# Patient Record
Sex: FEMALE | Race: WHITE | NOT HISPANIC OR LATINO | Employment: OTHER | URBAN - METROPOLITAN AREA
[De-identification: names, ages, dates, MRNs, and addresses within clinical notes are randomized per-mention and may not be internally consistent; named-entity substitution may affect disease eponyms.]

---

## 2017-02-10 ENCOUNTER — HOSPITAL ENCOUNTER (OUTPATIENT)
Dept: LAB | Facility: MEDICAL CENTER | Age: 62
End: 2017-02-10
Attending: FAMILY MEDICINE
Payer: MEDICARE

## 2017-02-10 LAB
ALBUMIN SERPL BCP-MCNC: 4 G/DL (ref 3.2–4.9)
ALBUMIN/GLOB SERPL: 1.1 G/DL
ALP SERPL-CCNC: 74 U/L (ref 30–99)
ALT SERPL-CCNC: 25 U/L (ref 2–50)
ANION GAP SERPL CALC-SCNC: 7 MMOL/L (ref 0–11.9)
AST SERPL-CCNC: 22 U/L (ref 12–45)
BASOPHILS # BLD AUTO: 0.04 K/UL (ref 0–0.12)
BASOPHILS NFR BLD AUTO: 0.5 % (ref 0–1.8)
BILIRUB SERPL-MCNC: 0.4 MG/DL (ref 0.1–1.5)
BUN SERPL-MCNC: 10 MG/DL (ref 8–22)
CALCIUM SERPL-MCNC: 9.3 MG/DL (ref 8.5–10.5)
CHLORIDE SERPL-SCNC: 106 MMOL/L (ref 96–112)
CHOLEST SERPL-MCNC: 178 MG/DL (ref 100–199)
CO2 SERPL-SCNC: 27 MMOL/L (ref 20–33)
CREAT SERPL-MCNC: 0.83 MG/DL (ref 0.5–1.4)
EOSINOPHIL # BLD: 0.31 K/UL (ref 0–0.51)
EOSINOPHIL NFR BLD AUTO: 3.6 % (ref 0–6.9)
ERYTHROCYTE [DISTWIDTH] IN BLOOD BY AUTOMATED COUNT: 49.6 FL (ref 35.9–50)
EST. AVERAGE GLUCOSE BLD GHB EST-MCNC: 131 MG/DL
GLOBULIN SER CALC-MCNC: 3.6 G/DL (ref 1.9–3.5)
GLUCOSE SERPL-MCNC: 123 MG/DL (ref 65–99)
HBA1C MFR BLD: 6.2 % (ref 0–5.6)
HCT VFR BLD AUTO: 47.7 % (ref 37–47)
HDLC SERPL-MCNC: 36 MG/DL
HGB BLD-MCNC: 15.3 G/DL (ref 12–16)
IMM GRANULOCYTES # BLD AUTO: 0.03 K/UL (ref 0–0.11)
IMM GRANULOCYTES NFR BLD AUTO: 0.3 % (ref 0–0.9)
LDLC SERPL CALC-MCNC: 109 MG/DL
LYMPHOCYTES # BLD: 3.12 K/UL (ref 1–4.8)
LYMPHOCYTES NFR BLD AUTO: 36.4 % (ref 22–41)
MCH RBC QN AUTO: 31 PG (ref 27–33)
MCHC RBC AUTO-ENTMCNC: 32.1 G/DL (ref 33.6–35)
MCV RBC AUTO: 96.8 FL (ref 81.4–97.8)
MONOCYTES # BLD: 0.38 K/UL (ref 0–0.85)
MONOCYTES NFR BLD AUTO: 4.4 % (ref 0–13.4)
NEUTROPHILS # BLD: 4.7 K/UL (ref 2–7.15)
NEUTROPHILS NFR BLD AUTO: 54.8 % (ref 44–72)
NRBC # BLD AUTO: 0 K/UL
NRBC BLD-RTO: 0 /100 WBC
PLATELET # BLD AUTO: 360 K/UL (ref 164–446)
PMV BLD AUTO: 11.3 FL (ref 9–12.9)
POTASSIUM SERPL-SCNC: 4.6 MMOL/L (ref 3.6–5.5)
PROT SERPL-MCNC: 7.6 G/DL (ref 6–8.2)
RBC # BLD AUTO: 4.93 M/UL (ref 4.2–5.4)
SODIUM SERPL-SCNC: 140 MMOL/L (ref 135–145)
T3 SERPL-MCNC: 116.2 NG/DL (ref 60–181)
T4 FREE SERPL-MCNC: 1.18 NG/DL (ref 0.53–1.43)
TRIGL SERPL-MCNC: 166 MG/DL (ref 0–149)
TSH SERPL DL<=0.005 MIU/L-ACNC: 1.48 UIU/ML (ref 0.3–3.7)
WBC # BLD AUTO: 8.6 K/UL (ref 4.8–10.8)

## 2017-02-10 PROCEDURE — 84443 ASSAY THYROID STIM HORMONE: CPT

## 2017-02-10 PROCEDURE — 85025 COMPLETE CBC W/AUTO DIFF WBC: CPT

## 2017-02-10 PROCEDURE — 83036 HEMOGLOBIN GLYCOSYLATED A1C: CPT | Mod: GA

## 2017-02-10 PROCEDURE — 80061 LIPID PANEL: CPT

## 2017-02-10 PROCEDURE — 36415 COLL VENOUS BLD VENIPUNCTURE: CPT | Mod: GA

## 2017-02-10 PROCEDURE — 80053 COMPREHEN METABOLIC PANEL: CPT

## 2017-02-10 PROCEDURE — 84439 ASSAY OF FREE THYROXINE: CPT

## 2017-02-10 PROCEDURE — 84480 ASSAY TRIIODOTHYRONINE (T3): CPT

## 2017-11-27 ENCOUNTER — OFFICE VISIT (OUTPATIENT)
Dept: BEHAVIORAL HEALTH | Facility: PHYSICIAN GROUP | Age: 62
End: 2017-11-27
Payer: MEDICARE

## 2017-11-27 DIAGNOSIS — F90.0 ATTENTION DEFICIT HYPERACTIVITY DISORDER (ADHD), PREDOMINANTLY INATTENTIVE TYPE: ICD-10-CM

## 2017-11-27 DIAGNOSIS — F33.42 MDD (MAJOR DEPRESSIVE DISORDER), RECURRENT, IN FULL REMISSION (HCC): ICD-10-CM

## 2017-11-27 DIAGNOSIS — Z87.39 H/O FIBROMYALGIA: ICD-10-CM

## 2017-11-27 PROBLEM — F90.9 ADHD: Status: ACTIVE | Noted: 2017-11-27

## 2017-11-27 PROCEDURE — 99204 OFFICE O/P NEW MOD 45 MIN: CPT | Performed by: STUDENT IN AN ORGANIZED HEALTH CARE EDUCATION/TRAINING PROGRAM

## 2017-11-27 RX ORDER — TOPIRAMATE 25 MG/1
TABLET ORAL
Qty: 60 TAB | Refills: 4 | Status: SHIPPED | OUTPATIENT
Start: 2017-11-27 | End: 2018-04-27 | Stop reason: SDUPTHER

## 2017-11-27 RX ORDER — DEXTROAMPHETAMINE SACCHARATE, AMPHETAMINE ASPARTATE, DEXTROAMPHETAMINE SULFATE AND AMPHETAMINE SULFATE 5; 5; 5; 5 MG/1; MG/1; MG/1; MG/1
20 TABLET ORAL 2 TIMES DAILY
Qty: 60 TAB | Refills: 0 | Status: SHIPPED | OUTPATIENT
Start: 2017-12-14 | End: 2017-11-27 | Stop reason: SDUPTHER

## 2017-11-27 RX ORDER — TOPIRAMATE 25 MG/1
TABLET ORAL
Qty: 60 TAB | Refills: 4 | Status: SHIPPED | OUTPATIENT
Start: 2017-11-27 | End: 2017-11-27 | Stop reason: SDUPTHER

## 2017-11-27 RX ORDER — ARIPIPRAZOLE 10 MG/1
10 TABLET ORAL DAILY
Qty: 30 TAB | Refills: 1 | Status: SHIPPED | OUTPATIENT
Start: 2017-11-27 | End: 2018-04-27 | Stop reason: SDUPTHER

## 2017-11-27 RX ORDER — DEXTROAMPHETAMINE SACCHARATE, AMPHETAMINE ASPARTATE, DEXTROAMPHETAMINE SULFATE AND AMPHETAMINE SULFATE 5; 5; 5; 5 MG/1; MG/1; MG/1; MG/1
20 TABLET ORAL 2 TIMES DAILY
Qty: 60 TAB | Refills: 0 | Status: SHIPPED | OUTPATIENT
Start: 2018-02-08 | End: 2018-01-08 | Stop reason: SDUPTHER

## 2017-11-27 RX ORDER — VENLAFAXINE HYDROCHLORIDE 150 MG/1
150 CAPSULE, EXTENDED RELEASE ORAL DAILY
Qty: 30 CAP | Refills: 3 | Status: SHIPPED | OUTPATIENT
Start: 2017-11-27 | End: 2018-04-27 | Stop reason: SDUPTHER

## 2017-11-27 RX ORDER — DEXTROAMPHETAMINE SACCHARATE, AMPHETAMINE ASPARTATE, DEXTROAMPHETAMINE SULFATE AND AMPHETAMINE SULFATE 5; 5; 5; 5 MG/1; MG/1; MG/1; MG/1
20 TABLET ORAL 2 TIMES DAILY
Qty: 60 TAB | Refills: 0 | Status: SHIPPED | OUTPATIENT
Start: 2018-01-11 | End: 2017-11-27 | Stop reason: SDUPTHER

## 2017-11-27 NOTE — PROGRESS NOTES
PSYCHIATRIC Evaluation::   Requesting Physician: Dr. Burdick (PCP)  Supervising Physician:     Dr. Alena John     ID:  63 y/o white female on disability secondary to depression/fibromyalgia, seen for new establishment visit this morning.     Chief Complaint: Doing well, needs new establishment with psychiatry and med refills today    CURRENT PSYCHOTROPIC MEDS:   abilify 20mg PO QDaily for depression/OCD (adjunct) - since 2010  Adderall 30mg PO BID for ADHD - since 2000  Effexor ER 150mg PO QDaily for depression - since 2000    HPI:   Says her new PCP suggested that patient become established with psychiatry because she did not feel comfortable prescribing her current psychotropic medications. Patient states she feels very stable on her current medication regiment (listed above) since 2010 for depression/fibromyalgia after abilify was added. Says this medication has significantly helped with her chronic suicidal ideation. Says her current mood is great, only complaint today is significant weight gain of 60 pounds over the last 7 years which she is attributing to Abilify. Discussed tapering Abilify to 10mg Daily as maximum efficacy of this medication has not shown significant difference between 10mg and 20 mg and additionally trialing Topiramate in the treatment and prevention of antipsychotic weight gain (off-label use). Otherwise states sleep is not effected.     Discussed taking adderall for ADHD, says she is taking this medication primarily for organization and focus- says this medication changed her life because her home use to be in significant clutter until this medication was started. Says she is able to take care of her mental health much better since starting this medication.        Psychiatric Review of Systems:current symptoms as reported by pt.  Depression:   None currently. Long hx of depression currently managed with above medication regiment.   Meseret: denies hx of meseret  Anxiety/Panic Attacks:  denies hx of anxiety/panic attacks  PTSD symptom: denies symptoms of ptsd  Psychosis: denies symptoms of psychosis          Medical Review of Systems: as reported by pt. All systems reviewed. Only those found to be + are noted below. All others are negative.   Neurological:    TBIs: denies   SZs: denies   Strokes: denies    Other medical symptoms:   Thyroid: hx of hypothyroidism managed with levothyroxine- does not remember current dosage   Diabetes: pre-diabetic HA1C 6.2.    Cardiovascular disease: hx of hyperlipidemia, currenly taking lipitor and fenofibrate   Hx of Asthma- currently smoking 1PPD    Psychiatric Examination: observed phenomenon:    Musculoskeletal(abnormal movements, gait, etc): no abnormal movements observed  Appearance: obese female, approprietly groomed. Answers questions appropriately.  Thoughts: linear, organized  Speech:RRR  Mood:          good  Affect:         Mood congruent, euthymic  SI/HI:   Denies/denies  Attention/Alertness:   alert  Memory:    Grossly intact  Orientation:    To person, place, time, and situation intact  Fund of Knowledge:    Grossly intact  Insight/Judgement into symptoms: good/good        Past Psychiatric Hx:   Denies previous hx of self-injurious behaviors  Denies previous hx of inpatient psychiatric hospitalizations   Denies hx of Suicidal attempt    PAST PSYCHOTROPIC MEDS:  -prozac/zoloft/paxil - unknown dosages.     Family Psychiatric Hx:  Mother with hx of alcoholism  sister 67y/o with history of parkinsons  Younger sister 59 y/o with hx of depression/anxiety     Social Hx:  Lives by herself,  X 1. No children  Currently on Disability since 2005 for depression/fibromyalgia  Says she is very close with her 4 siblings (3 sisters and 1 brother) who live in [place], [place].    Drug/Alcohol/Tobacco Hx:   Drugs: hx of daily marijuana use - 1X day for fibromyalgia   Alcohol: denies drinking at this time.   Tobacco: currently smoking 1ppd    Medical Hx: labs, MARS,  medications, etc were reviewed. Only those findings of potential interest to psychiatry are noted below:  Medical Conditions:   Hypothyroidism; pre-diabetes; hyperlipidemia   Allergies: nkda  Medications (currently prescribed at [facility]):   No current outpatient prescriptions on file prior to visit.     No current facility-administered medications on file prior to visit.      CURRENT MEDS: singular; lipitor ; albuterol; fenofibrate  Labs: 2/10/17: CBC/CMP wnl except glucose 123 (H). HA1C 6.2 (H). Triglycerides 166 (H);  (H); TSH WNL; Vit B12 WNL; Vit D WNL.   ECG: none on file     Cranial Imaging: n/a       ASSESSMENT/PLAN:  61 y/o white female with long hx of depression/fibromyalgia and ADHD. Appears to be well managed with her current medication regiment for her mood. Discussed tapering Abilify to 10mg Daily due to metabolic side effect of significant weight gain with addition of topiramate - off label use for weight gain associated with neuroleptic medications. Discussed trial of lower dose adderall as 40mg/daily is what is recommended as upper limit of maximum, patient is willing to trial today. Discussed healthy eating habits today.    #MDD, in sustained remission. With Fibromyalgia   #ADHD  -Hx of OCD symptoms    -Cont. Venlafaxine ER 150mg PO QDaily   -Taper adderall to 20mg PO BID  -Taper Abilify to 10mg PO QDaily  -Trial topiramate 25mg PO QDaily X 7 days, then increase to 50mg PO QDaily for weight gain associated with neuroleptic medication.  -f/u 10 weeks.

## 2017-12-01 ENCOUNTER — TELEPHONE (OUTPATIENT)
Dept: BEHAVIORAL HEALTH | Facility: PHYSICIAN GROUP | Age: 62
End: 2017-12-01

## 2017-12-01 NOTE — TELEPHONE ENCOUNTER
----- Message from Lilli Mata, Med Ass't sent at 11/27/2017 10:29 AM PST -----  Regarding: clarification  Pharmacy called needs clarification on the Topamax, states how you have it written doesn't make sense.     Please call [phone]      Reordered med with correct information. Left message on patients cell phone discussing dosages.

## 2017-12-18 ENCOUNTER — TELEPHONE (OUTPATIENT)
Dept: BEHAVIORAL HEALTH | Facility: PHYSICIAN GROUP | Age: 62
End: 2017-12-18

## 2017-12-18 NOTE — TELEPHONE ENCOUNTER
Returned patients phone call. Binh wrote for IR instead of XR for adderall, however, patient already picked up this months prescription. Requested to contact me 1.5 weeks before next fill and I will write for XR and have patient  prescription at our office with photo ID. Patient agreed to this plan.

## 2018-01-01 ENCOUNTER — OUTPATIENT (OUTPATIENT)
Dept: OUTPATIENT SERVICES | Facility: HOSPITAL | Age: 63
LOS: 1 days | End: 2018-01-01

## 2018-01-08 DIAGNOSIS — F90.0 ATTENTION DEFICIT HYPERACTIVITY DISORDER (ADHD), PREDOMINANTLY INATTENTIVE TYPE: Primary | ICD-10-CM

## 2018-01-08 RX ORDER — DEXTROAMPHETAMINE SACCHARATE, AMPHETAMINE ASPARTATE, DEXTROAMPHETAMINE SULFATE AND AMPHETAMINE SULFATE 5; 5; 5; 5 MG/1; MG/1; MG/1; MG/1
20 TABLET ORAL 2 TIMES DAILY
Qty: 60 TAB | Refills: 0 | Status: SHIPPED | OUTPATIENT
Start: 2018-01-08 | End: 2018-01-19

## 2018-01-08 RX ORDER — DEXTROAMPHETAMINE SACCHARATE, AMPHETAMINE ASPARTATE, DEXTROAMPHETAMINE SULFATE AND AMPHETAMINE SULFATE 5; 5; 5; 5 MG/1; MG/1; MG/1; MG/1
20 TABLET ORAL 2 TIMES DAILY
Qty: 60 TAB | Refills: 0 | Status: SHIPPED | OUTPATIENT
Start: 2018-02-02 | End: 2018-01-19

## 2018-01-08 RX ORDER — DEXTROAMPHETAMINE SACCHARATE, AMPHETAMINE ASPARTATE, DEXTROAMPHETAMINE SULFATE AND AMPHETAMINE SULFATE 5; 5; 5; 5 MG/1; MG/1; MG/1; MG/1
20 TABLET ORAL 2 TIMES DAILY
Qty: 60 TAB | Refills: 0 | Status: SHIPPED | OUTPATIENT
Start: 2018-02-08 | End: 2018-01-08 | Stop reason: SDUPTHER

## 2018-01-19 ENCOUNTER — EMERGENCY (EMERGENCY)
Facility: HOSPITAL | Age: 63
LOS: 0 days | Discharge: HOME | End: 2018-01-19
Attending: EMERGENCY MEDICINE | Admitting: EMERGENCY MEDICINE
Payer: MEDICAID

## 2018-01-19 VITALS
SYSTOLIC BLOOD PRESSURE: 142 MMHG | RESPIRATION RATE: 16 BRPM | DIASTOLIC BLOOD PRESSURE: 91 MMHG | TEMPERATURE: 98 F | OXYGEN SATURATION: 100 % | HEART RATE: 86 BPM

## 2018-01-19 VITALS — WEIGHT: 199.96 LBS

## 2018-01-19 DIAGNOSIS — M25.561 PAIN IN RIGHT KNEE: ICD-10-CM

## 2018-01-19 DIAGNOSIS — F90.0 ATTENTION DEFICIT HYPERACTIVITY DISORDER (ADHD), PREDOMINANTLY INATTENTIVE TYPE: Primary | ICD-10-CM

## 2018-01-19 PROCEDURE — 99284 EMERGENCY DEPT VISIT MOD MDM: CPT

## 2018-01-19 RX ORDER — DEXTROAMPHETAMINE SACCHARATE, AMPHETAMINE ASPARTATE MONOHYDRATE, DEXTROAMPHETAMINE SULFATE AND AMPHETAMINE SULFATE 7.5; 7.5; 7.5; 7.5 MG/1; MG/1; MG/1; MG/1
30 CAPSULE, EXTENDED RELEASE ORAL EVERY MORNING
Qty: 30 CAP | Refills: 0 | Status: SHIPPED | OUTPATIENT
Start: 2018-01-19 | End: 2018-02-09

## 2018-01-19 RX ORDER — DEXTROAMPHETAMINE SACCHARATE, AMPHETAMINE ASPARTATE MONOHYDRATE, DEXTROAMPHETAMINE SULFATE AND AMPHETAMINE SULFATE 7.5; 7.5; 7.5; 7.5 MG/1; MG/1; MG/1; MG/1
30 CAPSULE, EXTENDED RELEASE ORAL EVERY MORNING
Qty: 30 CAP | Refills: 0 | Status: SHIPPED | OUTPATIENT
Start: 2018-02-15 | End: 2018-02-09

## 2018-01-19 NOTE — ED STATDOCS - PROGRESS NOTE DETAILS
ZACH Melgoza:  Pt seen and examined, she presented with c/o right knee pain and swelling s/p injury 3 wks ago and reinjuring it again 2 days ago. Denies any LE weakness, long car rides, able to ambulate. Denies any redness. PE Right knee with ecchymosis to the shin consistent with the injury 3 wks ago, +edema. nontender to palpation, FROM, NVI. Plan: Xray negative for any fracture. Will dc home for outpt f/u.

## 2018-01-19 NOTE — ED STATDOCS - OBJECTIVE STATEMENT
62f pmh breast cancer, takes tamoxifen analog, presents sent in by PMD for right knee pain r/o DVT.  Pt struck her right knee against her tub 3 weeks ago, developed a hematoma which slowly began traveling down her leg to the ankle.  2 days ago she reinjured the leg while walking her dog, now concerned for DVT.

## 2018-01-19 NOTE — ED STATDOCS - MUSCULOSKELETAL, MLM
Ecchymosis to anterior right lower leg with tracking edema to lateral right foot consistent with dependent edema. No erythema. Ambulatory. NVI.

## 2018-01-19 NOTE — TELEPHONE ENCOUNTER
Returned phone call, patient requesting extended release adderall, agreed to write for 30mg ER (generic). Patient will  in office, need to bring ID. F/U appontment scheduled for Feb 2018.

## 2018-01-19 NOTE — ED STATDOCS - MEDICAL DECISION MAKING DETAILS
History and physical consistent with traumatic leg injury and dependent ecchymosis. No suspicion for DVT, will xray r/o fracture and DC home with pain control.

## 2018-01-19 NOTE — ED ADULT TRIAGE NOTE - CHIEF COMPLAINT QUOTE
Pt states that 3 weejs ago she fell onto her right knee and had bruising from the knee down, with swelling, pt states that she fell again tuesday and has inc. bruising swelling and pain and was advised to go to ed by MD pt denies being on anticoagulation therapy. normal sensation and able to ambulate

## 2018-01-20 PROCEDURE — 73562 X-RAY EXAM OF KNEE 3: CPT | Mod: 26,RT

## 2018-01-23 DIAGNOSIS — R69 ILLNESS, UNSPECIFIED: ICD-10-CM

## 2018-02-06 DIAGNOSIS — F90.0 ATTENTION DEFICIT HYPERACTIVITY DISORDER (ADHD), PREDOMINANTLY INATTENTIVE TYPE: ICD-10-CM

## 2018-02-09 DIAGNOSIS — F90.0 ATTENTION DEFICIT HYPERACTIVITY DISORDER (ADHD), PREDOMINANTLY INATTENTIVE TYPE: ICD-10-CM

## 2018-02-09 RX ORDER — DEXTROAMPHETAMINE SACCHARATE, AMPHETAMINE ASPARTATE, DEXTROAMPHETAMINE SULFATE AND AMPHETAMINE SULFATE 5; 5; 5; 5 MG/1; MG/1; MG/1; MG/1
20 TABLET ORAL 2 TIMES DAILY
Qty: 60 TAB | Refills: 0 | Status: SHIPPED | OUTPATIENT
Start: 2018-02-09 | End: 2018-02-23 | Stop reason: SDUPTHER

## 2018-02-09 RX ORDER — DEXTROAMPHETAMINE SACCHARATE, AMPHETAMINE ASPARTATE MONOHYDRATE, DEXTROAMPHETAMINE SULFATE AND AMPHETAMINE SULFATE 7.5; 7.5; 7.5; 7.5 MG/1; MG/1; MG/1; MG/1
30 CAPSULE, EXTENDED RELEASE ORAL EVERY MORNING
Qty: 30 CAP | Refills: 0 | OUTPATIENT
Start: 2018-02-09 | End: 2018-03-11

## 2018-02-09 NOTE — TELEPHONE ENCOUNTER
Patient would like to go back to IR rather then XR for adderall. Completed request today, available for  at our clinic. F/U appointment in 2 weeks.

## 2018-02-23 ENCOUNTER — OFFICE VISIT (OUTPATIENT)
Dept: BEHAVIORAL HEALTH | Facility: PHYSICIAN GROUP | Age: 63
End: 2018-02-23
Payer: MEDICARE

## 2018-02-23 DIAGNOSIS — F90.0 ATTENTION DEFICIT HYPERACTIVITY DISORDER (ADHD), PREDOMINANTLY INATTENTIVE TYPE: ICD-10-CM

## 2018-02-23 DIAGNOSIS — F33.42 MDD (MAJOR DEPRESSIVE DISORDER), RECURRENT, IN FULL REMISSION (HCC): ICD-10-CM

## 2018-02-23 DIAGNOSIS — Z87.39 H/O FIBROMYALGIA: ICD-10-CM

## 2018-02-23 PROCEDURE — 99214 OFFICE O/P EST MOD 30 MIN: CPT | Performed by: STUDENT IN AN ORGANIZED HEALTH CARE EDUCATION/TRAINING PROGRAM

## 2018-02-23 RX ORDER — DEXTROAMPHETAMINE SACCHARATE, AMPHETAMINE ASPARTATE, DEXTROAMPHETAMINE SULFATE AND AMPHETAMINE SULFATE 5; 5; 5; 5 MG/1; MG/1; MG/1; MG/1
20 TABLET ORAL 2 TIMES DAILY
Qty: 60 TAB | Refills: 0 | Status: SHIPPED | OUTPATIENT
Start: 2018-02-23 | End: 2018-03-25

## 2018-02-23 RX ORDER — DEXTROAMPHETAMINE SACCHARATE, AMPHETAMINE ASPARTATE, DEXTROAMPHETAMINE SULFATE AND AMPHETAMINE SULFATE 5; 5; 5; 5 MG/1; MG/1; MG/1; MG/1
20 TABLET ORAL 2 TIMES DAILY
Qty: 60 TAB | Refills: 0 | Status: SHIPPED | OUTPATIENT
Start: 2018-04-23 | End: 2018-04-27 | Stop reason: SDUPTHER

## 2018-02-23 RX ORDER — DEXTROAMPHETAMINE SACCHARATE, AMPHETAMINE ASPARTATE, DEXTROAMPHETAMINE SULFATE AND AMPHETAMINE SULFATE 5; 5; 5; 5 MG/1; MG/1; MG/1; MG/1
20 TABLET ORAL 2 TIMES DAILY
Qty: 60 TAB | Refills: 0 | Status: SHIPPED | OUTPATIENT
Start: 2018-03-23 | End: 2018-04-22

## 2018-02-23 RX ORDER — BUPROPION HYDROCHLORIDE 75 MG/1
75 TABLET ORAL EVERY MORNING
Qty: 30 TAB | Refills: 2 | Status: SHIPPED | OUTPATIENT
Start: 2018-02-23 | End: 2018-04-27

## 2018-02-23 NOTE — PROGRESS NOTES
[facility]  PSYCHIATRIC FOLLOW-UP NOTE    Name: Alesia Cesar  MRN: [id number]  : 1955  Age: 62 y.o.  Date of assessment: 2018  PCP: Ludmila Rodriguez M.D.  Persons in attendance: patient  Total face-to-face time: 30  minutes    REASON FOR VISIT/CHIEF COMPLAINT (as stated by patient)  Alesia Cesar is a:  61 y/o white female on disability secondary to depression/fibromyalgia, with hx of adhd, previously seen by this writer on 17    CURRENT PSYCHOTROPIC MEDS:  - Venlafaxine ER 150mg PO QDaily   -adderall to 20mg PO BID  - Abilify to 10mg PO QDaily  -topiramate 50mg PO Qdaily      HISTORY OF PRESENT ILLNESS:    Says that since her last appointment she has been doing well on her current meds, says she has not had any significant weight gain which she attributes to her new medication topiramate. 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 Requested for higher dose of adderall as she feels that it does not have the same effect as when she was on 60mg previously- I explained that it is not recommended at higher doses for ADHD, and that I can suggest augmentation with wellbutrin due to its stimulant like effects which she agreed to today.     PSYCHOSOCIAL CHANGES SINCE PREVIOUS CONTACT:  None reported    RESPONSE TO TREATMENT:  Responsive for ADHD and depression    MEDICATION SIDE EFFECTS:  None reported    REVIEW OF SYSTEMS:        Constitutional negative   Eyes negative   Ears/Nose/Mouth/Throat negative   Cardiovascular negative   Respiratory negative    Gastrointestinal negative   Genitourinary negative   Muscular negative   Integumentary negative   Neurological negative   Endocrine negative   Hematologic/Lymphatic negative       PSYCHIATRIC EXAMINATION/MENTAL STATUS  There were no vitals taken for this visit.  Participation: Active verbal participation  Grooming:Good  Orientation:  Alert  Eye contact: Good  Behavior:Calm   Mood: Euthymic  Affect: Flexible  Thought process: Logical  Thought content:  Within normal limits  Speech: Rate within normal limits  Perception:  Within normal limits  Memory:  No gross evidence of memory deficits  Insight: Good  Judgment: Good    Current risk:    Suicide: Low   Homicide: Low   Self-harm: Low  Relapse: Low  Other:   Crisis Safety Plan reviewed?Yes  If evidence of imminent risk is present, intervention/plan:    CURRENT MEDS: singular; lipitor ; albuterol; fenofibrate  Labs: 2/10/17: CBC/CMP wnl except glucose 123 (H). HA1C 6.2 (H). Triglycerides 166 (H);  (H); TSH WNL; Vit B12 WNL; Vit D WNL.   ECG: none on file         ASSESSMENT AND PLAN:  61 y/o white female with long hx of depression/fibromyalgia and ADHD. Appears to be well managed with her current medication regiment for her mood. Discussed tapering Abilify to 10mg Daily due to metabolic side effect of significant weight gain with addition of topiramate - off label use for weight gain associated with neuroleptic medications. Discussed trial of lower dose adderall as 40mg/daily is what is recommended as upper limit of maximum, patient is willing to trial today. Discussed healthy eating habits today.     #MDD, in sustained remission. With Fibromyalgia   #ADHD  -Hx of OCD symptoms     -Cont. Venlafaxine ER 150mg PO QDaily   -cont. adderall to 20mg PO BID  -cont. Abilify to 10mg PO QDaily  -cont. topiramate  50mg PO QDaily for weight gain associated with neuroleptic medication.  -trial wellbutrin 75mg PO QAM for adhd augmentation   -discussed weight loss, healthy eating habits, and reducing her current cigarette use ( currently 1ppd)  -f/u 10 weeks.       Hi Munoz M.D.

## 2018-03-17 ENCOUNTER — HOSPITAL ENCOUNTER (OUTPATIENT)
Dept: LAB | Facility: MEDICAL CENTER | Age: 63
End: 2018-03-17
Attending: FAMILY MEDICINE
Payer: MEDICARE

## 2018-03-17 LAB
ALBUMIN SERPL BCP-MCNC: 4.3 G/DL (ref 3.2–4.9)
ALBUMIN/GLOB SERPL: 1.2 G/DL
ALP SERPL-CCNC: 76 U/L (ref 30–99)
ALT SERPL-CCNC: 32 U/L (ref 2–50)
ANION GAP SERPL CALC-SCNC: 12 MMOL/L (ref 0–11.9)
AST SERPL-CCNC: 24 U/L (ref 12–45)
BASOPHILS # BLD AUTO: 0.5 % (ref 0–1.8)
BASOPHILS # BLD: 0.05 K/UL (ref 0–0.12)
BILIRUB SERPL-MCNC: 0.5 MG/DL (ref 0.1–1.5)
BUN SERPL-MCNC: 15 MG/DL (ref 8–22)
CALCIUM SERPL-MCNC: 10.1 MG/DL (ref 8.5–10.5)
CHLORIDE SERPL-SCNC: 110 MMOL/L (ref 96–112)
CHOLEST SERPL-MCNC: 227 MG/DL (ref 100–199)
CO2 SERPL-SCNC: 18 MMOL/L (ref 20–33)
CREAT SERPL-MCNC: 1.05 MG/DL (ref 0.5–1.4)
EOSINOPHIL # BLD AUTO: 0.37 K/UL (ref 0–0.51)
EOSINOPHIL NFR BLD: 3.4 % (ref 0–6.9)
ERYTHROCYTE [DISTWIDTH] IN BLOOD BY AUTOMATED COUNT: 52 FL (ref 35.9–50)
EST. AVERAGE GLUCOSE BLD GHB EST-MCNC: 148 MG/DL
GLOBULIN SER CALC-MCNC: 3.7 G/DL (ref 1.9–3.5)
GLUCOSE SERPL-MCNC: 177 MG/DL (ref 65–99)
HBA1C MFR BLD: 6.8 % (ref 0–5.6)
HCT VFR BLD AUTO: 52.6 % (ref 37–47)
HDLC SERPL-MCNC: 39 MG/DL
HGB BLD-MCNC: 16.7 G/DL (ref 12–16)
IMM GRANULOCYTES # BLD AUTO: 0.06 K/UL (ref 0–0.11)
IMM GRANULOCYTES NFR BLD AUTO: 0.6 % (ref 0–0.9)
LDLC SERPL CALC-MCNC: 157 MG/DL
LYMPHOCYTES # BLD AUTO: 3.72 K/UL (ref 1–4.8)
LYMPHOCYTES NFR BLD: 34.6 % (ref 22–41)
MCH RBC QN AUTO: 31.1 PG (ref 27–33)
MCHC RBC AUTO-ENTMCNC: 31.7 G/DL (ref 33.6–35)
MCV RBC AUTO: 98 FL (ref 81.4–97.8)
MONOCYTES # BLD AUTO: 0.45 K/UL (ref 0–0.85)
MONOCYTES NFR BLD AUTO: 4.2 % (ref 0–13.4)
NEUTROPHILS # BLD AUTO: 6.11 K/UL (ref 2–7.15)
NEUTROPHILS NFR BLD: 56.7 % (ref 44–72)
NRBC # BLD AUTO: 0 K/UL
NRBC BLD-RTO: 0 /100 WBC
PLATELET # BLD AUTO: 426 K/UL (ref 164–446)
PMV BLD AUTO: 12.1 FL (ref 9–12.9)
POTASSIUM SERPL-SCNC: 4.2 MMOL/L (ref 3.6–5.5)
PROT SERPL-MCNC: 8 G/DL (ref 6–8.2)
RBC # BLD AUTO: 5.37 M/UL (ref 4.2–5.4)
SODIUM SERPL-SCNC: 140 MMOL/L (ref 135–145)
TRIGL SERPL-MCNC: 155 MG/DL (ref 0–149)
TSH SERPL DL<=0.005 MIU/L-ACNC: 5.77 UIU/ML (ref 0.38–5.33)
WBC # BLD AUTO: 10.8 K/UL (ref 4.8–10.8)

## 2018-03-17 PROCEDURE — 80061 LIPID PANEL: CPT

## 2018-03-17 PROCEDURE — 83036 HEMOGLOBIN GLYCOSYLATED A1C: CPT | Mod: GA

## 2018-03-17 PROCEDURE — 84443 ASSAY THYROID STIM HORMONE: CPT

## 2018-03-17 PROCEDURE — 36415 COLL VENOUS BLD VENIPUNCTURE: CPT | Mod: GA

## 2018-03-17 PROCEDURE — 80053 COMPREHEN METABOLIC PANEL: CPT

## 2018-03-17 PROCEDURE — 85025 COMPLETE CBC W/AUTO DIFF WBC: CPT

## 2018-03-20 ENCOUNTER — HOSPITAL ENCOUNTER (OUTPATIENT)
Facility: MEDICAL CENTER | Age: 63
End: 2018-03-20
Attending: FAMILY MEDICINE
Payer: MEDICARE

## 2018-03-20 LAB
CREAT UR-MCNC: 192.8 MG/DL
MICROALBUMIN UR-MCNC: 2 MG/DL
MICROALBUMIN/CREAT UR: 10 MG/G (ref 0–30)

## 2018-03-20 PROCEDURE — 82043 UR ALBUMIN QUANTITATIVE: CPT

## 2018-03-20 PROCEDURE — 82570 ASSAY OF URINE CREATININE: CPT

## 2018-04-27 ENCOUNTER — OFFICE VISIT (OUTPATIENT)
Dept: BEHAVIORAL HEALTH | Facility: PHYSICIAN GROUP | Age: 63
End: 2018-04-27
Payer: MEDICARE

## 2018-04-27 DIAGNOSIS — F90.0 ATTENTION DEFICIT HYPERACTIVITY DISORDER (ADHD), PREDOMINANTLY INATTENTIVE TYPE: Primary | ICD-10-CM

## 2018-04-27 PROCEDURE — 99214 OFFICE O/P EST MOD 30 MIN: CPT | Performed by: STUDENT IN AN ORGANIZED HEALTH CARE EDUCATION/TRAINING PROGRAM

## 2018-04-27 RX ORDER — TOPIRAMATE 25 MG/1
50 TABLET ORAL DAILY
Qty: 60 TAB | Refills: 4 | Status: SHIPPED | OUTPATIENT
Start: 2018-04-27 | End: 2018-06-19

## 2018-04-27 RX ORDER — ARIPIPRAZOLE 15 MG/1
15 TABLET ORAL DAILY
Qty: 30 TAB | Refills: 2 | Status: SHIPPED | OUTPATIENT
Start: 2018-04-27 | End: 2018-06-18 | Stop reason: SDUPTHER

## 2018-04-27 RX ORDER — DEXTROAMPHETAMINE SACCHARATE, AMPHETAMINE ASPARTATE, DEXTROAMPHETAMINE SULFATE AND AMPHETAMINE SULFATE 5; 5; 5; 5 MG/1; MG/1; MG/1; MG/1
20 TABLET ORAL 2 TIMES DAILY
Qty: 60 TAB | Refills: 0 | Status: SHIPPED | OUTPATIENT
Start: 2018-05-15 | End: 2018-06-14

## 2018-04-27 RX ORDER — DEXTROAMPHETAMINE SACCHARATE, AMPHETAMINE ASPARTATE, DEXTROAMPHETAMINE SULFATE AND AMPHETAMINE SULFATE 5; 5; 5; 5 MG/1; MG/1; MG/1; MG/1
20 TABLET ORAL 2 TIMES DAILY
Qty: 60 TAB | Refills: 0 | Status: SHIPPED | OUTPATIENT
Start: 2018-06-12 | End: 2018-06-18 | Stop reason: SDUPTHER

## 2018-04-27 RX ORDER — ARIPIPRAZOLE 15 MG/1
15 TABLET ORAL DAILY
Qty: 30 TAB | Refills: 1 | Status: SHIPPED | OUTPATIENT
Start: 2018-04-27 | End: 2018-04-27 | Stop reason: SDUPTHER

## 2018-04-27 RX ORDER — VENLAFAXINE HYDROCHLORIDE 150 MG/1
150 CAPSULE, EXTENDED RELEASE ORAL DAILY
Qty: 30 CAP | Refills: 3 | Status: SHIPPED | OUTPATIENT
Start: 2018-04-27 | End: 2018-08-21 | Stop reason: SDUPTHER

## 2018-04-27 NOTE — PROGRESS NOTES
[facility]  PSYCHIATRIC FOLLOW-UP NOTE    Name: Alesia Cesar  MRN: [id number]  : 1955  Age: 62 y.o.  Date of assessment: 18  PCP: Ludmila Rodriguez M.D.  Persons in attendance: patient  Total face-to-face time: 30  minutes    REASON FOR VISIT/CHIEF COMPLAINT (as stated by patient)  Alesia Cesar is a:  63 y/o white female on disability secondary to depression/fibromyalgia, with hx of adhd, previously seen by this writer on     CURRENT PSYCHOTROPIC MEDS:  - Venlafaxine ER 150mg PO QDaily   -adderall to 20mg PO BID  - Abilify to 10mg PO QDaily  -topiramate 50mg PO Qdaily      HISTORY OF PRESENT ILLNESS:    Says that since her last appointment she has noticed that she has repetitive intrusive thoughts since decreasing her abilify and requeing to titrate back up a little bit higher. Says her mood has remained stable and has no other problems or complaints today. Discussed having med-holiday 2x/week with her adderall to help maintain efficacy of the medication which she agrees to. Discussed cutting down smoking from 20 cig/day to a more reasonable amount of 5-10 daily which will help with her exercise endurance. Sleeping and eating without difficulty. Will adjust meds and f/u in 2018.    PSYCHOSOCIAL CHANGES SINCE PREVIOUS CONTACT:  None reported    RESPONSE TO TREATMENT:  Responsive for ADHD and depression    MEDICATION SIDE EFFECTS:  None reported    REVIEW OF SYSTEMS:        Constitutional negative   Eyes negative   Ears/Nose/Mouth/Throat negative   Cardiovascular negative   Respiratory negative    Gastrointestinal negative   Genitourinary negative   Muscular negative   Integumentary negative   Neurological negative   Endocrine negative   Hematologic/Lymphatic negative       PSYCHIATRIC EXAMINATION/MENTAL STATUS  There were no vitals taken for this visit.  Participation: Active verbal participation  Grooming:Good  Orientation: Alert  Eye contact: Good  Behavior:Calm   Mood:  Euthymic  Affect: Flexible  Thought process: Logical  Thought content:  Within normal limits  Speech: Rate within normal limits  Perception:  Within normal limits  Memory:  No gross evidence of memory deficits  Insight: Good  Judgment: Good    Current risk:    Suicide: Low   Homicide: Low   Self-harm: Low  Relapse: Low  Other:   Crisis Safety Plan reviewed?Yes  If evidence of imminent risk is present, intervention/plan:    CURRENT MEDS: singular; lipitor ; albuterol; fenofibrate  Labs: 2/10/17: CBC/CMP wnl except glucose 123 (H). HA1C 6.2 (H). Triglycerides 166 (H);  (H); TSH WNL; Vit B12 WNL; Vit D WNL.   ECG: none on file         ASSESSMENT AND PLAN:  61 y/o white female with long hx of depression/fibromyalgia and ADHD. Appears to be well managed with her current medication regiment for her mood. Discussed tapering Abilify to 10mg Daily due to metabolic side effect of significant weight gain with addition of topiramate - off label use for weight gain associated with neuroleptic medications. Discussed trial of lower dose adderall as 40mg/daily is what is recommended as upper limit of maximum, patient is willing to trial today. Discussed healthy eating habits today.     #MDD, in sustained remission. With Fibromyalgia   #ADHD  -Hx of OCD symptoms     -Cont. Venlafaxine ER 150mg PO QDaily   -cont. adderall to 20mg PO BID  -Titrate. Abilify from 10mg PO QDaily to 15mg PO Qdaily  -cont. topiramate  50mg PO QDaily for weight gain associated with neuroleptic medication.  -d/c wellbutrin  -discussed weight loss, healthy eating habits, and reducing her current cigarette use ( currently 1ppd)  -f/u 8 weeks.       Hi Munoz M.D.

## 2018-05-01 ENCOUNTER — OUTPATIENT (OUTPATIENT)
Dept: OUTPATIENT SERVICES | Facility: HOSPITAL | Age: 63
LOS: 1 days | End: 2018-05-01
Payer: MEDICAID

## 2018-05-01 PROCEDURE — G9001: CPT

## 2018-05-03 DIAGNOSIS — R69 ILLNESS, UNSPECIFIED: ICD-10-CM

## 2018-06-18 ENCOUNTER — OFFICE VISIT (OUTPATIENT)
Dept: BEHAVIORAL HEALTH | Facility: PHYSICIAN GROUP | Age: 63
End: 2018-06-18
Payer: MEDICARE

## 2018-06-18 DIAGNOSIS — F90.2 ATTENTION DEFICIT HYPERACTIVITY DISORDER (ADHD), COMBINED TYPE: Primary | ICD-10-CM

## 2018-06-18 DIAGNOSIS — Z87.39 H/O FIBROMYALGIA: ICD-10-CM

## 2018-06-18 DIAGNOSIS — F33.42 MDD (MAJOR DEPRESSIVE DISORDER), RECURRENT, IN FULL REMISSION (HCC): ICD-10-CM

## 2018-06-18 PROCEDURE — 99214 OFFICE O/P EST MOD 30 MIN: CPT | Performed by: STUDENT IN AN ORGANIZED HEALTH CARE EDUCATION/TRAINING PROGRAM

## 2018-06-18 RX ORDER — ARIPIPRAZOLE 20 MG/1
20 TABLET ORAL DAILY
Qty: 30 TAB | Refills: 3 | Status: SHIPPED | OUTPATIENT
Start: 2018-06-18 | End: 2018-08-21 | Stop reason: SDUPTHER

## 2018-06-18 RX ORDER — DEXTROAMPHETAMINE SACCHARATE, AMPHETAMINE ASPARTATE, DEXTROAMPHETAMINE SULFATE AND AMPHETAMINE SULFATE 3.75; 3.75; 3.75; 3.75 MG/1; MG/1; MG/1; MG/1
15 TABLET ORAL
Qty: 90 TAB | Refills: 0 | Status: SHIPPED | OUTPATIENT
Start: 2018-07-16 | End: 2018-08-15

## 2018-06-18 RX ORDER — DEXTROAMPHETAMINE SACCHARATE, AMPHETAMINE ASPARTATE, DEXTROAMPHETAMINE SULFATE AND AMPHETAMINE SULFATE 3.75; 3.75; 3.75; 3.75 MG/1; MG/1; MG/1; MG/1
15 TABLET ORAL
Qty: 90 TAB | Refills: 0 | Status: SHIPPED | OUTPATIENT
Start: 2018-08-13 | End: 2018-08-21 | Stop reason: SDUPTHER

## 2018-06-18 RX ORDER — DEXTROAMPHETAMINE SACCHARATE, AMPHETAMINE ASPARTATE, DEXTROAMPHETAMINE SULFATE AND AMPHETAMINE SULFATE 3.75; 3.75; 3.75; 3.75 MG/1; MG/1; MG/1; MG/1
15 TABLET ORAL
Qty: 90 TAB | Refills: 0 | Status: SHIPPED | OUTPATIENT
Start: 2018-06-18 | End: 2018-07-18

## 2018-06-18 NOTE — PROGRESS NOTES
[facility]  PSYCHIATRIC FOLLOW-UP NOTE    Name: Alesia Cesar  MRN: [id number]  : 1955  Age: 62 y.o.  Date of assessment: 18  PCP: Ludmila Rodriguez M.D.  Persons in attendance: patient  Total face-to-face time: 30  minutes    REASON FOR VISIT/CHIEF COMPLAINT (as stated by patient)  Alesia Cesar is a:  63 y/o white female on disability secondary to depression/fibromyalgia, with hx of adhd, previously seen by this writer on 18    CURRENT PSYCHOTROPIC MEDS:  - Venlafaxine ER 150mg PO QDaily   - Abilify to 15mg daily ( divided BID)  -Adderall IR 20mg PO BID ( total of 40mg daily)      HISTORY OF PRESENT ILLNESS:    Says since her last visit she has noticed she continues to have intrusive thoughts that have worsened since tapering her abilify from 20mg daily- would like to be on higher dose at this time which I have agreed to. Also states that her adderall that she takes twice a day last upwards of 4 hours for IR and therefore will provide in TID dosage which she agreed to. Otherwise says she feels like her mood is getting affected because of her intrusive thoughts, however, sleeping and eating without difficulty and has reduced her cigarette intake from 20cig/day to 15cig/day which she is happy about- willing to cut down again to 10cig/day. 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    PSYCHOSOCIAL CHANGES SINCE PREVIOUS CONTACT:  None reported    RESPONSE TO TREATMENT:  Responsive for ADHD and depression    MEDICATION SIDE EFFECTS:  None reported    REVIEW OF SYSTEMS:        Constitutional negative   Eyes negative   Ears/Nose/Mouth/Throat negative   Cardiovascular negative   Respiratory negative    Gastrointestinal negative   Genitourinary negative   Muscular negative   Integumentary negative   Neurological negative   Endocrine negative   Hematologic/Lymphatic negative        PSYCHIATRIC EXAMINATION/MENTAL STATUS  There were no vitals taken for this visit.  Participation: Active verbal participation  Grooming:Good  Orientation: Alert  Eye contact: Good  Behavior:Calm   Mood: okay today  Affect: Flexible  Thought process: Logical  Thought content:  Within normal limits  Speech: Rate within normal limits  Perception:  Within normal limits  Memory:  No gross evidence of memory deficits  Insight: Good  Judgment: Good    Current risk:    Suicide: Low   Homicide: Low   Self-harm: Low  Relapse: Low  Other:   Crisis Safety Plan reviewed?Yes  If evidence of imminent risk is present, intervention/plan:    CURRENT MEDS: singular; lipitor ; albuterol; fenofibrate  Labs: 2/10/17: CBC/CMP wnl except glucose 123 (H). HA1C 6.2 (H). Triglycerides 166 (H);  (H); TSH WNL; Vit B12 WNL; Vit D WNL.   ECG: none on file         ASSESSMENT AND PLAN:  61 y/o white female with long hx of depression/fibromyalgia and ADHD. Appears to be well managed with her current medication regiment for her mood prior to abilify taper, therefore will titrate back to 20mg daily . Discussed trial of lower dose adderall as 40mg/daily is what is recommended as upper limit of maximum, patient is willing to trial today. Discussed healthy eating habits today.     #MDD, in sustained remission. With Fibromyalgia   #ADHD  -Hx of OCD symptoms     -Cont. Venlafaxine ER 150mg PO QDaily   -cont. adderall to 15mg PO TID  -Titrate. Abilify from 15mg PO QDaily to 20mg PO Qdaily  -d/c topiramate  50mg PO QDaily- says it is not helping for weight ( stopped without notifying this provider)  -discussed weight loss, healthy eating habits, and reducing her current cigarette use ( currently 15 cig/day)  -f/u 12 weeks.       Hi Munoz M.D.

## 2018-08-15 DIAGNOSIS — F90.2 ATTENTION DEFICIT HYPERACTIVITY DISORDER (ADHD), COMBINED TYPE: ICD-10-CM

## 2018-08-15 RX ORDER — DEXTROAMPHETAMINE SACCHARATE, AMPHETAMINE ASPARTATE, DEXTROAMPHETAMINE SULFATE AND AMPHETAMINE SULFATE 3.75; 3.75; 3.75; 3.75 MG/1; MG/1; MG/1; MG/1
15 TABLET ORAL
Qty: 90 TAB | Refills: 0 | OUTPATIENT
Start: 2018-08-15 | End: 2018-09-14

## 2018-08-15 NOTE — TELEPHONE ENCOUNTER
Was the patient seen in the last year in this department? Yes    Does patient have an active prescription for medications requested? Yes    Received Request Via: Pharmacy       FYI:   Previous Dr. Munoz pt, pt has appointment on 8/21/18

## 2018-08-21 ENCOUNTER — OFFICE VISIT (OUTPATIENT)
Dept: BEHAVIORAL HEALTH | Facility: PHYSICIAN GROUP | Age: 63
End: 2018-08-21
Payer: MEDICARE

## 2018-08-21 VITALS
WEIGHT: 246 LBS | HEART RATE: 108 BPM | BODY MASS INDEX: 42 KG/M2 | HEIGHT: 64 IN | SYSTOLIC BLOOD PRESSURE: 146 MMHG | DIASTOLIC BLOOD PRESSURE: 90 MMHG

## 2018-08-21 DIAGNOSIS — F33.42 MDD (MAJOR DEPRESSIVE DISORDER), RECURRENT, IN FULL REMISSION (HCC): ICD-10-CM

## 2018-08-21 DIAGNOSIS — R53.82 CHRONIC FATIGUE: ICD-10-CM

## 2018-08-21 DIAGNOSIS — F90.0 ADHD (ATTENTION DEFICIT HYPERACTIVITY DISORDER), INATTENTIVE TYPE: ICD-10-CM

## 2018-08-21 DIAGNOSIS — E11.9 TYPE 2 DIABETES MELLITUS WITHOUT COMPLICATION, WITHOUT LONG-TERM CURRENT USE OF INSULIN (HCC): ICD-10-CM

## 2018-08-21 DIAGNOSIS — R03.0 ELEVATED BP WITHOUT DIAGNOSIS OF HYPERTENSION: ICD-10-CM

## 2018-08-21 PROCEDURE — 99214 OFFICE O/P EST MOD 30 MIN: CPT | Performed by: PSYCHIATRY & NEUROLOGY

## 2018-08-21 RX ORDER — FLUTICASONE PROPIONATE 50 MCG
SPRAY, SUSPENSION (ML) NASAL
COMMUNITY
Start: 2018-07-27 | End: 2020-09-28

## 2018-08-21 RX ORDER — DEXTROAMPHETAMINE SACCHARATE, AMPHETAMINE ASPARTATE, DEXTROAMPHETAMINE SULFATE AND AMPHETAMINE SULFATE 3.75; 3.75; 3.75; 3.75 MG/1; MG/1; MG/1; MG/1
15 TABLET ORAL 3 TIMES DAILY
Qty: 90 TAB | Refills: 0 | Status: SHIPPED | OUTPATIENT
Start: 2018-09-18 | End: 2018-10-18

## 2018-08-21 RX ORDER — ACYCLOVIR 50 MG/G
OINTMENT TOPICAL DAILY
COMMUNITY
Start: 2018-07-16 | End: 2020-01-02

## 2018-08-21 RX ORDER — LEVOTHYROXINE SODIUM 0.05 MG/1
50 TABLET ORAL EVERY MORNING
COMMUNITY
Start: 2018-08-18 | End: 2018-12-13

## 2018-08-21 RX ORDER — NAPROXEN 500 MG/1
500 TABLET ORAL 2 TIMES DAILY
COMMUNITY
Start: 2018-08-20 | End: 2021-02-04

## 2018-08-21 RX ORDER — MONTELUKAST SODIUM 10 MG/1
10 TABLET ORAL DAILY
COMMUNITY
Start: 2018-08-09

## 2018-08-21 RX ORDER — ARIPIPRAZOLE 20 MG/1
20 TABLET ORAL DAILY
Qty: 90 TAB | Refills: 1 | Status: SHIPPED | OUTPATIENT
Start: 2018-08-21 | End: 2018-12-13

## 2018-08-21 RX ORDER — VENLAFAXINE HYDROCHLORIDE 150 MG/1
150 CAPSULE, EXTENDED RELEASE ORAL DAILY
Qty: 90 CAP | Refills: 1 | Status: SHIPPED | OUTPATIENT
Start: 2018-08-21 | End: 2018-12-13 | Stop reason: SDUPTHER

## 2018-08-21 RX ORDER — DEXTROAMPHETAMINE SACCHARATE, AMPHETAMINE ASPARTATE, DEXTROAMPHETAMINE SULFATE AND AMPHETAMINE SULFATE 3.75; 3.75; 3.75; 3.75 MG/1; MG/1; MG/1; MG/1
15 TABLET ORAL 3 TIMES DAILY
Qty: 90 TAB | Refills: 0 | Status: SHIPPED | OUTPATIENT
Start: 2018-10-17 | End: 2018-11-16

## 2018-08-21 RX ORDER — FENOFIBRATE 145 MG/1
145 TABLET, COATED ORAL DAILY
COMMUNITY
Start: 2018-07-12 | End: 2019-09-16

## 2018-08-21 RX ORDER — VALACYCLOVIR HYDROCHLORIDE 1 G/1
1 TABLET, FILM COATED ORAL DAILY
COMMUNITY
Start: 2018-07-12 | End: 2019-09-16

## 2018-08-21 RX ORDER — DEXTROAMPHETAMINE SACCHARATE, AMPHETAMINE ASPARTATE, DEXTROAMPHETAMINE SULFATE AND AMPHETAMINE SULFATE 3.75; 3.75; 3.75; 3.75 MG/1; MG/1; MG/1; MG/1
15 TABLET ORAL 3 TIMES DAILY
Qty: 90 TAB | Refills: 0 | Status: SHIPPED | OUTPATIENT
Start: 2018-11-15 | End: 2018-12-13 | Stop reason: SDUPTHER

## 2018-08-21 RX ORDER — GABAPENTIN 300 MG/1
300 CAPSULE ORAL 3 TIMES DAILY
COMMUNITY
Start: 2018-07-27 | End: 2018-12-13

## 2018-08-21 ASSESSMENT — PATIENT HEALTH QUESTIONNAIRE - PHQ9: CLINICAL INTERPRETATION OF PHQ2 SCORE: 0

## 2018-08-21 NOTE — PROGRESS NOTES
PSYCHIATRY FOLLOW-UP NOTE      Chief Complaint   Patient presents with   • Follow-Up     ADHD, depression         History Of Present Illness:  Alesia Cesar is a 63 y.o. old female with major depressive disorder, ADHD, dyslipidemia, fibromyalgia, hypothyroidism comes in today for follow up, was last seen for by Dr. uMnoz over 3 months ago.  She reports doing good in regards to her depression since her last visit here.  She has been on Effexor for over 15 years and on Abilify for 10 years.  She states that she has struggled with depression since her childhood and is currently on disability for the same.  She has tried several different medications but has been doing good on this combination of Effexor and Abilify.  She was recently tried on a lower dose of Abilify but she had more intrusive thoughts and was unable to decrease it to a lower dose.  She was diagnosed with ADHD over 20 years ago and has been on Adderall for the same.  She was previously prescribed 30 mg twice daily which helped her significantly.  Her dose has been tapered down and she is currently on 15 mg 3 times a day.  She feels that it does help but she still struggles with keeping her house clean because of her lack of focus and easy distraction.  She is currently not working and her only impairment from partially treated ADHD is inability to complete her household work.  She denies having headaches or interruptions in her sleep or appetite with Adderall.  She takes Adderall 7 in the morning, 11 in the afternoon and 3 in the afternoon.  She has some psychosocial stressors from her older sister who is struggling with Parkinson's disease and she takes care of her.  Denies anhedonia.  She has noticed worsening fatigue.  She does have fibromyalgia and states that her symptoms very from chronic pain to fatigue.  Denies current symptoms with anxiety.  Denies prior or current symptoms consistent with hypomania, aga or psychosis.  Denies having thoughts of  wanting to hurt herself or others.    Social History:   She is currently single, has been  and  ×1 and has no kids.  She lives alone in [place].  She has 3 sisters and a brother and is close with all of them.  She is on disability for her psychiatric illness.    Substance Use:  Alcohol - Denies  Nicotine - Smokes 1 PPD  Illicit drugs - Smokes cannabis recreationally twice a month    Past Medication Trials:  Prozac, Paxil, Zoloft, Celexa, Lexapro, Wellbutrin, Cymbalta, Adderall XR     Medications:  Current Outpatient Prescriptions   Medication Sig Dispense Refill   • fenofibrate (TRICOR) 145 MG Tab Take 145 mg by mouth every day.     • gabapentin (NEURONTIN) 300 MG Cap Take 300 mg by mouth 3 times a day.     • acyclovir (ZOVIRAX) 5 % Ointment every day.     • fluticasone (FLONASE) 50 MCG/ACT nasal spray      • levothyroxine (SYNTHROID) 50 MCG Tab Take 50 mcg by mouth every morning.     • montelukast (SINGULAIR) 10 MG Tab Take 10 mg by mouth every day.     • valacyclovir (VALTREX) 1 GM Tab Take 1 Tab by mouth every day.     • naproxen (NAPROSYN) 500 MG Tab Take 500 mg by mouth 2 Times a Day.     • [START ON 9/18/2018] amphetamine-dextroamphetamine (ADDERALL, 15MG,) 15 MG tablet Take 1 Tab by mouth 3 times a day for 30 days. 90 Tab 0   • [START ON 10/17/2018] amphetamine-dextroamphetamine (ADDERALL) 15 MG tablet Take 1 Tab by mouth 3 times a day for 30 days. 90 Tab 0   • [START ON 11/15/2018] amphetamine-dextroamphetamine (ADDERALL) 15 MG tablet Take 1 Tab by mouth 3 times a day for 30 days. 90 Tab 0   • venlafaxine (EFFEXOR-XR) 150 MG extended-release capsule Take 1 Cap by mouth every day. 90 Cap 1   • aripiprazole (ABILIFY) 20 MG tablet Take 1 Tab by mouth every day. 90 Tab 1     No current facility-administered medications for this visit.        Review Of Systems:    Constitutional - Positive for fatigue  Respiratory - Negative for shortness of breath, cough  CVS - Negative for chest pain,  "palpitations  GI - Negative for nausea, vomiting, abdominal pain, diarrhea, constipation  Musculoskeletal - Negative for back pain  Neurological - Negative for headaches  Psychiatric - Positive for impaired concentration     Physical Examination:  Vital signs: /90   Pulse (!) 108   Ht 1.626 m (5' 4\")   Wt 111.6 kg (246 lb)   BMI 42.23 kg/m²     Musculoskeletal: Normal gait. No abnormal movements.     Mental Status Evaluation:   General: Middle aged white female, dressed in casual attire, good grooming and hygiene, in no apparent distress, calm and cooperative, good eye contact, no psychomotor agitation or retardation  Orientation: Alert and oriented to person, place and time  Recent and remote memory: Grossly intact  Attention span and concentration: Grossly intact  Speech: Spontaneous, normal rate, rhythm and tone  Thought Process: Linear, logical and goal directed  Thought Content: Denies suicidal or homicidal ideations, intent or plan  Perception: Denies auditory or visual hallucinations. No delusions noted  Associations: Intact  Language: Appropriate  Fund of knowledge and vocabulary: Grossly adequate  Mood: \"good\"  Affect: Euthymic, mood congruent  Insight: Good  Judgment: Good    Depression screening:  Depression Screen (PHQ-2/PHQ-9) 8/21/2018   PHQ-2 Total Score 0       Interpretation of PHQ-9 Total Score   Score Severity   1-4 No Depression   5-9 Mild Depression   10-14 Moderate Depression   15-19 Moderately Severe Depression   20-27 Severe Depression    Medical Records/Labs/Diagnostic Tests Reviewed:  [place] [facility] records -appropriate refills, no abuse suspected      Impression:  1.  ADHD, inattentive type  2.  Major depressive disorder, recurrent, in full remission  3.  Chronic fatigue secondary to fibromyalgia  4.  Elevated blood pressure reading  5.  Diabetes mellitus    Plan:  1.  Continue Adderall 15 mg 3 times daily for ADHD.  Provided her with 3 prescriptions for 90 tabs each.  She had elevated " blood pressure and pulse today.  Discussed long-term side effects from stimulant use including palpitations, tachycardia, hypertension.  I informed her that I do not feel comfortable increasing her dose back again to 30 mg twice daily.  I have encouraged her to get a blood pressure monitor at home and to keep a daily log of her blood pressure.  2.  Continue Effexor  mg at bedtime for depression  3.  Continue Abilify 20 mg at bedtime for depression augmentation   - AIMS 0 today   - Metabolic monitoring (3/2018): A1c elevated at 6.8, lipid profile with elevated total cholesterol, triglycerides and LDL  4.  I discussed with her her elevated A1c and advised her to follow up with her PCP for further medication management for her diabetes    Return to clinic in 4 months or sooner if symptoms worsen    The proposed treatment plan was discussed with the patient who was provided the opportunity to ask questions and make suggestions regarding alternative treatment. Patient verbalized understanding and expressed agreement with the plan.     Argentina Brunson M.D.  08/21/18    This note was created using voice recognition software (Dragon). The accuracy of the dictation is limited by the abilities of the software. I have reviewed the note prior to signing, however some errors in grammar and context are still possible. If you have any questions related to this note please do not hesitate to contact our office.

## 2018-12-13 ENCOUNTER — OFFICE VISIT (OUTPATIENT)
Dept: BEHAVIORAL HEALTH | Facility: CLINIC | Age: 63
End: 2018-12-13
Payer: MEDICARE

## 2018-12-13 VITALS
BODY MASS INDEX: 38.58 KG/M2 | WEIGHT: 226 LBS | DIASTOLIC BLOOD PRESSURE: 78 MMHG | HEIGHT: 64 IN | HEART RATE: 77 BPM | SYSTOLIC BLOOD PRESSURE: 110 MMHG

## 2018-12-13 DIAGNOSIS — F90.0 ADHD (ATTENTION DEFICIT HYPERACTIVITY DISORDER), INATTENTIVE TYPE: ICD-10-CM

## 2018-12-13 DIAGNOSIS — F33.42 MDD (MAJOR DEPRESSIVE DISORDER), RECURRENT, IN FULL REMISSION (HCC): ICD-10-CM

## 2018-12-13 DIAGNOSIS — R53.82 CHRONIC FATIGUE: ICD-10-CM

## 2018-12-13 PROBLEM — R03.0 ELEVATED BP WITHOUT DIAGNOSIS OF HYPERTENSION: Status: RESOLVED | Noted: 2018-08-21 | Resolved: 2018-12-13

## 2018-12-13 PROCEDURE — 99214 OFFICE O/P EST MOD 30 MIN: CPT | Performed by: PSYCHIATRY & NEUROLOGY

## 2018-12-13 RX ORDER — ATORVASTATIN CALCIUM 20 MG/1
20 TABLET, FILM COATED ORAL DAILY
COMMUNITY
Start: 2018-10-11

## 2018-12-13 RX ORDER — VENLAFAXINE HYDROCHLORIDE 150 MG/1
150 CAPSULE, EXTENDED RELEASE ORAL DAILY
Qty: 90 CAP | Refills: 0 | Status: SHIPPED | OUTPATIENT
Start: 2018-12-13 | End: 2019-03-13 | Stop reason: SDUPTHER

## 2018-12-13 RX ORDER — DEXTROAMPHETAMINE SACCHARATE, AMPHETAMINE ASPARTATE, DEXTROAMPHETAMINE SULFATE AND AMPHETAMINE SULFATE 3.75; 3.75; 3.75; 3.75 MG/1; MG/1; MG/1; MG/1
15 TABLET ORAL 3 TIMES DAILY
Qty: 90 TAB | Refills: 0 | Status: SHIPPED | OUTPATIENT
Start: 2019-02-18 | End: 2019-03-13 | Stop reason: SDUPTHER

## 2018-12-13 RX ORDER — LEVOTHYROXINE SODIUM 0.07 MG/1
75 TABLET ORAL EVERY MORNING
COMMUNITY
Start: 2018-10-11 | End: 2019-03-13

## 2018-12-13 RX ORDER — DEXTROAMPHETAMINE SACCHARATE, AMPHETAMINE ASPARTATE, DEXTROAMPHETAMINE SULFATE AND AMPHETAMINE SULFATE 3.75; 3.75; 3.75; 3.75 MG/1; MG/1; MG/1; MG/1
15 TABLET ORAL 3 TIMES DAILY
Qty: 90 TAB | Refills: 0 | Status: SHIPPED | OUTPATIENT
Start: 2019-01-20 | End: 2019-02-19

## 2018-12-13 RX ORDER — ARIPIPRAZOLE 15 MG/1
15 TABLET ORAL DAILY
Qty: 90 TAB | Refills: 0 | Status: SHIPPED | OUTPATIENT
Start: 2018-12-13 | End: 2019-03-13

## 2018-12-13 RX ORDER — TRIAMCINOLONE ACETONIDE 1 MG/G
CREAM TOPICAL
COMMUNITY
Start: 2018-10-11 | End: 2020-01-02

## 2018-12-13 RX ORDER — CLOTRIMAZOLE 1 %
CREAM (GRAM) TOPICAL
COMMUNITY
Start: 2018-10-11 | End: 2020-01-02

## 2018-12-13 NOTE — PROGRESS NOTES
PSYCHIATRY FOLLOW-UP NOTE      Chief Complaint   Patient presents with   • Follow-Up     depression, ADHD         History Of Present Illness:  Alesia Cesar is a 63 y.o. old female with major depressive disorder, ADHD, dyslipidemia, fibromyalgia, hypothyroidism, diabetes mellitus type 2 comes in today for follow up, was last seen 3 months ago.  She reports doing good in regards to her depression since her last visit with me.  She followed up with her PCP and was diagnosed with type 2 diabetes mellitus and started on metformin and she is feeling really good about it.  She has also been working on her appetite and has lost weight which probably has helped as well.  She has been having a tremor in her upper extremity on and off for the last 3 or 4 months and her PCP tried propanolol which did not help and she has been referred to neurology for further workup of possible Parkinson's.  She has an older sister who is currently struggling with Parkinson's disease and she is worried about it as well.  She has been on Abilify for over 10 years and feels that Abilify has really helped her depression.  She is doing all right in regards to her depression and denies any significant active symptoms of depression.  Sleep and appetite have been good.  Denies anhedonia or any new increase in her psychosocial stressors.  She has been doing all right in regards to her ADHD.  She is taking her Adderall 3 times a day but still struggles with impaired focus and concentration at times and feels that not being on 60 mg has deteriorated some of her symptoms.  She denies any recent reckless or impulsive behaviors.  She denies having thoughts of wanting to hurt herself or others.    Social History:   She is currently single, has been  and  ×1 and has no kids.  She lives alone in [place].  She has 3 sisters and a brother and is close with all of them.  She is on disability for her psychiatric illness.    Substance Use:  Alcohol -  Denies  Nicotine - Smokes 1 PPD  Illicit drugs - Smokes cannabis recreationally twice a month    Past Medication Trials:  Prozac, Paxil, Zoloft, Celexa, Lexapro, Wellbutrin, Cymbalta, Adderall XR     Medications:  Current Outpatient Prescriptions   Medication Sig Dispense Refill   • metFORMIN (GLUCOPHAGE) 500 MG Tab Take 500 mg by mouth 2 Times a Day.     • levothyroxine (SYNTHROID) 75 MCG Tab Take 75 mcg by mouth every morning.     • atorvastatin (LIPITOR) 20 MG Tab Take 20 mg by mouth every day.     • [START ON 1/20/2019] amphetamine-dextroamphetamine (ADDERALL) 15 MG tablet Take 1 Tab by mouth 3 times a day for 30 days. 90 Tab 0   • [START ON 2/18/2019] amphetamine-dextroamphetamine (ADDERALL) 15 MG tablet Take 1 Tab by mouth 3 times a day for 30 days. 90 Tab 0   • venlafaxine (EFFEXOR-XR) 150 MG extended-release capsule Take 1 Cap by mouth every day. 90 Cap 0   • ARIPiprazole (ABILIFY) 15 MG Tab Take 1 Tab by mouth every day. 90 Tab 0   • montelukast (SINGULAIR) 10 MG Tab Take 10 mg by mouth every day.     • naproxen (NAPROSYN) 500 MG Tab Take 500 mg by mouth 2 Times a Day.     • triamcinolone acetonide (KENALOG) 0.1 % Cream      • clotrimazole (LOTRIMIN) 1 % Cream      • fenofibrate (TRICOR) 145 MG Tab Take 145 mg by mouth every day.     • acyclovir (ZOVIRAX) 5 % Ointment every day.     • fluticasone (FLONASE) 50 MCG/ACT nasal spray      • valacyclovir (VALTREX) 1 GM Tab Take 1 Tab by mouth every day.       No current facility-administered medications for this visit.        Review Of Systems:    Constitutional - Positive for fatigue  Respiratory - Negative for shortness of breath, cough  CVS - Negative for chest pain, palpitations  GI - Negative for nausea, vomiting, abdominal pain, diarrhea, constipation  Musculoskeletal - Negative for back pain  Neurological - Negative for headaches. Positive for UE tremor (right > left)  Psychiatric - Positive for impaired concentration     Physical Examination:  Vital  "signs: /78   Pulse 77   Ht 1.626 m (5' 4\")   Wt 102.5 kg (226 lb)   BMI 38.79 kg/m²     Musculoskeletal: Normal gait. No abnormal movements.     Mental Status Evaluation:   General: Middle aged white female, dressed in casual attire, good grooming and hygiene, in no apparent distress, calm and cooperative, good eye contact, no psychomotor agitation or retardation  Orientation: Alert and oriented to person, place and time  Recent and remote memory: Grossly intact  Attention span and concentration: Grossly intact  Speech: Spontaneous, normal rate, rhythm and tone  Thought Process: Linear, logical and goal directed  Thought Content: Denies suicidal or homicidal ideations, intent or plan  Perception: Denies auditory or visual hallucinations. No delusions noted  Associations: Intact  Language: Appropriate  Fund of knowledge and vocabulary: Grossly adequate  Mood: \"fine\"  Affect: Euthymic, mood congruent  Insight: Good  Judgment: Good    Depression screening:  Depression Screen (PHQ-2/PHQ-9) 8/21/2018   PHQ-2 Total Score 0     Interpretation of PHQ-9 Total Score   Score Severity   1-4 No Depression   5-9 Mild Depression   10-14 Moderate Depression   15-19 Moderately Severe Depression   20-27 Severe Depression    Medical Records/Labs/Diagnostic Tests Reviewed:  [place]  records -appropriate refills, no abuse suspected      Impression:  1.  ADHD, inattentive type - stable, not at goal   2.  Major depressive disorder, recurrent, in full remission - stable  3.  Chronic fatigue secondary to fibromyalgia - stable    Plan:  1.  Continue Adderall 15 mg 3 times daily for ADHD.  Provided her with 2 prescriptions for 90 tabs each.  She still has one prescription on hold at her pharmacy   - 20 lbs intentional weight loss in 4 months   - Blood pressure and pulse stable  2.  Continue Effexor  mg at bedtime for depression  3.  Decrease Abilify to 15 mg at bedtime for depression augmentation.  She is having tremors in her " upper extremities and I appreciated some rigidity in her left upper extremity.  I have discussed side effects from Abilify including tremor and parkinsonian-like symptoms and it is worthwhile giving a trial of Abilify taper down to see if it improves her symptoms.  She does have a family history of Parkinson's and her older sister.  She has been referred to neurology and will be calling for an appointment as soon as possible.    Return to clinic in 3 months or sooner if symptoms worsen    The proposed treatment plan was discussed with the patient who was provided the opportunity to ask questions and make suggestions regarding alternative treatment. Patient verbalized understanding and expressed agreement with the plan.     Argentina Brunson M.D.  12/13/18    This note was created using voice recognition software (Dragon). The accuracy of the dictation is limited by the abilities of the software. I have reviewed the note prior to signing, however some errors in grammar and context are still possible. If you have any questions related to this note please do not hesitate to contact our office.

## 2019-01-26 ENCOUNTER — HOSPITAL ENCOUNTER (OUTPATIENT)
Dept: LAB | Facility: MEDICAL CENTER | Age: 64
End: 2019-01-26
Attending: FAMILY MEDICINE
Payer: MEDICARE

## 2019-01-26 LAB
25(OH)D3 SERPL-MCNC: 38 NG/ML (ref 30–100)
EST. AVERAGE GLUCOSE BLD GHB EST-MCNC: 140 MG/DL
HBA1C MFR BLD: 6.5 % (ref 0–5.6)
TSH SERPL DL<=0.005 MIU/L-ACNC: 4.37 UIU/ML (ref 0.38–5.33)

## 2019-01-26 PROCEDURE — 83036 HEMOGLOBIN GLYCOSYLATED A1C: CPT | Mod: GA

## 2019-01-26 PROCEDURE — 36415 COLL VENOUS BLD VENIPUNCTURE: CPT | Mod: GA

## 2019-01-26 PROCEDURE — 82306 VITAMIN D 25 HYDROXY: CPT

## 2019-01-26 PROCEDURE — 84443 ASSAY THYROID STIM HORMONE: CPT

## 2019-01-28 ENCOUNTER — HOSPITAL ENCOUNTER (OUTPATIENT)
Dept: LAB | Facility: MEDICAL CENTER | Age: 64
End: 2019-01-28
Attending: FAMILY MEDICINE
Payer: MEDICARE

## 2019-01-28 LAB
CREAT UR-MCNC: 33 MG/DL
MICROALBUMIN UR-MCNC: <0.7 MG/DL
MICROALBUMIN/CREAT UR: NORMAL MG/G (ref 0–30)

## 2019-01-28 PROCEDURE — 82570 ASSAY OF URINE CREATININE: CPT

## 2019-01-28 PROCEDURE — 82043 UR ALBUMIN QUANTITATIVE: CPT

## 2019-03-13 ENCOUNTER — OFFICE VISIT (OUTPATIENT)
Dept: BEHAVIORAL HEALTH | Facility: CLINIC | Age: 64
End: 2019-03-13
Payer: MEDICARE

## 2019-03-13 VITALS
HEART RATE: 105 BPM | HEIGHT: 64 IN | DIASTOLIC BLOOD PRESSURE: 85 MMHG | BODY MASS INDEX: 37.22 KG/M2 | SYSTOLIC BLOOD PRESSURE: 145 MMHG | WEIGHT: 218 LBS

## 2019-03-13 DIAGNOSIS — F33.42 MDD (MAJOR DEPRESSIVE DISORDER), RECURRENT, IN FULL REMISSION (HCC): ICD-10-CM

## 2019-03-13 DIAGNOSIS — F90.0 ADHD (ATTENTION DEFICIT HYPERACTIVITY DISORDER), INATTENTIVE TYPE: ICD-10-CM

## 2019-03-13 DIAGNOSIS — R53.82 CHRONIC FATIGUE: ICD-10-CM

## 2019-03-13 PROCEDURE — 99214 OFFICE O/P EST MOD 30 MIN: CPT | Performed by: PSYCHIATRY & NEUROLOGY

## 2019-03-13 RX ORDER — LEVOTHYROXINE SODIUM 88 UG/1
88 TABLET ORAL EVERY MORNING
COMMUNITY
Start: 2019-03-01 | End: 2024-03-06

## 2019-03-13 RX ORDER — DEXTROAMPHETAMINE SACCHARATE, AMPHETAMINE ASPARTATE, DEXTROAMPHETAMINE SULFATE AND AMPHETAMINE SULFATE 3.75; 3.75; 3.75; 3.75 MG/1; MG/1; MG/1; MG/1
15 TABLET ORAL 3 TIMES DAILY
Qty: 90 TAB | Refills: 0 | Status: SHIPPED | OUTPATIENT
Start: 2019-03-19 | End: 2019-04-18

## 2019-03-13 RX ORDER — ARIPIPRAZOLE 10 MG/1
10 TABLET ORAL DAILY
Qty: 90 TAB | Refills: 0 | Status: SHIPPED | OUTPATIENT
Start: 2019-03-13 | End: 2019-06-12 | Stop reason: SDUPTHER

## 2019-03-13 RX ORDER — DEXTROAMPHETAMINE SACCHARATE, AMPHETAMINE ASPARTATE, DEXTROAMPHETAMINE SULFATE AND AMPHETAMINE SULFATE 3.75; 3.75; 3.75; 3.75 MG/1; MG/1; MG/1; MG/1
15 TABLET ORAL 3 TIMES DAILY
Qty: 90 TAB | Refills: 0 | Status: SHIPPED | OUTPATIENT
Start: 2019-05-16 | End: 2019-06-12 | Stop reason: SDUPTHER

## 2019-03-13 RX ORDER — VENLAFAXINE HYDROCHLORIDE 150 MG/1
150 CAPSULE, EXTENDED RELEASE ORAL DAILY
Qty: 90 CAP | Refills: 0 | Status: SHIPPED | OUTPATIENT
Start: 2019-03-13 | End: 2019-06-12 | Stop reason: SDUPTHER

## 2019-03-13 RX ORDER — DEXTROAMPHETAMINE SACCHARATE, AMPHETAMINE ASPARTATE, DEXTROAMPHETAMINE SULFATE AND AMPHETAMINE SULFATE 3.75; 3.75; 3.75; 3.75 MG/1; MG/1; MG/1; MG/1
15 TABLET ORAL 3 TIMES DAILY
Qty: 90 TAB | Refills: 0 | Status: SHIPPED | OUTPATIENT
Start: 2019-04-17 | End: 2019-05-17

## 2019-03-13 ASSESSMENT — ABNORMAL INVOLUNTARY MOVEMENT SCALE (AIMS)
AIMS_PATIENT_INCAPACITATION: NONE, NORMAL
LOWER_BODY_EXTREMITIES: NONE, NORMAL
AIMS_PATIENT_AWARENESS: NO AWARENESS
JAW: NONE, NORMAL
PATIENT_WEARS_DENTURES: NO
FACIAL_EXPRESSION_MUSCLES: NONE, NORMAL
NECK_SHOULDER_HIPS: NONE, NORMAL
UPPER_BODY_EXTREMITIES: NONE, NORMAL
CURRENT_PROBLEMS_TEETH_DENTURES: NO
TONGUE: NONE, NORMAL
AIMS_SEVERITY: 0
LIPS_PARIETAL: NONE, NORMAL

## 2019-03-13 NOTE — PROGRESS NOTES
PSYCHIATRY FOLLOW-UP NOTE      Chief Complaint   Patient presents with   • Follow-Up     ADHD, depression         History Of Present Illness:  Alesia Cesar is a 63 y.o. old female with major depressive disorder, ADHD, dyslipidemia, fibromyalgia, hypothyroidism, diabetes mellitus type 2 comes in today for follow up, was last seen 3 months ago.  She has had a few deaths in her life which is because some grief.  She lost her 13-year-old cat as well as her sister's caretaker whom she had known for over 30 years.  She is trying to hold it together but is experiencing some sadness.  She was able to decrease her dose of Abilify to 15 and did not notice any improvement in her tremor.  She has not scheduled an appointment with a neurologist but plans on doing it soon.  She still struggling with fatigue and has noticed a difference with the lower dose of Adderall.  She would still like to be on 60 mg of Adderall she feels that she cannot even read a book on an instruction without Adderall.  She does feel that Adderall helps her a little bit with the energy 60 mg was a lot more helpful.  Sleep and appetite have been good.  She denies having thoughts of wanting to hurt herself or others.    Social History:   She is currently single, has been  and  ×1 and has no kids.  She lives alone in [place].  She has 3 sisters and a brother and is close with all of them.  She is on disability for her psychiatric illness.    Substance Use:  Alcohol - Denies  Nicotine - Smokes 1 PPD  Illicit drugs - Smokes cannabis recreationally once or twice a month    Past Medication Trials:  Prozac, Paxil, Zoloft, Celexa, Lexapro, Wellbutrin, Cymbalta, Adderall XR     Medications:  Current Outpatient Prescriptions   Medication Sig Dispense Refill   • [START ON 3/19/2019] amphetamine-dextroamphetamine (ADDERALL) 15 MG tablet Take 1 Tab by mouth 3 times a day for 30 days. 90 Tab 0   • [START ON 4/17/2019] amphetamine-dextroamphetamine (ADDERALL)  "15 MG tablet Take 1 Tab by mouth 3 times a day for 30 days. 90 Tab 0   • [START ON 5/16/2019] amphetamine-dextroamphetamine (ADDERALL) 15 MG tablet Take 1 Tab by mouth 3 times a day for 30 days. 90 Tab 0   • levothyroxine (SYNTHROID) 88 MCG Tab Take 88 mcg by mouth every morning.     • ARIPiprazole (ABILIFY) 10 MG Tab Take 1 Tab by mouth every day. 90 Tab 0   • venlafaxine (EFFEXOR-XR) 150 MG extended-release capsule Take 1 Cap by mouth every day. 90 Cap 0   • metFORMIN (GLUCOPHAGE) 850 MG Tab Take 850 mg by mouth 2 Times a Day.     • atorvastatin (LIPITOR) 20 MG Tab Take 20 mg by mouth every day.     • triamcinolone acetonide (KENALOG) 0.1 % Cream      • clotrimazole (LOTRIMIN) 1 % Cream      • fenofibrate (TRICOR) 145 MG Tab Take 145 mg by mouth every day.     • acyclovir (ZOVIRAX) 5 % Ointment every day.     • fluticasone (FLONASE) 50 MCG/ACT nasal spray      • montelukast (SINGULAIR) 10 MG Tab Take 10 mg by mouth every day.     • valacyclovir (VALTREX) 1 GM Tab Take 1 Tab by mouth every day.     • naproxen (NAPROSYN) 500 MG Tab Take 500 mg by mouth 2 Times a Day.       No current facility-administered medications for this visit.        Review Of Systems:    Constitutional - Positive for fatigue  Respiratory - Negative for shortness of breath, cough  CVS - Negative for chest pain, palpitations  GI - Negative for nausea, vomiting, abdominal pain, diarrhea, constipation  Musculoskeletal - Negative for back pain  Neurological - Negative for headaches. Positive for UE tremor (right > left)  Psychiatric - Positive for impaired concentration, sadness    Physical Examination:  Vital signs: /85   Pulse (!) 105   Ht 1.626 m (5' 4\")   Wt 98.9 kg (218 lb)   BMI 37.42 kg/m²     Musculoskeletal: Normal gait. No abnormal movements.     Mental Status Evaluation:   General: Middle aged white female, dressed in casual attire, good grooming and hygiene, in no apparent distress, calm and cooperative, good eye contact, no " "psychomotor agitation or retardation  Orientation: Alert and oriented to person, place and time  Recent and remote memory: Grossly intact  Attention span and concentration: Grossly intact  Speech: Spontaneous, normal rate, rhythm and tone  Thought Process: Linear, logical and goal directed  Thought Content: Denies suicidal or homicidal ideations, intent or plan  Perception: Denies auditory or visual hallucinations. No delusions noted  Associations: Intact  Language: Appropriate  Fund of knowledge and vocabulary: Grossly adequate  Mood: \"am okay\"  Affect: Euthymic, mood congruent  Insight: Good  Judgment: Good    Depression screening:  Depression Screen (PHQ-2/PHQ-9) 8/21/2018   PHQ-2 Total Score 0     Interpretation of PHQ-9 Total Score   Score Severity   1-4 No Depression   5-9 Mild Depression   10-14 Moderate Depression   15-19 Moderately Severe Depression   20-27 Severe Depression    Medical Records/Labs/Diagnostic Tests Reviewed:  [facility] records -appropriate refills, no abuse suspected      Impression:  1.  ADHD, inattentive type - stable  2.  Major depressive disorder, recurrent, in full remission - stable  3.  Chronic fatigue secondary to fibromyalgia - stable    Plan:  1.  Continue Adderall 15 mg 3 times daily for ADHD.  Provided her with 3 prescriptions for 90 tabs each.   2.  Continue Effexor  mg at bedtime for depression  3.  Decrease Abilify to 10 mg at bedtime for depression augmentation. She is still experiencing upper extremity tremor and some rigidity and I will try to taper her off Abilify slowly. I again advised her to schedule an appointment with Neurology.   - AIMS 0 today   - Metabolic monitoring (1/2019): A1c elevated at 6.5   - Will check lipid profile at next appointment    Return to clinic in 3 months or sooner if symptoms worsen    The proposed treatment plan was discussed with the patient who was provided the opportunity to ask questions and make suggestions regarding alternative " treatment. Patient verbalized understanding and expressed agreement with the plan.     Argentina Brunson M.D.  03/13/19    This note was created using voice recognition software (Dragon). The accuracy of the dictation is limited by the abilities of the software. I have reviewed the note prior to signing, however some errors in grammar and context are still possible. If you have any questions related to this note please do not hesitate to contact our office.

## 2019-05-28 ENCOUNTER — TRANSCRIPTION ENCOUNTER (OUTPATIENT)
Age: 64
End: 2019-05-28

## 2019-06-12 ENCOUNTER — OFFICE VISIT (OUTPATIENT)
Dept: BEHAVIORAL HEALTH | Facility: CLINIC | Age: 64
End: 2019-06-12
Payer: MEDICARE

## 2019-06-12 VITALS
DIASTOLIC BLOOD PRESSURE: 72 MMHG | HEIGHT: 64 IN | SYSTOLIC BLOOD PRESSURE: 148 MMHG | BODY MASS INDEX: 36.88 KG/M2 | WEIGHT: 216 LBS | HEART RATE: 95 BPM

## 2019-06-12 DIAGNOSIS — F90.0 ADHD (ATTENTION DEFICIT HYPERACTIVITY DISORDER), INATTENTIVE TYPE: ICD-10-CM

## 2019-06-12 DIAGNOSIS — F33.42 MDD (MAJOR DEPRESSIVE DISORDER), RECURRENT, IN FULL REMISSION (HCC): ICD-10-CM

## 2019-06-12 DIAGNOSIS — R53.82 CHRONIC FATIGUE: ICD-10-CM

## 2019-06-12 DIAGNOSIS — Z79.899 ENCOUNTER FOR LONG-TERM (CURRENT) USE OF HIGH-RISK MEDICATION: ICD-10-CM

## 2019-06-12 PROCEDURE — 99214 OFFICE O/P EST MOD 30 MIN: CPT | Performed by: PSYCHIATRY & NEUROLOGY

## 2019-06-12 RX ORDER — DEXTROAMPHETAMINE SACCHARATE, AMPHETAMINE ASPARTATE, DEXTROAMPHETAMINE SULFATE AND AMPHETAMINE SULFATE 3.75; 3.75; 3.75; 3.75 MG/1; MG/1; MG/1; MG/1
15 TABLET ORAL 3 TIMES DAILY
Qty: 90 TAB | Refills: 0 | Status: SHIPPED | OUTPATIENT
Start: 2019-06-13 | End: 2019-07-13

## 2019-06-12 RX ORDER — VENLAFAXINE HYDROCHLORIDE 150 MG/1
150 CAPSULE, EXTENDED RELEASE ORAL DAILY
Qty: 90 CAP | Refills: 0 | Status: SHIPPED | OUTPATIENT
Start: 2019-06-12 | End: 2019-09-16 | Stop reason: SDUPTHER

## 2019-06-12 RX ORDER — DEXTROAMPHETAMINE SACCHARATE, AMPHETAMINE ASPARTATE, DEXTROAMPHETAMINE SULFATE AND AMPHETAMINE SULFATE 3.75; 3.75; 3.75; 3.75 MG/1; MG/1; MG/1; MG/1
15 TABLET ORAL 3 TIMES DAILY
Qty: 90 TAB | Refills: 0 | Status: SHIPPED | OUTPATIENT
Start: 2019-08-10 | End: 2019-09-04 | Stop reason: SDUPTHER

## 2019-06-12 RX ORDER — ARIPIPRAZOLE 10 MG/1
10 TABLET ORAL DAILY
Qty: 90 TAB | Refills: 0 | Status: SHIPPED | OUTPATIENT
Start: 2019-06-12 | End: 2019-09-09 | Stop reason: SDUPTHER

## 2019-06-12 RX ORDER — DEXTROAMPHETAMINE SACCHARATE, AMPHETAMINE ASPARTATE, DEXTROAMPHETAMINE SULFATE AND AMPHETAMINE SULFATE 3.75; 3.75; 3.75; 3.75 MG/1; MG/1; MG/1; MG/1
15 TABLET ORAL 3 TIMES DAILY
Qty: 90 TAB | Refills: 0 | Status: SHIPPED | OUTPATIENT
Start: 2019-07-12 | End: 2019-08-11

## 2019-06-12 ASSESSMENT — PATIENT HEALTH QUESTIONNAIRE - PHQ9: CLINICAL INTERPRETATION OF PHQ2 SCORE: 1

## 2019-06-12 NOTE — PROGRESS NOTES
PSYCHIATRY FOLLOW-UP NOTE      Chief Complaint   Patient presents with   • Follow-Up     depression, ADHD         History Of Present Illness:  Alesia Cesar is a 63 y.o. old female with major depressive disorder, ADHD, dyslipidemia, fibromyalgia, hypothyroidism, diabetes mellitus type 2 comes in today for follow up, was last seen 3 months ago.  She has been doing all right in regards to her mental health since her last visit with me.  She was able to cut down on her dose of Abilify to 10 mg and has noticed more negative thoughts.  She is still grieving the death of her cat whom she had for 13 years.  Her sister is not interested in letting her get another pet at this time.  She has not noticed any changes in her tremor with the lower dose of Abilify.  She is pretty sure that it is Parkinson's disease and has an appointment with neurology next week to discuss this further.  She has been compliant with her Effexor as well and feels that the combination helps her and she does not want to cut back on her dose of Abilify anymore.  She continues to take her Adderall 3 times a day and endorses benefit on her focus and concentration as well as with fatigue.  Sleep and appetite have been good.  She denies any other new psychosocial stressors.  She denies having thoughts of wanting to hurt herself or others.    Social History:   She is currently single, has been  and  ×1 and has no kids.  She lives alone in [place].  She has 3 sisters and a brother and is close with all of them.  She is on disability for her psychiatric illness.    Substance Use:  Alcohol - Denies  Nicotine - Smokes 1 PPD  Illicit drugs - Smokes cannabis recreationally     Past Medication Trials:  Prozac, Paxil, Zoloft, Celexa, Lexapro, Wellbutrin, Cymbalta, Adderall XR     Medications:  Current Outpatient Prescriptions   Medication Sig Dispense Refill   • amphetamine-dextroamphetamine (ADDERALL) 15 MG tablet Take 1 Tab by mouth 3 times a day for  "30 days. 90 Tab 0   • metFORMIN (GLUCOPHAGE) 850 MG Tab Take 850 mg by mouth 2 Times a Day.     • levothyroxine (SYNTHROID) 88 MCG Tab Take 88 mcg by mouth every morning.     • ARIPiprazole (ABILIFY) 10 MG Tab Take 1 Tab by mouth every day. 90 Tab 0   • venlafaxine (EFFEXOR-XR) 150 MG extended-release capsule Take 1 Cap by mouth every day. 90 Cap 0   • atorvastatin (LIPITOR) 20 MG Tab Take 20 mg by mouth every day.     • fenofibrate (TRICOR) 145 MG Tab Take 145 mg by mouth every day.     • montelukast (SINGULAIR) 10 MG Tab Take 10 mg by mouth every day.     • triamcinolone acetonide (KENALOG) 0.1 % Cream      • clotrimazole (LOTRIMIN) 1 % Cream      • acyclovir (ZOVIRAX) 5 % Ointment every day.     • fluticasone (FLONASE) 50 MCG/ACT nasal spray      • valacyclovir (VALTREX) 1 GM Tab Take 1 Tab by mouth every day.     • naproxen (NAPROSYN) 500 MG Tab Take 500 mg by mouth 2 Times a Day.       No current facility-administered medications for this visit.        Review Of Systems:    Constitutional - Positive for fatigue  Respiratory - Negative for shortness of breath, cough  CVS - Negative for chest pain, palpitations  GI - Negative for nausea, vomiting, abdominal pain, diarrhea, constipation  Musculoskeletal - Negative for back pain  Neurological - Negative for headaches. Positive for upper extremity tremor (right > left)  Psychiatric - Positive for impaired concentration, depression    Physical Examination:  Vital signs: /72   Pulse 95   Ht 1.626 m (5' 4\")   Wt 98 kg (216 lb)   BMI 37.08 kg/m²     Musculoskeletal: Normal gait. No abnormal movements.     Mental Status Evaluation:   General: Middle aged white female, dressed in casual attire, good grooming and hygiene, in no apparent distress, calm and cooperative, good eye contact, no psychomotor agitation or retardation  Orientation: Alert and oriented to person, place and time  Recent and remote memory: Grossly intact  Attention span and concentration: " "Grossly intact  Speech: Spontaneous, normal rate, rhythm and tone  Thought Process: Linear, logical and goal directed  Thought Content: Denies suicidal or homicidal ideations, intent or plan  Perception: Denies auditory or visual hallucinations. No delusions noted  Associations: Intact  Language: Appropriate  Fund of knowledge and vocabulary: Grossly adequate  Mood: \"am okay\"  Affect: Euthymic, mood congruent  Insight: Good  Judgment: Good    Depression screening:  Depression Screen (PHQ-2/PHQ-9) 8/21/2018 6/12/2019   PHQ-2 Total Score 0 1     Interpretation of PHQ-9 Total Score   Score Severity   1-4 No Depression   5-9 Mild Depression   10-14 Moderate Depression   15-19 Moderately Severe Depression   20-27 Severe Depression    Medical Records/Labs/Diagnostic Tests Reviewed:  [place]  records -appropriate refills, no abuse suspected      Impression:  1.  ADHD, inattentive type - stable  2.  Major depressive disorder, recurrent, in full remission - slight worsening   3.  Chronic fatigue secondary to fibromyalgia - stable  4.  Long term use of medications - Abilify    Plan:  1.  Continue Adderall 15 mg 3 times daily for ADHD.  Provided her with 3 prescriptions for 90 tabs each.   2.  Continue Effexor  mg at bedtime for depression  3.  Continue Abilify 10 mg at bedtime for depression augmentation.  She still having tremor in her upper extremities and does not want to cut back on her dose of Abilify anymore.  She has an upcoming neurology appointment and will discuss possibility of Parkinson's.  - AIMS 0 (3/2019)  - Metabolic monitoring (1/2019): A1c elevated at 6.5  - Lipid panel ordered today    Return to clinic in 3 months or sooner if symptoms worsen    The proposed treatment plan was discussed with the patient who was provided the opportunity to ask questions and make suggestions regarding alternative treatment. Patient verbalized understanding and expressed agreement with the plan.     Argentina Brunson, " M.D.  06/12/19    This note was created using voice recognition software (Dragon). The accuracy of the dictation is limited by the abilities of the software. I have reviewed the note prior to signing, however some errors in grammar and context are still possible. If you have any questions related to this note please do not hesitate to contact our office.

## 2019-06-28 ENCOUNTER — HOSPITAL ENCOUNTER (OUTPATIENT)
Dept: LAB | Facility: MEDICAL CENTER | Age: 64
End: 2019-06-28
Attending: FAMILY MEDICINE
Payer: MEDICARE

## 2019-06-28 LAB
ALBUMIN SERPL BCP-MCNC: 4 G/DL (ref 3.2–4.9)
ALBUMIN/GLOB SERPL: 1.3 G/DL
ALP SERPL-CCNC: 88 U/L (ref 30–99)
ALT SERPL-CCNC: 29 U/L (ref 2–50)
ANION GAP SERPL CALC-SCNC: 9 MMOL/L (ref 0–11.9)
AST SERPL-CCNC: 24 U/L (ref 12–45)
BASOPHILS # BLD AUTO: 0.4 % (ref 0–1.8)
BASOPHILS # BLD: 0.05 K/UL (ref 0–0.12)
BILIRUB SERPL-MCNC: 0.6 MG/DL (ref 0.1–1.5)
BUN SERPL-MCNC: 10 MG/DL (ref 8–22)
CALCIUM SERPL-MCNC: 9.5 MG/DL (ref 8.5–10.5)
CHLORIDE SERPL-SCNC: 103 MMOL/L (ref 96–112)
CHOLEST SERPL-MCNC: 129 MG/DL (ref 100–199)
CO2 SERPL-SCNC: 23 MMOL/L (ref 20–33)
CREAT SERPL-MCNC: 0.75 MG/DL (ref 0.5–1.4)
EOSINOPHIL # BLD AUTO: 0.26 K/UL (ref 0–0.51)
EOSINOPHIL NFR BLD: 2.3 % (ref 0–6.9)
ERYTHROCYTE [DISTWIDTH] IN BLOOD BY AUTOMATED COUNT: 49.8 FL (ref 35.9–50)
GLOBULIN SER CALC-MCNC: 3 G/DL (ref 1.9–3.5)
GLUCOSE SERPL-MCNC: 91 MG/DL (ref 65–99)
HCT VFR BLD AUTO: 43.3 % (ref 37–47)
HDLC SERPL-MCNC: 32 MG/DL
HGB BLD-MCNC: 14.4 G/DL (ref 12–16)
IMM GRANULOCYTES # BLD AUTO: 0.04 K/UL (ref 0–0.11)
IMM GRANULOCYTES NFR BLD AUTO: 0.3 % (ref 0–0.9)
LDLC SERPL CALC-MCNC: 61 MG/DL
LYMPHOCYTES # BLD AUTO: 3.74 K/UL (ref 1–4.8)
LYMPHOCYTES NFR BLD: 32.4 % (ref 22–41)
MCH RBC QN AUTO: 30 PG (ref 27–33)
MCHC RBC AUTO-ENTMCNC: 33.3 G/DL (ref 33.6–35)
MCV RBC AUTO: 90.2 FL (ref 81.4–97.8)
MONOCYTES # BLD AUTO: 0.45 K/UL (ref 0–0.85)
MONOCYTES NFR BLD AUTO: 3.9 % (ref 0–13.4)
NEUTROPHILS # BLD AUTO: 6.99 K/UL (ref 2–7.15)
NEUTROPHILS NFR BLD: 60.7 % (ref 44–72)
NRBC # BLD AUTO: 0 K/UL
NRBC BLD-RTO: 0 /100 WBC
PLATELET # BLD AUTO: 408 K/UL (ref 164–446)
PMV BLD AUTO: 10.7 FL (ref 9–12.9)
POTASSIUM SERPL-SCNC: 4.3 MMOL/L (ref 3.6–5.5)
PROT SERPL-MCNC: 7 G/DL (ref 6–8.2)
RBC # BLD AUTO: 4.8 M/UL (ref 4.2–5.4)
SODIUM SERPL-SCNC: 135 MMOL/L (ref 135–145)
T4 FREE SERPL-MCNC: 1.16 NG/DL (ref 0.53–1.43)
TRIGL SERPL-MCNC: 178 MG/DL (ref 0–149)
TSH SERPL DL<=0.005 MIU/L-ACNC: 0.84 UIU/ML (ref 0.38–5.33)
VIT B12 SERPL-MCNC: 425 PG/ML (ref 211–911)
WBC # BLD AUTO: 11.5 K/UL (ref 4.8–10.8)

## 2019-06-28 PROCEDURE — 84439 ASSAY OF FREE THYROXINE: CPT

## 2019-06-28 PROCEDURE — 83036 HEMOGLOBIN GLYCOSYLATED A1C: CPT

## 2019-06-28 PROCEDURE — 36415 COLL VENOUS BLD VENIPUNCTURE: CPT

## 2019-06-28 PROCEDURE — 80061 LIPID PANEL: CPT

## 2019-06-28 PROCEDURE — 84443 ASSAY THYROID STIM HORMONE: CPT

## 2019-06-28 PROCEDURE — 82607 VITAMIN B-12: CPT

## 2019-06-28 PROCEDURE — 85025 COMPLETE CBC W/AUTO DIFF WBC: CPT

## 2019-06-28 PROCEDURE — 80053 COMPREHEN METABOLIC PANEL: CPT

## 2019-06-29 LAB
EST. AVERAGE GLUCOSE BLD GHB EST-MCNC: 128 MG/DL
HBA1C MFR BLD: 6.1 % (ref 0–5.6)

## 2019-07-19 ENCOUNTER — APPOINTMENT (OUTPATIENT)
Dept: INTERNAL MEDICINE | Facility: CLINIC | Age: 64
End: 2019-07-19

## 2019-07-31 ENCOUNTER — NON-APPOINTMENT (OUTPATIENT)
Age: 64
End: 2019-07-31

## 2019-07-31 ENCOUNTER — APPOINTMENT (OUTPATIENT)
Dept: INTERNAL MEDICINE | Facility: CLINIC | Age: 64
End: 2019-07-31
Payer: MEDICAID

## 2019-07-31 VITALS
HEIGHT: 64 IN | OXYGEN SATURATION: 96 % | RESPIRATION RATE: 16 BRPM | DIASTOLIC BLOOD PRESSURE: 82 MMHG | HEART RATE: 64 BPM | WEIGHT: 228 LBS | BODY MASS INDEX: 38.93 KG/M2 | TEMPERATURE: 97.8 F | SYSTOLIC BLOOD PRESSURE: 112 MMHG

## 2019-07-31 DIAGNOSIS — M19.90 UNSPECIFIED OSTEOARTHRITIS, UNSPECIFIED SITE: ICD-10-CM

## 2019-07-31 DIAGNOSIS — G47.00 INSOMNIA, UNSPECIFIED: ICD-10-CM

## 2019-07-31 DIAGNOSIS — I10 ESSENTIAL (PRIMARY) HYPERTENSION: ICD-10-CM

## 2019-07-31 DIAGNOSIS — M47.812 SPONDYLOSIS W/OUT MYELOPATHY OR RADICULOPATHY, CERVICAL REGION: ICD-10-CM

## 2019-07-31 DIAGNOSIS — M25.571 PAIN IN RIGHT ANKLE AND JOINTS OF RIGHT FOOT: ICD-10-CM

## 2019-07-31 DIAGNOSIS — G89.29 OTHER CHRONIC PAIN: ICD-10-CM

## 2019-07-31 DIAGNOSIS — F41.9 ANXIETY DISORDER, UNSPECIFIED: ICD-10-CM

## 2019-07-31 DIAGNOSIS — M95.4 ACQUIRED DEFORMITY OF CHEST AND RIB: ICD-10-CM

## 2019-07-31 DIAGNOSIS — R94.31 ABNORMAL ELECTROCARDIOGRAM [ECG] [EKG]: ICD-10-CM

## 2019-07-31 DIAGNOSIS — F32.9 MAJOR DEPRESSIVE DISORDER, SINGLE EPISODE, UNSPECIFIED: ICD-10-CM

## 2019-07-31 DIAGNOSIS — E78.2 MIXED HYPERLIPIDEMIA: ICD-10-CM

## 2019-07-31 PROCEDURE — 99386 PREV VISIT NEW AGE 40-64: CPT

## 2019-07-31 RX ORDER — DICLOFENAC POTASSIUM 50 MG/1
TABLET, COATED ORAL TWICE DAILY
Refills: 0 | Status: ACTIVE | COMMUNITY

## 2019-07-31 RX ORDER — MULTIVIT-MIN/FOLIC/VIT K/LYCOP 400-300MCG
25 MCG TABLET ORAL DAILY
Refills: 0 | Status: DISCONTINUED | COMMUNITY

## 2019-07-31 RX ORDER — OXYCODONE HYDROCHLORIDE AND ACETAMINOPHEN 5; 325 MG/1; MG/1
5-325 TABLET ORAL
Refills: 0 | Status: ACTIVE | COMMUNITY

## 2019-07-31 RX ORDER — DULOXETINE HYDROCHLORIDE 30 MG/1
30 CAPSULE, DELAYED RELEASE PELLETS ORAL
Qty: 30 | Refills: 2 | Status: ACTIVE | COMMUNITY
Start: 1900-01-01 | End: 1900-01-01

## 2019-07-31 RX ORDER — ZOLPIDEM TARTRATE 10 MG/1
10 TABLET ORAL
Qty: 30 | Refills: 0 | Status: ACTIVE | COMMUNITY
Start: 1900-01-01 | End: 1900-01-01

## 2019-08-02 RX ORDER — EXEMESTANE 25 MG/1
25 TABLET, FILM COATED ORAL DAILY
Refills: 0 | Status: ACTIVE | COMMUNITY

## 2019-08-02 NOTE — HISTORY OF PRESENT ILLNESS
[FreeTextEntry1] : Patient comes in to establish care.\par  [de-identified] : Patient is a 64-year-old female history of breast CA, cervical arthritis, insomnia, anxiety and depression comes in for annual exam. Is following previously with the [facility] and most recently with PCP . Also sees Pain Management: , Gyn: , and Oncology: . \par Was recently told a previous PCP that x-ray of the spine imaging showed incidental finding on the gallbladder versus bladder, patient is unsure. She will get these records to see her followup imaging is needed.\par She gets her Xanax and Ambien prescriptions refilled by PCP.\par Her Percocet and diclofenac are refilled by pain management.\par She is taking Cymbalta for history of depression as well as nerve pain. She is wondering if she can try tapering down on this medication and she's been on it for many years.\par Over the past few weeks she's had increased amounts of right ankle pain and swelling. She does not have any history of injury or trauma.\par She also noticed swelling of her anterior central chest wall. No cough or other symptoms.\par Patient denies any cp, sob,abdominal pain, nausea, vomiting, palpitations, fever, chills, constipation, diarrhea.\par

## 2019-08-02 NOTE — HEALTH RISK ASSESSMENT
[Yes] : Yes [Monthly or less (1 pt)] : Monthly or less (1 point) [1 or 2 (0 pts)] : 1 or 2 (0 points) [Never (0 pts)] : Never (0 points) [0] : 2) Feeling down, depressed, or hopeless: Not at all (0) [Patient reported mammogram was normal] : Patient reported mammogram was normal [Patient reported PAP Smear was normal] : Patient reported PAP Smear was normal [Smoke Detector] : smoke detector [Carbon Monoxide Detector] : carbon monoxide detector [Seat Belt] :  uses seat belt [Sunscreen] : uses sunscreen [] : No [de-identified] : used to smoke [TTV2Alhht] : 0 [Patient reported bone density results were normal] : Patient reported bone density results were normal [Patient reported colonoscopy was normal] : Patient reported colonoscopy was normal [Guns at Home] : no guns at home [MammogramDate] : 2018 [PapSmearDate] : 2018 [BoneDensityDate] : 2018 [ColonoscopyDate] : 01/14

## 2019-08-02 NOTE — ASSESSMENT
[FreeTextEntry1] : 1.health maintenance: EKG done today, follow up with GYN for Pap smear and mammogram repeat, follow up with colonoscopy and repeat exam, discussed blood work to get. Up-to-date with vaccinations.\par \par 2.chest wall deformity: Obtain chest x-ray to rule out mediastinal mass.\par \par 3.right ankle pain and swelling: Obtain right ankle x-ray. Continue with ice alternating with heat therapy and pain medication.\par \par 4.hypertension: Well controlled on atenolol.\par \par 5.chronic pain with degenerative disease: Follow up with pain management.\par \par 6.anxiety and depression: Xanax refilled today, Ambien refilled today.\par \par 7.history of breast CA with chemotherapy related nerve pain: Discussed tapering off Cymbalta one capsule every other day.\par \par

## 2019-08-02 NOTE — DATA REVIEWED
[FreeTextEntry1] : EKG: sinus bradycardia at 56 bpm, flattened T wave in lead II, T wave inversion in lead V2 unchanged from previous ECG.\par

## 2019-08-05 ENCOUNTER — TRANSCRIPTION ENCOUNTER (OUTPATIENT)
Age: 64
End: 2019-08-05

## 2019-08-12 ENCOUNTER — TRANSCRIPTION ENCOUNTER (OUTPATIENT)
Age: 64
End: 2019-08-12

## 2019-08-27 ENCOUNTER — MEDICATION RENEWAL (OUTPATIENT)
Age: 64
End: 2019-08-27

## 2019-09-04 DIAGNOSIS — F90.0 ADHD (ATTENTION DEFICIT HYPERACTIVITY DISORDER), INATTENTIVE TYPE: ICD-10-CM

## 2019-09-04 RX ORDER — DEXTROAMPHETAMINE SACCHARATE, AMPHETAMINE ASPARTATE, DEXTROAMPHETAMINE SULFATE AND AMPHETAMINE SULFATE 3.75; 3.75; 3.75; 3.75 MG/1; MG/1; MG/1; MG/1
15 TABLET ORAL 3 TIMES DAILY
Qty: 90 TAB | Refills: 0 | Status: SHIPPED | OUTPATIENT
Start: 2019-09-08 | End: 2019-09-16 | Stop reason: SDUPTHER

## 2019-09-16 ENCOUNTER — OFFICE VISIT (OUTPATIENT)
Dept: BEHAVIORAL HEALTH | Facility: CLINIC | Age: 64
End: 2019-09-16
Payer: MEDICARE

## 2019-09-16 VITALS
WEIGHT: 205 LBS | HEIGHT: 64 IN | BODY MASS INDEX: 35 KG/M2 | DIASTOLIC BLOOD PRESSURE: 93 MMHG | SYSTOLIC BLOOD PRESSURE: 143 MMHG | HEART RATE: 102 BPM

## 2019-09-16 DIAGNOSIS — R53.82 CHRONIC FATIGUE: ICD-10-CM

## 2019-09-16 DIAGNOSIS — F33.0 MDD (MAJOR DEPRESSIVE DISORDER), RECURRENT EPISODE, MILD (HCC): ICD-10-CM

## 2019-09-16 DIAGNOSIS — F90.0 ADHD (ATTENTION DEFICIT HYPERACTIVITY DISORDER), INATTENTIVE TYPE: ICD-10-CM

## 2019-09-16 PROCEDURE — 99214 OFFICE O/P EST MOD 30 MIN: CPT | Performed by: PSYCHIATRY & NEUROLOGY

## 2019-09-16 RX ORDER — DEXTROAMPHETAMINE SACCHARATE, AMPHETAMINE ASPARTATE, DEXTROAMPHETAMINE SULFATE AND AMPHETAMINE SULFATE 3.75; 3.75; 3.75; 3.75 MG/1; MG/1; MG/1; MG/1
15 TABLET ORAL 3 TIMES DAILY
Qty: 90 TAB | Refills: 0 | Status: SHIPPED | OUTPATIENT
Start: 2019-11-05 | End: 2019-12-05

## 2019-09-16 RX ORDER — VENLAFAXINE HYDROCHLORIDE 75 MG/1
225 CAPSULE, EXTENDED RELEASE ORAL DAILY
Qty: 270 CAP | Refills: 0 | Status: SHIPPED | OUTPATIENT
Start: 2019-09-16 | End: 2019-12-16

## 2019-09-16 RX ORDER — DEXTROAMPHETAMINE SACCHARATE, AMPHETAMINE ASPARTATE, DEXTROAMPHETAMINE SULFATE AND AMPHETAMINE SULFATE 3.75; 3.75; 3.75; 3.75 MG/1; MG/1; MG/1; MG/1
15 TABLET ORAL 3 TIMES DAILY
Qty: 90 TAB | Refills: 0 | Status: SHIPPED | OUTPATIENT
Start: 2019-12-05 | End: 2020-01-04

## 2019-09-16 RX ORDER — VENLAFAXINE HYDROCHLORIDE 150 MG/1
150 CAPSULE, EXTENDED RELEASE ORAL DAILY
Qty: 90 CAP | Refills: 0 | Status: SHIPPED | OUTPATIENT
Start: 2019-09-16 | End: 2019-09-16

## 2019-09-16 RX ORDER — DEXTROAMPHETAMINE SACCHARATE, AMPHETAMINE ASPARTATE, DEXTROAMPHETAMINE SULFATE AND AMPHETAMINE SULFATE 3.75; 3.75; 3.75; 3.75 MG/1; MG/1; MG/1; MG/1
15 TABLET ORAL 3 TIMES DAILY
Qty: 90 TAB | Refills: 0 | Status: SHIPPED | OUTPATIENT
Start: 2019-10-07 | End: 2019-11-06

## 2019-09-16 ASSESSMENT — PATIENT HEALTH QUESTIONNAIRE - PHQ9: CLINICAL INTERPRETATION OF PHQ2 SCORE: 1

## 2019-09-16 NOTE — PROGRESS NOTES
"PSYCHIATRY FOLLOW-UP NOTE      Chief Complaint   Patient presents with   • Follow-Up     depression, ADHD         History Of Present Illness:  Alseia Cesar is a 63 y.o. old female with major depressive disorder, ADHD, dyslipidemia, fibromyalgia, hypothyroidism, diabetes mellitus type 2, essential tremor comes in today for follow up, was last seen 3 months ago.  She has been struggling with some depression on and off since her last visit with me.  She is still missing her cat who  a few months ago.  She calls her sister as \"the matriarch of the family\" and she is not allowing her to get another pet at this time.  She does not drive and is dependent on her sister for transportation and she does not want to take more trips for a new pet.  She is disappointed with her sister's decision which is adding to her depression.  She is also worried about the same sister who is struggling with Parkinson's and is living in a facility.  She followed up with neurology and was diagnosed with essential tremor and was trialed on propranolol which did not help and she is no longer taking it.  She is happy that it is not Parkinson's and is relieved.  Sleep and appetite have been good.  She still struggling with fatigue from fibromyalgia and feels that the Adderall does help with both energy and concentration.  She denies having thoughts of wanting to hurt herself or others.    Social History:   She is currently single, has been  and  ×1 and has no kids.  She lives alone in [place].  She has 3 sisters and a brother and is close with all of them.  She is on disability for her psychiatric illness.    Substance Use:  Alcohol - Denies  Nicotine - Smokes 1 PPD  Illicit drugs - She denies using any cannabis in the last month    Past Medication Trials:  Prozac, Paxil, Zoloft, Celexa, Lexapro, Wellbutrin, Cymbalta, Adderall XR, Propranolol     Medications:  Current Outpatient Medications   Medication Sig Dispense Refill   • " "Cholecalciferol (VITAMIN D PO) Take  by mouth.     • [START ON 10/7/2019] amphetamine-dextroamphetamine (ADDERALL) 15 MG tablet Take 1 Tab by mouth 3 times a day for 30 days. 90 Tab 0   • [START ON 12/5/2019] amphetamine-dextroamphetamine (ADDERALL) 15 MG tablet Take 1 Tab by mouth 3 times a day for 30 days. 90 Tab 0   • [START ON 11/5/2019] amphetamine-dextroamphetamine (ADDERALL) 15 MG tablet Take 1 Tab by mouth 3 times a day for 30 days. 90 Tab 0   • venlafaxine (EFFEXOR-XR) 150 MG extended-release capsule Take 1 Cap by mouth every day. 90 Cap 0   • venlafaxine XR (EFFEXOR XR) 75 MG CAPSULE SR 24 HR Take 3 Caps by mouth every day. 270 Cap 0   • ARIPiprazole (ABILIFY) 10 MG Tab Take 1 Tab by mouth every day. 90 Tab 0   • metFORMIN (GLUCOPHAGE) 850 MG Tab Take 850 mg by mouth 2 Times a Day.     • levothyroxine (SYNTHROID) 88 MCG Tab Take 88 mcg by mouth every morning.     • atorvastatin (LIPITOR) 20 MG Tab Take 20 mg by mouth every day.     • triamcinolone acetonide (KENALOG) 0.1 % Cream      • clotrimazole (LOTRIMIN) 1 % Cream      • acyclovir (ZOVIRAX) 5 % Ointment every day.     • fluticasone (FLONASE) 50 MCG/ACT nasal spray      • montelukast (SINGULAIR) 10 MG Tab Take 10 mg by mouth every day.     • naproxen (NAPROSYN) 500 MG Tab Take 500 mg by mouth 2 Times a Day.       No current facility-administered medications for this visit.        Review Of Systems:    Constitutional - Positive for fatigue  Respiratory - Negative for shortness of breath, cough  CVS - Negative for chest pain, palpitations  GI - Negative for nausea, vomiting, abdominal pain, diarrhea, constipation  Musculoskeletal - Negative for back pain  Neurological - Negative for headaches. Positive for upper extremity tremor (right > left)  Psychiatric - Positive for impaired concentration, depression    Physical Examination:  Vital signs: /93   Pulse (!) 102   Ht 1.626 m (5' 4\")   Wt 93 kg (205 lb)   BMI 35.19 kg/m²     Musculoskeletal: " "Normal gait. No abnormal movements.     Mental Status Evaluation:   General: Middle aged white female, dressed in casual attire, good grooming and hygiene, in no apparent distress, calm and cooperative, good eye contact, no psychomotor agitation or retardation  Orientation: Alert and oriented to person, place and time  Recent and remote memory: Grossly intact  Attention span and concentration: Grossly intact  Speech: Spontaneous, normal rate, rhythm and tone  Thought Process: Linear, logical and goal directed  Thought Content: Denies suicidal or homicidal ideations, intent or plan  Perception: Denies auditory or visual hallucinations. No delusions noted  Associations: Intact  Language: Appropriate  Fund of knowledge and vocabulary: Grossly adequate  Mood: \"am okay\"  Affect: Euthymic, mood congruent  Insight: Good  Judgment: Good    Depression screening:  Depression Screen (PHQ-2/PHQ-9) 8/21/2018 6/12/2019 9/16/2019   PHQ-2 Total Score 0 1 1     Interpretation of PHQ-9 Total Score   Score Severity   1-4 No Depression   5-9 Mild Depression   10-14 Moderate Depression   15-19 Moderately Severe Depression   20-27 Severe Depression    Medical Records/Labs/Diagnostic Tests Reviewed:  [facility] records -appropriate refills, no abuse suspected  Lipid profile with elevated triglycerides, and A1c elevated at 6.1 (6/2019)      Impression:  1.  ADHD, inattentive type - stable  2.  Major depressive disorder, recurrent, mild - slight worsening   3.  Chronic fatigue secondary to fibromyalgia and depression - stable    Plan:  1.  Continue Adderall 15 mg 3 times daily for ADHD.  Provided her with 3 prescriptions for 90 tabs each.   2.  Increase Effexor XR to 225 mg at bedtime for depression  3.  Continue Abilify 10 mg at bedtime for depression augmentation  - AIMS 0 (3/2019)  - Metabolic monitoring (6/2019): Lipid profile with elevated triglycerides, A1c elevated at 6.1  - Yearly metabolic monitoring labs due in 6/2020    Return to " clinic in 3-4 months or sooner if symptoms worsen    The proposed treatment plan was discussed with the patient who was provided the opportunity to ask questions and make suggestions regarding alternative treatment. Patient verbalized understanding and expressed agreement with the plan.     Argentina Brunson M.D.  09/16/19    This note was created using voice recognition software (Dragon). The accuracy of the dictation is limited by the abilities of the software. I have reviewed the note prior to signing, however some errors in grammar and context are still possible. If you have any questions related to this note please do not hesitate to contact our office.

## 2019-09-18 ENCOUNTER — MEDICATION RENEWAL (OUTPATIENT)
Age: 64
End: 2019-09-18

## 2019-09-20 ENCOUNTER — TRANSCRIPTION ENCOUNTER (OUTPATIENT)
Age: 64
End: 2019-09-20

## 2019-12-16 DIAGNOSIS — F33.0 MDD (MAJOR DEPRESSIVE DISORDER), RECURRENT EPISODE, MILD (HCC): ICD-10-CM

## 2019-12-16 RX ORDER — VENLAFAXINE HYDROCHLORIDE 75 MG/1
CAPSULE, EXTENDED RELEASE ORAL
Qty: 270 CAP | Refills: 0 | Status: SHIPPED | OUTPATIENT
Start: 2019-12-16 | End: 2020-01-02 | Stop reason: SDUPTHER

## 2019-12-16 RX ORDER — ARIPIPRAZOLE 10 MG/1
TABLET ORAL
Qty: 90 TAB | Refills: 0 | Status: SHIPPED | OUTPATIENT
Start: 2019-12-16 | End: 2020-03-23 | Stop reason: SDUPTHER

## 2020-01-02 ENCOUNTER — OFFICE VISIT (OUTPATIENT)
Dept: BEHAVIORAL HEALTH | Facility: CLINIC | Age: 65
End: 2020-01-02
Payer: MEDICARE

## 2020-01-02 VITALS
WEIGHT: 211 LBS | BODY MASS INDEX: 36.02 KG/M2 | SYSTOLIC BLOOD PRESSURE: 93 MMHG | HEIGHT: 64 IN | HEART RATE: 105 BPM | DIASTOLIC BLOOD PRESSURE: 73 MMHG

## 2020-01-02 DIAGNOSIS — F90.0 ADHD (ATTENTION DEFICIT HYPERACTIVITY DISORDER), INATTENTIVE TYPE: ICD-10-CM

## 2020-01-02 DIAGNOSIS — F33.0 MDD (MAJOR DEPRESSIVE DISORDER), RECURRENT EPISODE, MILD (HCC): ICD-10-CM

## 2020-01-02 DIAGNOSIS — F33.42 MDD (MAJOR DEPRESSIVE DISORDER), RECURRENT, IN FULL REMISSION (HCC): ICD-10-CM

## 2020-01-02 DIAGNOSIS — R53.82 CHRONIC FATIGUE: ICD-10-CM

## 2020-01-02 PROCEDURE — 99214 OFFICE O/P EST MOD 30 MIN: CPT | Performed by: PSYCHIATRY & NEUROLOGY

## 2020-01-02 RX ORDER — DEXTROAMPHETAMINE SACCHARATE, AMPHETAMINE ASPARTATE, DEXTROAMPHETAMINE SULFATE AND AMPHETAMINE SULFATE 3.75; 3.75; 3.75; 3.75 MG/1; MG/1; MG/1; MG/1
15 TABLET ORAL 3 TIMES DAILY
Qty: 90 TAB | Refills: 0 | Status: SHIPPED | OUTPATIENT
Start: 2020-03-01 | End: 2020-03-31

## 2020-01-02 RX ORDER — DEXTROAMPHETAMINE SACCHARATE, AMPHETAMINE ASPARTATE, DEXTROAMPHETAMINE SULFATE AND AMPHETAMINE SULFATE 3.75; 3.75; 3.75; 3.75 MG/1; MG/1; MG/1; MG/1
15 TABLET ORAL 3 TIMES DAILY
Qty: 90 TAB | Refills: 0 | Status: SHIPPED | OUTPATIENT
Start: 2020-01-03 | End: 2020-02-02

## 2020-01-02 RX ORDER — VENLAFAXINE HYDROCHLORIDE 75 MG/1
225 CAPSULE, EXTENDED RELEASE ORAL
Qty: 270 CAP | Refills: 0 | Status: SHIPPED | OUTPATIENT
Start: 2020-01-02 | End: 2020-03-23 | Stop reason: SDUPTHER

## 2020-01-02 RX ORDER — DEXTROAMPHETAMINE SACCHARATE, AMPHETAMINE ASPARTATE, DEXTROAMPHETAMINE SULFATE AND AMPHETAMINE SULFATE 3.75; 3.75; 3.75; 3.75 MG/1; MG/1; MG/1; MG/1
15 TABLET ORAL 3 TIMES DAILY
Qty: 90 TAB | Refills: 0 | Status: SHIPPED | OUTPATIENT
Start: 2020-02-01 | End: 2020-03-02

## 2020-01-02 ASSESSMENT — PATIENT HEALTH QUESTIONNAIRE - PHQ9: CLINICAL INTERPRETATION OF PHQ2 SCORE: 0

## 2020-01-02 NOTE — PROGRESS NOTES
PSYCHIATRY FOLLOW-UP NOTE      Chief Complaint   Patient presents with   • Follow-Up     depression, ADHD         History Of Present Illness:  Alesia Cesar is a 64 y.o. old female with major depressive disorder, ADHD, dyslipidemia, fibromyalgia, hypothyroidism, diabetes mellitus type 2, essential tremor comes in today for follow up, was last seen 3 months ago.  She has been feeling better in regards to her depression since her last visit with me.  She was able to increase her dose of Effexor to 225 mg and noticed a benefit and did not notice any side effects.  She is still missing her pet cat and is trying to talk to her older sister to see if she can get another pet cat.  She feels that she is lonely at home and in the animal gives her company but is having a hard time convincing her sister.  She denies any new psychosocial or relationship stressors.  She endorses benefit on her focus and concentration with Adderall and denies any side effects.  She denies any problems with her sleep or appetite.  She denies any impulsive behaviors.  She denies having thoughts of wanting to hurt herself or others.    Social History:   She is currently single, has been  and  ×1 and has no kids.  She lives alone in [place].  She has 3 sisters and a brother and is close with all of them.  She is on disability for her psychiatric illness.    Substance Use:  Alcohol - Denies  Nicotine - Smokes 1 PPD  Illicit drugs - She denies using any cannabis in the last month    Past Medication Trials:  Prozac, Paxil, Zoloft, Celexa, Lexapro, Wellbutrin, Cymbalta, Adderall XR, Propranolol     Medications:  Current Outpatient Medications   Medication Sig Dispense Refill   • ARIPiprazole (ABILIFY) 10 MG Tab TAKE ONE TABLET BY MOUTH EVERY DAY 90 Tab 0   • venlafaxine XR (EFFEXOR XR) 75 MG CAPSULE SR 24 HR TAKE THREE CAPSULES BY MOUTH EVERY  Cap 0   • amphetamine-dextroamphetamine (ADDERALL) 15 MG tablet Take 1 Tab by mouth 3 times a  "day for 30 days. 90 Tab 0   • metFORMIN (GLUCOPHAGE) 850 MG Tab Take 850 mg by mouth 2 Times a Day.     • levothyroxine (SYNTHROID) 88 MCG Tab Take 88 mcg by mouth every morning.     • atorvastatin (LIPITOR) 20 MG Tab Take 20 mg by mouth every day.     • fluticasone (FLONASE) 50 MCG/ACT nasal spray      • montelukast (SINGULAIR) 10 MG Tab Take 10 mg by mouth every day.     • naproxen (NAPROSYN) 500 MG Tab Take 500 mg by mouth 2 Times a Day.       No current facility-administered medications for this visit.        Review Of Systems:    Constitutional - Positive for fatigue  Respiratory - Negative for shortness of breath, cough  CVS - Negative for chest pain, palpitations  GI - Negative for nausea, vomiting, abdominal pain, diarrhea, constipation  Musculoskeletal - Negative for back pain  Neurological - Negative for headaches. Positive for upper extremity tremor (right > left)  Psychiatric - Negative for depression, poor sleep    Physical Examination:  Vital signs: Pulse (!) 105   Ht 1.626 m (5' 4\")   Wt 95.7 kg (211 lb)   BMI 36.22 kg/m²     Musculoskeletal: Normal gait. No abnormal movements.     Mental Status Evaluation:   General: Middle aged white female, dressed in casual attire, good grooming and hygiene, in no apparent distress, calm and cooperative, good eye contact, no psychomotor agitation or retardation  Orientation: Alert and oriented to person, place and time  Recent and remote memory: Grossly intact  Attention span and concentration: Grossly intact  Speech: Spontaneous, normal rate, rhythm and tone  Thought Process: Linear, logical and goal directed  Thought Content: Denies suicidal or homicidal ideations, intent or plan  Perception: Denies auditory or visual hallucinations. No delusions noted  Associations: Intact  Language: Appropriate  Fund of knowledge and vocabulary: Grossly adequate  Mood: \"am okay\"  Affect: Euthymic, mood congruent  Insight: Good  Judgment: Good    Depression " screening:  Depression Screen (PHQ-2/PHQ-9) 8/21/2018 6/12/2019 9/16/2019   PHQ-2 Total Score 0 1 1     Interpretation of PHQ-9 Total Score   Score Severity   1-4 No Depression   5-9 Mild Depression   10-14 Moderate Depression   15-19 Moderately Severe Depression   20-27 Severe Depression    Medical Records/Labs/Diagnostic Tests Reviewed:  [place]  records -appropriate refills, no abuse suspected      Impression:  1.  ADHD, inattentive type - stable  2.  Major depressive disorder, recurrent, in full remission - improving  3.  Chronic fatigue secondary to fibromyalgia and depression - stable    Plan:  1.  Continue Adderall 15 mg 3 times daily for ADHD.  Provided her with 3 prescriptions for 90 tabs each.   2.  Continue Effexor  mg at bedtime for depression.  If she is stable in regards to her depression will decrease dose of Effexor down to 150 mg at her follow-up appointment.  Her depression lately has been more related to losing her pet cat which is slowly getting better.  3.  Continue Abilify 10 mg at bedtime for depression augmentation  - AIMS 0 (3/2019)  - Metabolic monitoring (6/2019): Lipid profile with elevated triglycerides, A1c elevated at 6.1  - Yearly metabolic monitoring labs due in 6/2020    Return to clinic in 3 months or sooner if symptoms worsen    The proposed treatment plan was discussed with the patient who was provided the opportunity to ask questions and make suggestions regarding alternative treatment. Patient verbalized understanding and expressed agreement with the plan.     Argentina Brunson M.D.  01/02/20    This note was created using voice recognition software (Dragon). The accuracy of the dictation is limited by the abilities of the software. I have reviewed the note prior to signing, however some errors in grammar and context are still possible. If you have any questions related to this note please do not hesitate to contact our office.

## 2020-02-15 ENCOUNTER — TRANSCRIPTION ENCOUNTER (OUTPATIENT)
Age: 65
End: 2020-02-15

## 2020-03-14 ENCOUNTER — HOSPITAL ENCOUNTER (OUTPATIENT)
Dept: LAB | Facility: MEDICAL CENTER | Age: 65
End: 2020-03-14
Attending: FAMILY MEDICINE
Payer: MEDICARE

## 2020-03-14 LAB
25(OH)D3 SERPL-MCNC: 41 NG/ML (ref 30–100)
ALBUMIN SERPL BCP-MCNC: 4.2 G/DL (ref 3.2–4.9)
ALBUMIN/GLOB SERPL: 1.1 G/DL
ALP SERPL-CCNC: 106 U/L (ref 30–99)
ALT SERPL-CCNC: 28 U/L (ref 2–50)
ANION GAP SERPL CALC-SCNC: 12 MMOL/L (ref 7–16)
AST SERPL-CCNC: 22 U/L (ref 12–45)
BASOPHILS # BLD AUTO: 0.5 % (ref 0–1.8)
BASOPHILS # BLD: 0.07 K/UL (ref 0–0.12)
BILIRUB SERPL-MCNC: 0.5 MG/DL (ref 0.1–1.5)
BUN SERPL-MCNC: 8 MG/DL (ref 8–22)
CALCIUM SERPL-MCNC: 10 MG/DL (ref 8.5–10.5)
CHLORIDE SERPL-SCNC: 102 MMOL/L (ref 96–112)
CHOLEST SERPL-MCNC: 129 MG/DL (ref 100–199)
CO2 SERPL-SCNC: 24 MMOL/L (ref 20–33)
CREAT SERPL-MCNC: 0.87 MG/DL (ref 0.5–1.4)
EOSINOPHIL # BLD AUTO: 0.15 K/UL (ref 0–0.51)
EOSINOPHIL NFR BLD: 1 % (ref 0–6.9)
ERYTHROCYTE [DISTWIDTH] IN BLOOD BY AUTOMATED COUNT: 48.4 FL (ref 35.9–50)
EST. AVERAGE GLUCOSE BLD GHB EST-MCNC: 123 MG/DL
FASTING STATUS PATIENT QL REPORTED: NORMAL
GLOBULIN SER CALC-MCNC: 3.8 G/DL (ref 1.9–3.5)
GLUCOSE SERPL-MCNC: 129 MG/DL (ref 65–99)
HBA1C MFR BLD: 5.9 % (ref 0–5.6)
HCT VFR BLD AUTO: 46 % (ref 37–47)
HDLC SERPL-MCNC: 32 MG/DL
HGB BLD-MCNC: 15.2 G/DL (ref 12–16)
IMM GRANULOCYTES # BLD AUTO: 0.09 K/UL (ref 0–0.11)
IMM GRANULOCYTES NFR BLD AUTO: 0.6 % (ref 0–0.9)
LDLC SERPL CALC-MCNC: 69 MG/DL
LYMPHOCYTES # BLD AUTO: 2.93 K/UL (ref 1–4.8)
LYMPHOCYTES NFR BLD: 19.3 % (ref 22–41)
MCH RBC QN AUTO: 29 PG (ref 27–33)
MCHC RBC AUTO-ENTMCNC: 33 G/DL (ref 33.6–35)
MCV RBC AUTO: 87.6 FL (ref 81.4–97.8)
MONOCYTES # BLD AUTO: 0.66 K/UL (ref 0–0.85)
MONOCYTES NFR BLD AUTO: 4.4 % (ref 0–13.4)
NEUTROPHILS # BLD AUTO: 11.26 K/UL (ref 2–7.15)
NEUTROPHILS NFR BLD: 74.2 % (ref 44–72)
NRBC # BLD AUTO: 0 K/UL
NRBC BLD-RTO: 0 /100 WBC
PLATELET # BLD AUTO: 471 K/UL (ref 164–446)
PMV BLD AUTO: 10.4 FL (ref 9–12.9)
POTASSIUM SERPL-SCNC: 4 MMOL/L (ref 3.6–5.5)
PROT SERPL-MCNC: 8 G/DL (ref 6–8.2)
RBC # BLD AUTO: 5.25 M/UL (ref 4.2–5.4)
SODIUM SERPL-SCNC: 138 MMOL/L (ref 135–145)
T4 FREE SERPL-MCNC: 1.24 NG/DL (ref 0.53–1.43)
TRIGL SERPL-MCNC: 142 MG/DL (ref 0–149)
TSH SERPL DL<=0.005 MIU/L-ACNC: 1.98 UIU/ML (ref 0.38–5.33)
WBC # BLD AUTO: 15.2 K/UL (ref 4.8–10.8)

## 2020-03-14 PROCEDURE — 80053 COMPREHEN METABOLIC PANEL: CPT

## 2020-03-14 PROCEDURE — 36415 COLL VENOUS BLD VENIPUNCTURE: CPT

## 2020-03-14 PROCEDURE — 84443 ASSAY THYROID STIM HORMONE: CPT

## 2020-03-14 PROCEDURE — 82306 VITAMIN D 25 HYDROXY: CPT

## 2020-03-14 PROCEDURE — 83036 HEMOGLOBIN GLYCOSYLATED A1C: CPT | Mod: GA

## 2020-03-14 PROCEDURE — 85025 COMPLETE CBC W/AUTO DIFF WBC: CPT

## 2020-03-14 PROCEDURE — 80061 LIPID PANEL: CPT

## 2020-03-14 PROCEDURE — 84439 ASSAY OF FREE THYROXINE: CPT

## 2020-03-16 ENCOUNTER — HOSPITAL ENCOUNTER (OUTPATIENT)
Facility: MEDICAL CENTER | Age: 65
End: 2020-03-16
Attending: FAMILY MEDICINE
Payer: MEDICARE

## 2020-03-16 LAB
CREAT UR-MCNC: 72.6 MG/DL
MICROALBUMIN UR-MCNC: <0.7 MG/DL
MICROALBUMIN/CREAT UR: NORMAL MG/G (ref 0–30)

## 2020-03-16 PROCEDURE — 82043 UR ALBUMIN QUANTITATIVE: CPT

## 2020-03-16 PROCEDURE — 82570 ASSAY OF URINE CREATININE: CPT

## 2020-03-21 ENCOUNTER — HOSPITAL ENCOUNTER (OUTPATIENT)
Dept: LAB | Facility: MEDICAL CENTER | Age: 65
End: 2020-03-21
Attending: FAMILY MEDICINE
Payer: MEDICARE

## 2020-03-21 LAB
BASOPHILS # BLD AUTO: 0.4 % (ref 0–1.8)
BASOPHILS # BLD: 0.05 K/UL (ref 0–0.12)
EOSINOPHIL # BLD AUTO: 0.32 K/UL (ref 0–0.51)
EOSINOPHIL NFR BLD: 2.8 % (ref 0–6.9)
ERYTHROCYTE [DISTWIDTH] IN BLOOD BY AUTOMATED COUNT: 47.8 FL (ref 35.9–50)
HCT VFR BLD AUTO: 44.5 % (ref 37–47)
HGB BLD-MCNC: 14.6 G/DL (ref 12–16)
IMM GRANULOCYTES # BLD AUTO: 0.07 K/UL (ref 0–0.11)
IMM GRANULOCYTES NFR BLD AUTO: 0.6 % (ref 0–0.9)
LYMPHOCYTES # BLD AUTO: 4.01 K/UL (ref 1–4.8)
LYMPHOCYTES NFR BLD: 34.8 % (ref 22–41)
MCH RBC QN AUTO: 29 PG (ref 27–33)
MCHC RBC AUTO-ENTMCNC: 32.8 G/DL (ref 33.6–35)
MCV RBC AUTO: 88.3 FL (ref 81.4–97.8)
MONOCYTES # BLD AUTO: 0.42 K/UL (ref 0–0.85)
MONOCYTES NFR BLD AUTO: 3.6 % (ref 0–13.4)
NEUTROPHILS # BLD AUTO: 6.66 K/UL (ref 2–7.15)
NEUTROPHILS NFR BLD: 57.8 % (ref 44–72)
NRBC # BLD AUTO: 0 K/UL
NRBC BLD-RTO: 0 /100 WBC
PLATELET # BLD AUTO: 472 K/UL (ref 164–446)
PMV BLD AUTO: 9.9 FL (ref 9–12.9)
RBC # BLD AUTO: 5.04 M/UL (ref 4.2–5.4)
WBC # BLD AUTO: 11.5 K/UL (ref 4.8–10.8)

## 2020-03-21 PROCEDURE — 85025 COMPLETE CBC W/AUTO DIFF WBC: CPT

## 2020-03-21 PROCEDURE — 36415 COLL VENOUS BLD VENIPUNCTURE: CPT

## 2020-03-23 ENCOUNTER — OFFICE VISIT (OUTPATIENT)
Dept: BEHAVIORAL HEALTH | Facility: CLINIC | Age: 65
End: 2020-03-23
Payer: MEDICARE

## 2020-03-23 VITALS
SYSTOLIC BLOOD PRESSURE: 106 MMHG | HEIGHT: 64 IN | WEIGHT: 207 LBS | DIASTOLIC BLOOD PRESSURE: 95 MMHG | BODY MASS INDEX: 35.34 KG/M2 | HEART RATE: 102 BPM

## 2020-03-23 DIAGNOSIS — F90.0 ADHD (ATTENTION DEFICIT HYPERACTIVITY DISORDER), INATTENTIVE TYPE: ICD-10-CM

## 2020-03-23 DIAGNOSIS — F33.42 MDD (MAJOR DEPRESSIVE DISORDER), RECURRENT, IN FULL REMISSION (HCC): ICD-10-CM

## 2020-03-23 DIAGNOSIS — R53.82 CHRONIC FATIGUE: ICD-10-CM

## 2020-03-23 PROCEDURE — 96127 BRIEF EMOTIONAL/BEHAV ASSMT: CPT | Performed by: PSYCHIATRY & NEUROLOGY

## 2020-03-23 PROCEDURE — 99214 OFFICE O/P EST MOD 30 MIN: CPT | Performed by: PSYCHIATRY & NEUROLOGY

## 2020-03-23 PROCEDURE — 99999 PR NO CHARGE: CPT | Performed by: PSYCHIATRY & NEUROLOGY

## 2020-03-23 RX ORDER — ARIPIPRAZOLE 10 MG/1
10 TABLET ORAL
Qty: 90 TAB | Refills: 0 | Status: SHIPPED | OUTPATIENT
Start: 2020-03-23 | End: 2020-06-25 | Stop reason: SDUPTHER

## 2020-03-23 RX ORDER — DEXTROAMPHETAMINE SACCHARATE, AMPHETAMINE ASPARTATE, DEXTROAMPHETAMINE SULFATE AND AMPHETAMINE SULFATE 3.75; 3.75; 3.75; 3.75 MG/1; MG/1; MG/1; MG/1
15 TABLET ORAL 3 TIMES DAILY
Qty: 90 TAB | Refills: 0 | Status: SHIPPED | OUTPATIENT
Start: 2020-03-30 | End: 2020-04-29

## 2020-03-23 RX ORDER — DEXTROAMPHETAMINE SACCHARATE, AMPHETAMINE ASPARTATE, DEXTROAMPHETAMINE SULFATE AND AMPHETAMINE SULFATE 3.75; 3.75; 3.75; 3.75 MG/1; MG/1; MG/1; MG/1
15 TABLET ORAL 3 TIMES DAILY
Qty: 90 TAB | Refills: 0 | Status: SHIPPED | OUTPATIENT
Start: 2020-05-27 | End: 2020-06-25

## 2020-03-23 RX ORDER — VENLAFAXINE HYDROCHLORIDE 75 MG/1
225 CAPSULE, EXTENDED RELEASE ORAL
Qty: 270 CAP | Refills: 0 | Status: SHIPPED | OUTPATIENT
Start: 2020-03-23 | End: 2020-06-25 | Stop reason: SDUPTHER

## 2020-03-23 RX ORDER — DEXTROAMPHETAMINE SACCHARATE, AMPHETAMINE ASPARTATE, DEXTROAMPHETAMINE SULFATE AND AMPHETAMINE SULFATE 3.75; 3.75; 3.75; 3.75 MG/1; MG/1; MG/1; MG/1
15 TABLET ORAL 3 TIMES DAILY
Qty: 90 TAB | Refills: 0 | Status: SHIPPED | OUTPATIENT
Start: 2020-04-28 | End: 2020-05-28

## 2020-03-23 ASSESSMENT — ABNORMAL INVOLUNTARY MOVEMENT SCALE (AIMS)
JAW: NONE, NORMAL
LOWER_BODY_EXTREMITIES: NONE, NORMAL
LIPS_PARIETAL: NONE, NORMAL
AIMS_PATIENT_INCAPACITATION: NONE, NORMAL
AIMS_SEVERITY: 0
AIMS_PATIENT_AWARENESS: NO AWARENESS
NECK_SHOULDER_HIPS: NONE, NORMAL
FACIAL_EXPRESSION_MUSCLES: NONE, NORMAL
PATIENT_WEARS_DENTURES: NO
CURRENT_PROBLEMS_TEETH_DENTURES: NO
TONGUE: NONE, NORMAL
UPPER_BODY_EXTREMITIES: NONE, NORMAL

## 2020-03-23 ASSESSMENT — PATIENT HEALTH QUESTIONNAIRE - PHQ9
9. THOUGHTS THAT YOU WOULD BE BETTER OFF DEAD, OR OF HURTING YOURSELF: NOT AT ALL
3. TROUBLE FALLING OR STAYING ASLEEP OR SLEEPING TOO MUCH: NOT AT ALL
4. FEELING TIRED OR HAVING LITTLE ENERGY: NEARLY EVERY DAY
SUM OF ALL RESPONSES TO PHQ9 QUESTIONS 1 AND 2: 1
5. POOR APPETITE OR OVEREATING: NOT AT ALL
SUM OF ALL RESPONSES TO PHQ QUESTIONS 1-9: 4
2. FEELING DOWN, DEPRESSED, IRRITABLE, OR HOPELESS: SEVERAL DAYS
8. MOVING OR SPEAKING SO SLOWLY THAT OTHER PEOPLE COULD HAVE NOTICED. OR THE OPPOSITE, BEING SO FIGETY OR RESTLESS THAT YOU HAVE BEEN MOVING AROUND A LOT MORE THAN USUAL: NOT AT ALL
7. TROUBLE CONCENTRATING ON THINGS, SUCH AS READING THE NEWSPAPER OR WATCHING TELEVISION: NOT AT ALL
6. FEELING BAD ABOUT YOURSELF - OR THAT YOU ARE A FAILURE OR HAVE LET YOURSELF OR YOUR FAMILY DOWN: NOT AL ALL
1. LITTLE INTEREST OR PLEASURE IN DOING THINGS: NOT AT ALL

## 2020-03-23 ASSESSMENT — FIBROSIS 4 INDEX: FIB4 SCORE: 0.55

## 2020-03-23 NOTE — PROGRESS NOTES
PSYCHIATRY FOLLOW-UP NOTE      Chief Complaint   Patient presents with   • Follow-Up     depression, ADHD         History Of Present Illness:  Alesia Cesar is a 64 y.o. old female with major depressive disorder, ADHD, dyslipidemia, fibromyalgia, hypothyroidism, diabetes mellitus type 2, essential tremor comes in today for follow up, was last seen 3 months ago.  She is doing well in regards to her depression and ADHD.  She reports some situational sadness as she recently found out that an ex-boyfriend of her has been dealing with stage IV lung cancer.  She is also worried about her sister struggles with her memory and is currently living in a nursing home but because of the coronavirus restrictions she is not able to see her on a regular basis.  She is endorsing some situational anxiety with this as well.  She is doing all right in regards to her focus and concentration with Adderall and denies any side effects.  She continues to struggle with fatigue which she feels is a daily issue for her.  Sleep and appetite have been okay.  She denies any unprovoked crying spells.  She denies having thoughts of wanting to hurt herself or others.    Social History:   She is currently single, has been  and  ×1 and has no kids.  She lives alone in [place].  She has 3 sisters and a brother and is close with all of them.  She is on disability for her psychiatric illness.    Substance Use:  Alcohol - Denies  Nicotine - Smokes 1 PPD  Illicit drugs - Recreational cannabis use    Past Medication Trials:  Prozac, Paxil, Zoloft, Celexa, Lexapro, Wellbutrin, Cymbalta, Adderall XR, Propranolol     Medications:  Current Outpatient Medications   Medication Sig Dispense Refill   • [START ON 5/27/2020] amphetamine-dextroamphetamine (ADDERALL) 15 MG tablet Take 1 Tab by mouth 3 times a day for 30 days. 90 Tab 0   • [START ON 4/28/2020] amphetamine-dextroamphetamine (ADDERALL) 15 MG tablet Take 1 Tab by mouth 3 times a day for 30 days.  "90 Tab 0   • [START ON 3/30/2020] amphetamine-dextroamphetamine (ADDERALL) 15 MG tablet Take 1 Tab by mouth 3 times a day for 30 days. 90 Tab 0   • ARIPiprazole (ABILIFY) 10 MG Tab Take 1 Tab by mouth every day. 90 Tab 0   • venlafaxine XR (EFFEXOR XR) 75 MG CAPSULE SR 24 HR Take 3 Caps by mouth every day. 270 Cap 0   • amphetamine-dextroamphetamine (ADDERALL) 15 MG tablet Take 1 Tab by mouth 3 times a day for 30 days. 90 Tab 0   • metFORMIN (GLUCOPHAGE) 850 MG Tab Take 850 mg by mouth 2 Times a Day.     • levothyroxine (SYNTHROID) 88 MCG Tab Take 88 mcg by mouth every morning.     • atorvastatin (LIPITOR) 20 MG Tab Take 20 mg by mouth every day.     • fluticasone (FLONASE) 50 MCG/ACT nasal spray      • montelukast (SINGULAIR) 10 MG Tab Take 10 mg by mouth every day.     • naproxen (NAPROSYN) 500 MG Tab Take 500 mg by mouth 2 Times a Day.       No current facility-administered medications for this visit.        Review Of Systems:    Constitutional - Positive for fatigue  Respiratory - Negative for shortness of breath, cough  CVS - Negative for chest pain, palpitations  GI - Negative for nausea, vomiting, abdominal pain, diarrhea, constipation  Musculoskeletal - Negative for back pain  Neurological - Negative for headaches. Positive for upper extremity tremor (right > left)  Psychiatric - Negative for depression, poor sleep.  Positive for situational anxiety    Physical Examination:  Vital signs: /95   Pulse (!) 102   Ht 1.626 m (5' 4\")   Wt 93.9 kg (207 lb)   BMI 35.53 kg/m²     Musculoskeletal: Normal gait. No abnormal movements.     Mental Status Evaluation:   General: Middle aged white female, dressed in casual attire, good grooming and hygiene, in no apparent distress, calm and cooperative, good eye contact, no psychomotor agitation or retardation  Orientation: Alert and oriented to person, place and time  Recent and remote memory: Grossly intact  Attention span and concentration: Grossly " "intact  Speech: Spontaneous, normal rate, rhythm and tone  Thought Process: Linear, logical and goal directed  Thought Content: Denies suicidal or homicidal ideations, intent or plan  Perception: Denies auditory or visual hallucinations. No delusions noted  Associations: Intact  Language: Appropriate  Fund of knowledge and vocabulary: Grossly adequate  Mood: \"am okay\"  Affect: Euthymic, mood congruent  Insight: Good  Judgment: Good    Depression screening:  Depression Screen (PHQ-2/PHQ-9) 9/16/2019 1/2/2020 3/23/2020   PHQ-2 Total Score - - 1   PHQ-2 Total Score 1 0 -   PHQ-9 Total Score - - 4     Interpretation of PHQ-9 Total Score   Score Severity   1-4 No Depression   5-9 Mild Depression   10-14 Moderate Depression   15-19 Moderately Severe Depression   20-27 Severe Depression    Medical Records/Labs/Diagnostic Tests Reviewed:  [facility] records - appropriate refills, no abuse suspected      Impression:  1.  ADHD, inattentive type - stable  2.  Major depressive disorder, recurrent, in full remission - improving  3.  Chronic fatigue secondary to fibromyalgia and depression - stable    Plan:  1.  Continue Adderall 15 mg 3 times daily for ADHD.  Provided her with 3 prescriptions for 90 tabs each.   2.  Continue Effexor  mg at bedtime for depression.  She is having some situational anxiety over coronavirus and will continue her current dose of Effexor for now.  If she continues to do well will revisit tapering down the dose to 150 mg.  3.  Continue Abilify 10 mg at bedtime for depression augmentation  - AIMS 0 (today)  - Metabolic monitoring (6/2019): Lipid profile with elevated triglycerides, A1c elevated at 6.1  - Yearly metabolic monitoring labs due in 6/2020    Return to clinic in 3 months or sooner if symptoms worsen    The proposed treatment plan was discussed with the patient who was provided the opportunity to ask questions and make suggestions regarding alternative treatment. Patient verbalized " understanding and expressed agreement with the plan.     Argentina Brunson M.D.  03/23/20    This note was created using voice recognition software (Dragon). The accuracy of the dictation is limited by the abilities of the software. I have reviewed the note prior to signing, however some errors in grammar and context are still possible. If you have any questions related to this note please do not hesitate to contact our office.

## 2020-03-27 RX ORDER — ATORVASTATIN CALCIUM 20 MG/1
20 TABLET, FILM COATED ORAL DAILY
Qty: 90 | Refills: 1 | Status: ACTIVE | COMMUNITY
Start: 1900-01-01 | End: 1900-01-01

## 2020-06-25 ENCOUNTER — OFFICE VISIT (OUTPATIENT)
Dept: BEHAVIORAL HEALTH | Facility: CLINIC | Age: 65
End: 2020-06-25
Payer: MEDICARE

## 2020-06-25 VITALS — HEIGHT: 64 IN | BODY MASS INDEX: 35.53 KG/M2

## 2020-06-25 DIAGNOSIS — F33.42 MDD (MAJOR DEPRESSIVE DISORDER), RECURRENT, IN FULL REMISSION (HCC): ICD-10-CM

## 2020-06-25 DIAGNOSIS — R53.82 CHRONIC FATIGUE: ICD-10-CM

## 2020-06-25 DIAGNOSIS — F90.0 ADHD (ATTENTION DEFICIT HYPERACTIVITY DISORDER), INATTENTIVE TYPE: ICD-10-CM

## 2020-06-25 PROCEDURE — 99441 PR PHYSICIAN TELEPHONE EVALUATION 5-10 MIN: CPT | Performed by: PSYCHIATRY & NEUROLOGY

## 2020-06-25 RX ORDER — ARIPIPRAZOLE 10 MG/1
10 TABLET ORAL
Qty: 90 TAB | Refills: 0 | Status: SHIPPED | OUTPATIENT
Start: 2020-06-25 | End: 2020-07-29

## 2020-06-25 RX ORDER — VENLAFAXINE HYDROCHLORIDE 75 MG/1
225 CAPSULE, EXTENDED RELEASE ORAL
Qty: 270 CAP | Refills: 0 | Status: SHIPPED | OUTPATIENT
Start: 2020-06-25 | End: 2020-09-24

## 2020-06-25 RX ORDER — DEXTROAMPHETAMINE SACCHARATE, AMPHETAMINE ASPARTATE, DEXTROAMPHETAMINE SULFATE AND AMPHETAMINE SULFATE 3.75; 3.75; 3.75; 3.75 MG/1; MG/1; MG/1; MG/1
15 TABLET ORAL 3 TIMES DAILY
Qty: 90 TAB | Refills: 0 | Status: SHIPPED | OUTPATIENT
Start: 2020-08-22 | End: 2020-09-22 | Stop reason: SDUPTHER

## 2020-06-25 RX ORDER — DEXTROAMPHETAMINE SACCHARATE, AMPHETAMINE ASPARTATE, DEXTROAMPHETAMINE SULFATE AND AMPHETAMINE SULFATE 3.75; 3.75; 3.75; 3.75 MG/1; MG/1; MG/1; MG/1
15 TABLET ORAL 3 TIMES DAILY
Qty: 90 TAB | Refills: 0 | Status: SHIPPED | OUTPATIENT
Start: 2020-06-25 | End: 2020-07-25

## 2020-06-25 RX ORDER — DEXTROAMPHETAMINE SACCHARATE, AMPHETAMINE ASPARTATE, DEXTROAMPHETAMINE SULFATE AND AMPHETAMINE SULFATE 3.75; 3.75; 3.75; 3.75 MG/1; MG/1; MG/1; MG/1
15 TABLET ORAL 3 TIMES DAILY
Qty: 90 TAB | Refills: 0 | Status: SHIPPED | OUTPATIENT
Start: 2020-07-24 | End: 2020-08-23

## 2020-06-25 RX ORDER — OXYBUTYNIN CHLORIDE 5 MG/1
5 TABLET, EXTENDED RELEASE ORAL DAILY
COMMUNITY
Start: 2020-06-15 | End: 2020-09-28

## 2020-06-25 NOTE — PROGRESS NOTES
Telephone Appointment Visit   As a means of avoiding spread of COVID-19, this visit is being conducted by telephone. This telephone visit was initiated by the patient and they verbally consented.    Time at start of call: 1:34 PM    Reason for Call:  Medication Follow-up and Symptom Follow-up    HPI:    She has been doing all right in regards to her depression.  She states that there are times where she feels nervous and jittery and sad but it only lasts for a few minutes.  She continues to be compliant with all of her psychiatric medication and endorses benefit.  She has been staying indoors and is keeping in touch with her family over phone.  She denies any significant struggles with sleep or appetite.  She denies anhedonia.  She has been doing all right in regards to her fibromyalgia and fatigue.  She is taking her Adderall 3 times a day and is doing all right in regards to her focus and concentration.  She denies having thoughts of wanting to hurt herself or others.    Labs / Images Reviewed:   [facility]  - appropriate refills, no abuse suspected   Lipid profile with low HDL,  A1c elevated at 5.9 (2/2020)    Assessment and Plan:     1. MDD (major depressive disorder), recurrent, in full remission (HCC)  2. ADHD (attention deficit hyperactivity disorder), inattentive type  3. Chronic fatigue - secondary to fibromyalgia and depression    -Continue Adderall 15 mg 3 times daily for ADHD.  E-prescribed x 3 months.   -Continue Effexor  mg at bedtime for depression  -Continue Abilify 10 mg at bedtime for depression augmentation   -Metabolic monitoring (3/2020): lipid profile with low HDL,  A1c elevated at 5.9    Follow-up: 3 months    Time at end of call: 1:42 PM  Total Time Spent: 5-10 minutes    Argentina Brunson M.D.

## 2020-07-29 RX ORDER — ARIPIPRAZOLE 10 MG/1
TABLET ORAL
Qty: 90 TAB | Refills: 0 | Status: SHIPPED | OUTPATIENT
Start: 2020-07-29 | End: 2020-09-28 | Stop reason: SDUPTHER

## 2020-09-22 DIAGNOSIS — F90.0 ADHD (ATTENTION DEFICIT HYPERACTIVITY DISORDER), INATTENTIVE TYPE: ICD-10-CM

## 2020-09-22 RX ORDER — DEXTROAMPHETAMINE SACCHARATE, AMPHETAMINE ASPARTATE, DEXTROAMPHETAMINE SULFATE AND AMPHETAMINE SULFATE 3.75; 3.75; 3.75; 3.75 MG/1; MG/1; MG/1; MG/1
15 TABLET ORAL 3 TIMES DAILY
Qty: 90 TAB | Refills: 0 | Status: SHIPPED
Start: 2020-09-22 | End: 2020-09-28

## 2020-09-28 ENCOUNTER — OFFICE VISIT (OUTPATIENT)
Dept: BEHAVIORAL HEALTH | Facility: CLINIC | Age: 65
End: 2020-09-28
Payer: MEDICARE

## 2020-09-28 VITALS — WEIGHT: 200 LBS | BODY MASS INDEX: 34.15 KG/M2 | HEIGHT: 64 IN

## 2020-09-28 DIAGNOSIS — F90.0 ADHD (ATTENTION DEFICIT HYPERACTIVITY DISORDER), INATTENTIVE TYPE: ICD-10-CM

## 2020-09-28 DIAGNOSIS — F33.42 MDD (MAJOR DEPRESSIVE DISORDER), RECURRENT, IN FULL REMISSION (HCC): ICD-10-CM

## 2020-09-28 DIAGNOSIS — R53.82 CHRONIC FATIGUE: ICD-10-CM

## 2020-09-28 PROCEDURE — 99214 OFFICE O/P EST MOD 30 MIN: CPT | Performed by: PSYCHIATRY & NEUROLOGY

## 2020-09-28 RX ORDER — VENLAFAXINE HYDROCHLORIDE 75 MG/1
150 CAPSULE, EXTENDED RELEASE ORAL DAILY
Qty: 180 CAP | Refills: 0
Start: 2020-09-28 | End: 2020-12-23

## 2020-09-28 RX ORDER — DEXTROAMPHETAMINE SACCHARATE, AMPHETAMINE ASPARTATE, DEXTROAMPHETAMINE SULFATE AND AMPHETAMINE SULFATE 3.75; 3.75; 3.75; 3.75 MG/1; MG/1; MG/1; MG/1
15 TABLET ORAL 3 TIMES DAILY
Qty: 90 TAB | Refills: 0 | Status: SHIPPED | OUTPATIENT
Start: 2020-12-18 | End: 2021-01-11 | Stop reason: SDUPTHER

## 2020-09-28 RX ORDER — CLOTRIMAZOLE 1 %
CREAM (GRAM) TOPICAL
COMMUNITY
Start: 2020-09-24 | End: 2024-03-06

## 2020-09-28 RX ORDER — ARIPIPRAZOLE 10 MG/1
10 TABLET ORAL
Qty: 90 TAB | Refills: 0 | Status: SHIPPED | OUTPATIENT
Start: 2020-09-28 | End: 2020-12-23

## 2020-09-28 RX ORDER — DEXTROAMPHETAMINE SACCHARATE, AMPHETAMINE ASPARTATE, DEXTROAMPHETAMINE SULFATE AND AMPHETAMINE SULFATE 3.75; 3.75; 3.75; 3.75 MG/1; MG/1; MG/1; MG/1
15 TABLET ORAL 3 TIMES DAILY
Qty: 90 TAB | Refills: 0 | Status: SHIPPED | OUTPATIENT
Start: 2020-10-21 | End: 2020-11-20

## 2020-09-28 RX ORDER — DEXTROAMPHETAMINE SACCHARATE, AMPHETAMINE ASPARTATE, DEXTROAMPHETAMINE SULFATE AND AMPHETAMINE SULFATE 3.75; 3.75; 3.75; 3.75 MG/1; MG/1; MG/1; MG/1
15 TABLET ORAL 3 TIMES DAILY
Qty: 90 TAB | Refills: 0 | Status: SHIPPED | OUTPATIENT
Start: 2020-11-19 | End: 2020-12-19

## 2020-09-28 RX ORDER — OXYBUTYNIN CHLORIDE 5 MG/1
5 TABLET ORAL 2 TIMES DAILY
COMMUNITY
Start: 2020-09-17 | End: 2021-02-04

## 2020-09-28 ASSESSMENT — FIBROSIS 4 INDEX: FIB4 SCORE: 0.55

## 2020-09-28 NOTE — PROGRESS NOTES
PSYCHIATRY FOLLOW-UP NOTE      Chief Complaint   Patient presents with   • Follow-Up     depression, ADHD         History Of Present Illness:  Alesia Cesar is a 65 y.o. old female with major depressive disorder, ADHD, dyslipidemia, fibromyalgia, hypothyroidism, diabetes mellitus type 2, essential tremor comes in today for follow up, was last seen 3 months ago.  She has been doing really good in regards to her depression since her last visit here.  She continues to be compliant with Effexor and is endorsing benefit.  She denies any new psychosocial stressors.  She continues to have good support from her siblings and is still meeting with them at least once a week.  She denies any side effects from Effexor.  She is still taking her Adderall 3 times a day with benefit.  She still feels tired and wishes she would have more energy than she normally does.  She is able to accomplish the chores around her house and asks for help from siblings if she needs it.  She denies any struggles with her sleep or appetite.  She denies having thoughts of wanting to hurt herself or others.    Social History:   She is single, has been  and  x1, no kids, lives alone in [place], good support from 3 sisters and a brother, on disability for psychiatric illness.    Substance Use:  Alcohol - Denies  Nicotine - Smokes 1 PPD  Cannabis - Recreational cannabis use, may be once a month with a close friend   Illicit drugs - Denies     Past Medication Trials:  Prozac, Paxil, Zoloft, Celexa, Lexapro, Wellbutrin, Cymbalta, Adderall XR, Propranolol     Medications:  Current Outpatient Medications   Medication Sig Dispense Refill   • venlafaxine XR (EFFEXOR XR) 75 MG CAPSULE SR 24 HR Take 3 Caps by mouth every day. 270 Cap 0   • ARIPiprazole (ABILIFY) 10 MG Tab TAKE ONE TABLET BY MOUTH EVERY DAY 90 Tab 0   • oxybutynin SR (DITROPAN-XL) 5 MG TABLET SR 24 HR Take 5 mg by mouth every day.     • metFORMIN (GLUCOPHAGE) 850 MG Tab Take 850 mg by  "mouth 2 Times a Day.     • levothyroxine (SYNTHROID) 88 MCG Tab Take 88 mcg by mouth every morning.     • atorvastatin (LIPITOR) 20 MG Tab Take 20 mg by mouth every day.     • fluticasone (FLONASE) 50 MCG/ACT nasal spray      • montelukast (SINGULAIR) 10 MG Tab Take 10 mg by mouth every day.     • naproxen (NAPROSYN) 500 MG Tab Take 500 mg by mouth 2 Times a Day.       No current facility-administered medications for this visit.        Review Of Systems:    Constitutional - Positive for fatigue  Respiratory - Negative for shortness of breath, cough  CVS - Negative for chest pain, palpitations  GI - Negative for nausea, vomiting, abdominal pain, diarrhea, constipation  Musculoskeletal - Negative for back pain  Neurological - Negative for headaches. Positive for upper extremity tremor (right > left)  Psychiatric - Negative for depression, poor sleep    Physical Examination:  Vital signs: Ht 1.626 m (5' 4\")   Wt 90.7 kg (200 lb)   BMI 34.33 kg/m²     Musculoskeletal: Normal gait. No abnormal movements.     Mental Status Evaluation:   General: Middle aged white female, dressed in casual attire, good grooming and hygiene, in no apparent distress, calm and cooperative, good eye contact, no psychomotor agitation or retardation  Orientation: Alert and oriented to person, place and time  Recent and remote memory: Grossly intact  Attention span and concentration: Grossly intact  Speech: Spontaneous, normal rate, rhythm and tone  Thought Process: Linear, logical and goal directed  Thought Content: Denies suicidal or homicidal ideations, intent or plan  Perception: Denies auditory or visual hallucinations. No delusions noted  Associations: Intact  Language: Appropriate  Fund of knowledge and vocabulary: Grossly adequate  Mood: \"good\"  Affect: Euthymic, mood congruent  Insight: Good  Judgment: Good    Depression screening:  Depression Screen (PHQ-2/PHQ-9) 9/16/2019 1/2/2020 3/23/2020   PHQ-2 Total Score - - 1   PHQ-2 Total " Score 1 0 -   PHQ-9 Total Score - - 4     Interpretation of PHQ-9 Total Score   Score Severity   1-4 No Depression   5-9 Mild Depression   10-14 Moderate Depression   15-19 Moderately Severe Depression   20-27 Severe Depression    Medical Records/Labs/Diagnostic Tests Reviewed:  [facility]  records - appropriate refills, no abuse suspected      Impression:  1.  ADHD, inattentive type - stable  2.  Major depressive disorder, recurrent, in full remission - stable  3.  Chronic fatigue secondary to fibromyalgia and depression - stable    Plan:  1.  Continue Adderall 15 mg 3 times daily for ADHD.  E- prescribed for 3 months  2.  Decrease Effexor XR to 150 mg at bedtime for depression given stable symptoms.  3.  Continue Abilify 10 mg at bedtime for depression augmentation  - AIMS 0 (3/2020)  - Metabolic monitoring (3/2020): Lipid profile with low HDL, A1c elevated at 5.8  - Repeat yearly metabolic monitoring labs due in 3/2021    Return to clinic in 4 months or sooner if symptoms worsen    The proposed treatment plan was discussed with the patient who was provided the opportunity to ask questions and make suggestions regarding alternative treatment. Patient verbalized understanding and expressed agreement with the plan.     Argentina Brunson M.D.  09/28/20    This note was created using voice recognition software (Dragon). The accuracy of the dictation is limited by the abilities of the software. I have reviewed the note prior to signing, however some errors in grammar and context are still possible. If you have any questions related to this note please do not hesitate to contact our office.

## 2020-10-24 ENCOUNTER — HOSPITAL ENCOUNTER (OUTPATIENT)
Dept: LAB | Facility: MEDICAL CENTER | Age: 65
End: 2020-10-24
Attending: FAMILY MEDICINE
Payer: MEDICARE

## 2020-10-24 LAB
25(OH)D3 SERPL-MCNC: 64 NG/ML (ref 30–100)
ALBUMIN SERPL BCP-MCNC: 4.6 G/DL (ref 3.2–4.9)
ALBUMIN/GLOB SERPL: 1.3 G/DL
ALP SERPL-CCNC: 139 U/L (ref 30–99)
ALT SERPL-CCNC: 47 U/L (ref 2–50)
ANION GAP SERPL CALC-SCNC: 15 MMOL/L (ref 7–16)
AST SERPL-CCNC: 36 U/L (ref 12–45)
BASOPHILS # BLD AUTO: 0.5 % (ref 0–1.8)
BASOPHILS # BLD: 0.05 K/UL (ref 0–0.12)
BILIRUB SERPL-MCNC: 0.7 MG/DL (ref 0.1–1.5)
BUN SERPL-MCNC: 16 MG/DL (ref 8–22)
CALCIUM SERPL-MCNC: 9.9 MG/DL (ref 8.5–10.5)
CHLORIDE SERPL-SCNC: 93 MMOL/L (ref 96–112)
CHOLEST SERPL-MCNC: 163 MG/DL (ref 100–199)
CO2 SERPL-SCNC: 23 MMOL/L (ref 20–33)
CREAT SERPL-MCNC: 1.02 MG/DL (ref 0.5–1.4)
EOSINOPHIL # BLD AUTO: 0.34 K/UL (ref 0–0.51)
EOSINOPHIL NFR BLD: 3.3 % (ref 0–6.9)
ERYTHROCYTE [DISTWIDTH] IN BLOOD BY AUTOMATED COUNT: 52.4 FL (ref 35.9–50)
EST. AVERAGE GLUCOSE BLD GHB EST-MCNC: 128 MG/DL
FASTING STATUS PATIENT QL REPORTED: NORMAL
GLOBULIN SER CALC-MCNC: 3.6 G/DL (ref 1.9–3.5)
GLUCOSE SERPL-MCNC: 92 MG/DL (ref 65–99)
HBA1C MFR BLD: 6.1 % (ref 0–5.6)
HCT VFR BLD AUTO: 45.3 % (ref 37–47)
HDLC SERPL-MCNC: 39 MG/DL
HGB BLD-MCNC: 14.7 G/DL (ref 12–16)
IMM GRANULOCYTES # BLD AUTO: 0.05 K/UL (ref 0–0.11)
IMM GRANULOCYTES NFR BLD AUTO: 0.5 % (ref 0–0.9)
LDLC SERPL CALC-MCNC: 97 MG/DL
LYMPHOCYTES # BLD AUTO: 4.27 K/UL (ref 1–4.8)
LYMPHOCYTES NFR BLD: 42 % (ref 22–41)
MCH RBC QN AUTO: 28.7 PG (ref 27–33)
MCHC RBC AUTO-ENTMCNC: 32.5 G/DL (ref 33.6–35)
MCV RBC AUTO: 88.3 FL (ref 81.4–97.8)
MONOCYTES # BLD AUTO: 0.43 K/UL (ref 0–0.85)
MONOCYTES NFR BLD AUTO: 4.2 % (ref 0–13.4)
NEUTROPHILS # BLD AUTO: 5.02 K/UL (ref 2–7.15)
NEUTROPHILS NFR BLD: 49.5 % (ref 44–72)
NRBC # BLD AUTO: 0 K/UL
NRBC BLD-RTO: 0 /100 WBC
PLATELET # BLD AUTO: 422 K/UL (ref 164–446)
PMV BLD AUTO: 10.7 FL (ref 9–12.9)
POTASSIUM SERPL-SCNC: 4 MMOL/L (ref 3.6–5.5)
PROT SERPL-MCNC: 8.2 G/DL (ref 6–8.2)
RBC # BLD AUTO: 5.13 M/UL (ref 4.2–5.4)
SODIUM SERPL-SCNC: 131 MMOL/L (ref 135–145)
T4 FREE SERPL-MCNC: 1.5 NG/DL (ref 0.93–1.7)
TRIGL SERPL-MCNC: 137 MG/DL (ref 0–149)
TSH SERPL DL<=0.005 MIU/L-ACNC: 2.33 UIU/ML (ref 0.38–5.33)
WBC # BLD AUTO: 10.2 K/UL (ref 4.8–10.8)

## 2020-10-24 PROCEDURE — 82306 VITAMIN D 25 HYDROXY: CPT

## 2020-10-24 PROCEDURE — 85025 COMPLETE CBC W/AUTO DIFF WBC: CPT

## 2020-10-24 PROCEDURE — 84439 ASSAY OF FREE THYROXINE: CPT

## 2020-10-24 PROCEDURE — 83036 HEMOGLOBIN GLYCOSYLATED A1C: CPT | Mod: GA

## 2020-10-24 PROCEDURE — 36415 COLL VENOUS BLD VENIPUNCTURE: CPT | Mod: GA

## 2020-10-24 PROCEDURE — 80053 COMPREHEN METABOLIC PANEL: CPT

## 2020-10-24 PROCEDURE — 84443 ASSAY THYROID STIM HORMONE: CPT

## 2020-10-24 PROCEDURE — 80061 LIPID PANEL: CPT

## 2021-01-05 ENCOUNTER — APPOINTMENT (OUTPATIENT)
Dept: DERMATOLOGY | Facility: CLINIC | Age: 66
End: 2021-01-05

## 2021-01-14 ENCOUNTER — APPOINTMENT (OUTPATIENT)
Dept: BEHAVIORAL HEALTH | Facility: CLINIC | Age: 66
End: 2021-01-14
Payer: MEDICARE

## 2021-02-04 ENCOUNTER — TELEMEDICINE (OUTPATIENT)
Dept: BEHAVIORAL HEALTH | Facility: CLINIC | Age: 66
End: 2021-02-04
Payer: MEDICARE

## 2021-02-04 VITALS — HEIGHT: 64 IN | BODY MASS INDEX: 34.15 KG/M2 | WEIGHT: 200 LBS

## 2021-02-04 DIAGNOSIS — F90.0 ADHD (ATTENTION DEFICIT HYPERACTIVITY DISORDER), INATTENTIVE TYPE: ICD-10-CM

## 2021-02-04 DIAGNOSIS — F33.42 MDD (MAJOR DEPRESSIVE DISORDER), RECURRENT, IN FULL REMISSION (HCC): ICD-10-CM

## 2021-02-04 PROCEDURE — 99214 OFFICE O/P EST MOD 30 MIN: CPT | Mod: 95,CR | Performed by: PSYCHIATRY & NEUROLOGY

## 2021-02-04 RX ORDER — VENLAFAXINE HYDROCHLORIDE 150 MG/1
150 CAPSULE, EXTENDED RELEASE ORAL DAILY
Qty: 90 CAP | Refills: 0 | Status: SHIPPED | OUTPATIENT
Start: 2021-02-04 | End: 2021-05-06 | Stop reason: SDUPTHER

## 2021-02-04 RX ORDER — DEXTROAMPHETAMINE SACCHARATE, AMPHETAMINE ASPARTATE, DEXTROAMPHETAMINE SULFATE AND AMPHETAMINE SULFATE 3.75; 3.75; 3.75; 3.75 MG/1; MG/1; MG/1; MG/1
15 TABLET ORAL 3 TIMES DAILY
Qty: 90 TAB | Refills: 0 | Status: SHIPPED | OUTPATIENT
Start: 2021-02-14 | End: 2021-03-16

## 2021-02-04 RX ORDER — ARIPIPRAZOLE 10 MG/1
10 TABLET ORAL DAILY
Qty: 90 TAB | Refills: 0 | Status: SHIPPED | OUTPATIENT
Start: 2021-02-04 | End: 2021-05-06 | Stop reason: SDUPTHER

## 2021-02-04 RX ORDER — OXYBUTYNIN CHLORIDE 10 MG/1
TABLET, EXTENDED RELEASE ORAL
COMMUNITY
Start: 2021-01-22 | End: 2021-05-06

## 2021-02-04 RX ORDER — DEXTROAMPHETAMINE SACCHARATE, AMPHETAMINE ASPARTATE, DEXTROAMPHETAMINE SULFATE AND AMPHETAMINE SULFATE 3.75; 3.75; 3.75; 3.75 MG/1; MG/1; MG/1; MG/1
15 TABLET ORAL 3 TIMES DAILY
Qty: 90 TAB | Refills: 0 | Status: SHIPPED | OUTPATIENT
Start: 2021-04-13 | End: 2021-05-13

## 2021-02-04 RX ORDER — DEXTROAMPHETAMINE SACCHARATE, AMPHETAMINE ASPARTATE, DEXTROAMPHETAMINE SULFATE AND AMPHETAMINE SULFATE 3.75; 3.75; 3.75; 3.75 MG/1; MG/1; MG/1; MG/1
15 TABLET ORAL 3 TIMES DAILY
Qty: 90 TAB | Refills: 0 | Status: SHIPPED | OUTPATIENT
Start: 2021-03-15 | End: 2021-04-14

## 2021-02-04 ASSESSMENT — FIBROSIS 4 INDEX: FIB4 SCORE: 0.78

## 2021-02-04 ASSESSMENT — PATIENT HEALTH QUESTIONNAIRE - PHQ9: CLINICAL INTERPRETATION OF PHQ2 SCORE: 0

## 2021-02-04 NOTE — PROGRESS NOTES
PSYCHIATRY VIRTUAL VISIT FOLLOW-UP NOTE      Chief Complaint   Patient presents with   • Follow-Up     depression, ADHD       This evaluation was conducted via Zoom using secure and encrypted videoconferencing technology. The patient was in a private location in the [facility].    The patient's identity was confirmed and verbal consent was obtained for this virtual visit.    History Of Present Illness:  Alesia Cesar is a 65 y.o. female with major depressive disorder, ADHD, dyslipidemia, fibromyalgia, hypothyroidism, diabetes mellitus type 2, essential tremor comes in today for follow up, was last seen over 4 months ago.  She has been doing really good in regards to her depression since her last visit with me.  She did have noticed any worsening depression with the lower dose of Effexor.  She mentions that there were some intrusive thoughts here and there but she has been able to handle them.  She does feel lonely since she lives alone and wants to get a cat but her siblings want that her have one.  She denies any other psychosocial stressors.  She has been doing good in regards to her sleep and appetite.  She continues to take her Adderall 3 times daily with benefit and denies any side effects from it.  She denies having thoughts of wanting to hurt herself or others.    Social History:   She is single,  and  x1, no kids, lives alone in [place], good support from 3 sisters and a brother, on disability for psychiatric illness.    Substance Use:  Alcohol - Denies  Nicotine - Smokes 1 PPD  Cannabis - Recreational cannabis use, may be once a month with a friend   Illicit drugs - Denies     Past Medication Trials:  Prozac, Paxil, Zoloft, Celexa, Lexapro, Wellbutrin, Cymbalta, Adderall XR, Propranolol     Medications:  Current Outpatient Medications   Medication Sig Dispense Refill   • oxybutynin SR (DITROPAN-XL) 10 MG CR tablet      • [START ON 4/13/2021] amphetamine-dextroamphetamine (ADDERALL) 15 MG  "tablet Take 1 Tab by mouth 3 times a day for 30 days. 90 Tab 0   • [START ON 3/15/2021] amphetamine-dextroamphetamine (ADDERALL) 15 MG tablet Take 1 Tab by mouth 3 times a day for 30 days. 90 Tab 0   • [START ON 2/14/2021] amphetamine-dextroamphetamine (ADDERALL) 15 MG tablet Take 1 Tab by mouth 3 times a day for 30 days. 90 Tab 0   • ARIPiprazole (ABILIFY) 10 MG Tab Take 1 Tab by mouth every day. 90 Tab 0   • venlafaxine (EFFEXOR-XR) 150 MG extended-release capsule Take 1 Cap by mouth every day. 90 Cap 0   • clotrimazole (LOTRIMIN) 1 % Cream      • metFORMIN (GLUCOPHAGE) 850 MG Tab Take 850 mg by mouth 2 Times a Day.     • levothyroxine (SYNTHROID) 88 MCG Tab Take 88 mcg by mouth every morning.     • atorvastatin (LIPITOR) 20 MG Tab Take 20 mg by mouth every day.     • montelukast (SINGULAIR) 10 MG Tab Take 10 mg by mouth every day.       No current facility-administered medications for this visit.        Review Of Systems:    Constitutional - Positive for fatigue  Psychiatric - Negative for depression, poor sleep    Physical Examination:  Vital signs: Ht 1.626 m (5' 4\")   Wt 90.7 kg (200 lb)   LMP  (LMP Unknown)   Breastfeeding No   BMI 34.33 kg/m²     Musculoskeletal: Normal gait. No abnormal movements.     Mental Status Evaluation:   General: Middle aged white female, dressed in casual attire, good grooming and hygiene, in no apparent distress, calm and cooperative, good eye contact, no psychomotor agitation or retardation  Orientation: Alert and oriented to person, place and time  Recent and remote memory: Grossly intact  Attention span and concentration: Grossly intact  Speech: Spontaneous, normal rate, rhythm and tone  Thought Process: Linear, logical and goal directed  Thought Content: Denies suicidal or homicidal ideations, intent or plan  Perception: Denies auditory or visual hallucinations. No delusions noted  Associations: Intact  Language: Appropriate  Fund of knowledge and vocabulary: Grossly " "adequate  Mood: \"good\"  Affect: Euthymic, mood congruent  Insight: Good  Judgment: Good    Depression screening:  Depression Screen (PHQ-2/PHQ-9) 1/2/2020 3/23/2020 2/4/2021   PHQ-2 Total Score - 1 -   PHQ-2 Total Score 0 - 0   PHQ-9 Total Score - 4 -     Interpretation of PHQ-9 Total Score   Score Severity   1-4 No Depression   5-9 Mild Depression   10-14 Moderate Depression   15-19 Moderately Severe Depression   20-27 Severe Depression    Medical Records/Labs/Diagnostic Tests Reviewed:  [facility]  records - appropriate refills, no abuse suspected  Lipid profile with low HDL, A1c elevated at 6.1 (10/2020)      Impression:  1.  ADHD, inattentive type   2.  Major depressive disorder, recurrent, in full remission     Plan:  1.  Continue Adderall 15 mg 3 times daily for ADHD.  E- prescribed for 3 months  2.  Continue Effexor  mg at bedtime for depression given stable symptoms.  3.  Continue Abilify 10 mg at bedtime for depression augmentation  - AIMS 0 (3/2020)  - Metabolic monitoring (10/2020): Lipid profile with low HDL, A1c elevated at 6.1  - Repeat yearly metabolic monitoring labs due in 10/2021    Return to clinic in 3 months or sooner if symptoms worsen    The proposed treatment plan was discussed with the patient who was provided the opportunity to ask questions and make suggestions regarding alternative treatment. Patient verbalized understanding and expressed agreement with the plan.     Argentina Brunson M.D.  02/04/21    This note was created using voice recognition software (Dragon). The accuracy of the dictation is limited by the abilities of the software. I have reviewed the note prior to signing, however some errors in grammar and context are still possible. If you have any questions related to this note please do not hesitate to contact our office.     "

## 2021-02-16 ENCOUNTER — TRANSCRIPTION ENCOUNTER (OUTPATIENT)
Age: 66
End: 2021-02-16

## 2021-02-25 ENCOUNTER — TRANSCRIPTION ENCOUNTER (OUTPATIENT)
Age: 66
End: 2021-02-25

## 2021-03-04 ENCOUNTER — INPATIENT (INPATIENT)
Facility: HOSPITAL | Age: 66
LOS: 4 days | Discharge: HOME | DRG: 137 | End: 2021-03-09
Attending: HOSPITALIST | Admitting: HOSPITALIST
Payer: MEDICAID

## 2021-03-04 VITALS — WEIGHT: 235.01 LBS

## 2021-03-04 DIAGNOSIS — U07.1 COVID-19: ICD-10-CM

## 2021-03-04 LAB
ALBUMIN SERPL ELPH-MCNC: 3.2 G/DL — LOW (ref 3.3–5)
ALP SERPL-CCNC: 115 U/L — SIGNIFICANT CHANGE UP (ref 40–120)
ALT FLD-CCNC: 93 U/L — HIGH (ref 12–78)
ANION GAP SERPL CALC-SCNC: 8 MMOL/L — SIGNIFICANT CHANGE UP (ref 5–17)
AST SERPL-CCNC: 32 U/L — SIGNIFICANT CHANGE UP (ref 15–37)
BASOPHILS # BLD AUTO: 0.01 K/UL — SIGNIFICANT CHANGE UP (ref 0–0.2)
BASOPHILS NFR BLD AUTO: 0.2 % — SIGNIFICANT CHANGE UP (ref 0–2)
BILIRUB SERPL-MCNC: 0.5 MG/DL — SIGNIFICANT CHANGE UP (ref 0.2–1.2)
BUN SERPL-MCNC: 19 MG/DL — SIGNIFICANT CHANGE UP (ref 7–23)
CALCIUM SERPL-MCNC: 9.2 MG/DL — SIGNIFICANT CHANGE UP (ref 8.5–10.1)
CHLORIDE SERPL-SCNC: 102 MMOL/L — SIGNIFICANT CHANGE UP (ref 96–108)
CO2 SERPL-SCNC: 26 MMOL/L — SIGNIFICANT CHANGE UP (ref 22–31)
CREAT SERPL-MCNC: 0.82 MG/DL — SIGNIFICANT CHANGE UP (ref 0.5–1.3)
CRP SERPL-MCNC: 39 MG/L — HIGH
D DIMER BLD IA.RAPID-MCNC: 185 NG/ML DDU — SIGNIFICANT CHANGE UP
EOSINOPHIL # BLD AUTO: 0 K/UL — SIGNIFICANT CHANGE UP (ref 0–0.5)
EOSINOPHIL NFR BLD AUTO: 0 % — SIGNIFICANT CHANGE UP (ref 0–6)
FERRITIN SERPL-MCNC: 653 NG/ML — HIGH (ref 15–150)
GLUCOSE SERPL-MCNC: 107 MG/DL — HIGH (ref 70–99)
HCT VFR BLD CALC: 44.5 % — SIGNIFICANT CHANGE UP (ref 34.5–45)
HGB BLD-MCNC: 14.7 G/DL — SIGNIFICANT CHANGE UP (ref 11.5–15.5)
IMM GRANULOCYTES NFR BLD AUTO: 1 % — SIGNIFICANT CHANGE UP (ref 0–1.5)
LYMPHOCYTES # BLD AUTO: 0.81 K/UL — LOW (ref 1–3.3)
LYMPHOCYTES # BLD AUTO: 16.6 % — SIGNIFICANT CHANGE UP (ref 13–44)
MCHC RBC-ENTMCNC: 28.1 PG — SIGNIFICANT CHANGE UP (ref 27–34)
MCHC RBC-ENTMCNC: 33 GM/DL — SIGNIFICANT CHANGE UP (ref 32–36)
MCV RBC AUTO: 85.1 FL — SIGNIFICANT CHANGE UP (ref 80–100)
MONOCYTES # BLD AUTO: 0.37 K/UL — SIGNIFICANT CHANGE UP (ref 0–0.9)
MONOCYTES NFR BLD AUTO: 7.6 % — SIGNIFICANT CHANGE UP (ref 2–14)
NEUTROPHILS # BLD AUTO: 3.63 K/UL — SIGNIFICANT CHANGE UP (ref 1.8–7.4)
NEUTROPHILS NFR BLD AUTO: 74.6 % — SIGNIFICANT CHANGE UP (ref 43–77)
PLATELET # BLD AUTO: 143 K/UL — LOW (ref 150–400)
POTASSIUM SERPL-MCNC: 3.9 MMOL/L — SIGNIFICANT CHANGE UP (ref 3.5–5.3)
POTASSIUM SERPL-SCNC: 3.9 MMOL/L — SIGNIFICANT CHANGE UP (ref 3.5–5.3)
PROT SERPL-MCNC: 7.6 GM/DL — SIGNIFICANT CHANGE UP (ref 6–8.3)
RBC # BLD: 5.23 M/UL — HIGH (ref 3.8–5.2)
RBC # FLD: 13.5 % — SIGNIFICANT CHANGE UP (ref 10.3–14.5)
SODIUM SERPL-SCNC: 136 MMOL/L — SIGNIFICANT CHANGE UP (ref 135–145)
WBC # BLD: 4.87 K/UL — SIGNIFICANT CHANGE UP (ref 3.8–10.5)
WBC # FLD AUTO: 4.87 K/UL — SIGNIFICANT CHANGE UP (ref 3.8–10.5)

## 2021-03-04 PROCEDURE — 80048 BASIC METABOLIC PNL TOTAL CA: CPT

## 2021-03-04 PROCEDURE — 86803 HEPATITIS C AB TEST: CPT

## 2021-03-04 PROCEDURE — 82565 ASSAY OF CREATININE: CPT

## 2021-03-04 PROCEDURE — 82728 ASSAY OF FERRITIN: CPT

## 2021-03-04 PROCEDURE — 80076 HEPATIC FUNCTION PANEL: CPT

## 2021-03-04 PROCEDURE — 85610 PROTHROMBIN TIME: CPT

## 2021-03-04 PROCEDURE — 84145 PROCALCITONIN (PCT): CPT

## 2021-03-04 PROCEDURE — 93010 ELECTROCARDIOGRAM REPORT: CPT

## 2021-03-04 PROCEDURE — C9399: CPT

## 2021-03-04 PROCEDURE — 99223 1ST HOSP IP/OBS HIGH 75: CPT

## 2021-03-04 PROCEDURE — 71045 X-RAY EXAM CHEST 1 VIEW: CPT | Mod: 26

## 2021-03-04 PROCEDURE — 36415 COLL VENOUS BLD VENIPUNCTURE: CPT

## 2021-03-04 PROCEDURE — 85379 FIBRIN DEGRADATION QUANT: CPT

## 2021-03-04 RX ORDER — ENOXAPARIN SODIUM 100 MG/ML
40 INJECTION SUBCUTANEOUS EVERY 12 HOURS
Refills: 0 | Status: DISCONTINUED | OUTPATIENT
Start: 2021-03-04 | End: 2021-03-09

## 2021-03-04 RX ORDER — ACETAMINOPHEN 500 MG
650 TABLET ORAL EVERY 4 HOURS
Refills: 0 | Status: DISCONTINUED | OUTPATIENT
Start: 2021-03-04 | End: 2021-03-09

## 2021-03-04 RX ORDER — SODIUM CHLORIDE 9 MG/ML
1000 INJECTION INTRAMUSCULAR; INTRAVENOUS; SUBCUTANEOUS
Refills: 0 | Status: DISCONTINUED | OUTPATIENT
Start: 2021-03-04 | End: 2021-03-05

## 2021-03-04 RX ORDER — ALBUTEROL 90 UG/1
2 AEROSOL, METERED ORAL EVERY 4 HOURS
Refills: 0 | Status: DISCONTINUED | OUTPATIENT
Start: 2021-03-04 | End: 2021-03-09

## 2021-03-04 RX ORDER — INFLUENZA VIRUS VACCINE 15; 15; 15; 15 UG/.5ML; UG/.5ML; UG/.5ML; UG/.5ML
0.5 SUSPENSION INTRAMUSCULAR ONCE
Refills: 0 | Status: DISCONTINUED | OUTPATIENT
Start: 2021-03-04 | End: 2021-03-09

## 2021-03-04 RX ORDER — LANOLIN ALCOHOL/MO/W.PET/CERES
3 CREAM (GRAM) TOPICAL AT BEDTIME
Refills: 0 | Status: DISCONTINUED | OUTPATIENT
Start: 2021-03-04 | End: 2021-03-09

## 2021-03-04 RX ORDER — DEXAMETHASONE 0.5 MG/5ML
6 ELIXIR ORAL DAILY
Refills: 0 | Status: DISCONTINUED | OUTPATIENT
Start: 2021-03-05 | End: 2021-03-09

## 2021-03-04 RX ORDER — REMDESIVIR 5 MG/ML
200 INJECTION INTRAVENOUS EVERY 24 HOURS
Refills: 0 | Status: COMPLETED | OUTPATIENT
Start: 2021-03-04 | End: 2021-03-04

## 2021-03-04 RX ORDER — DEXAMETHASONE 0.5 MG/5ML
6 ELIXIR ORAL ONCE
Refills: 0 | Status: COMPLETED | OUTPATIENT
Start: 2021-03-04 | End: 2021-03-04

## 2021-03-04 RX ADMIN — Medication 3 MILLIGRAM(S): at 23:35

## 2021-03-04 RX ADMIN — REMDESIVIR 580 MILLIGRAM(S): 5 INJECTION INTRAVENOUS at 13:03

## 2021-03-04 RX ADMIN — ENOXAPARIN SODIUM 40 MILLIGRAM(S): 100 INJECTION SUBCUTANEOUS at 21:50

## 2021-03-04 RX ADMIN — Medication 6 MILLIGRAM(S): at 11:41

## 2021-03-04 RX ADMIN — Medication 100 MILLIGRAM(S): at 21:50

## 2021-03-04 RX ADMIN — SODIUM CHLORIDE 100 MILLILITER(S): 9 INJECTION INTRAMUSCULAR; INTRAVENOUS; SUBCUTANEOUS at 11:42

## 2021-03-04 NOTE — PATIENT PROFILE ADULT - HAVE YOU EXPERIENCED VIOLENCE OR A TRAUMATIC EVENT?
no Erivedge Counseling- I discussed with the patient the risks of Erivedge including but not limited to nausea, vomiting, diarrhea, constipation, weight loss, changes in the sense of taste, decreased appetite, muscle spasms, and hair loss.  The patient verbalized understanding of the proper use and possible adverse effects of Erivedge.  All of the patient's questions and concerns were addressed.

## 2021-03-04 NOTE — H&P ADULT - HISTORY OF PRESENT ILLNESS
66 y/o F with a PMHx of HTN, HLD, Breast CA 10 years in remission, presents to the ED c/o cough, SOB. Pt went to [facility] on 02/24 and was tested + for COVID.  Symptoms began with subjective fevers, headeache, dry cough and sob. Was seen by her pcp for consideration of monoclonal antibodies and was referred to ER. On presentation she was hypoxic to 86% on RA corrected to 94% on 2L. CXR c/w bilateral infiltrates.     Social: no toxic habits  Shx: none  Fhx: none

## 2021-03-04 NOTE — ED PROVIDER NOTE - OBJECTIVE STATEMENT
66 y/o F with a PMHx of HTN, HLD, Breast CA 10 years in remission, presents to the ED c/o cough, SOB. Pt went to [facility] on 02/24 and was tested + for COVID. Pt reports sx started with a headache 3-4 days before being tested +. Reports fever, HA, myalgia, diarrhea last couple of days but with worsening SOB the last couple days. Pt checked O2 which was 88%, went to [facility] today to have antibody infusion and was hypoxic so was not given infusion and referred to ED. No abd pain, N/V, CP.

## 2021-03-04 NOTE — H&P ADULT - NSHPLABSRESULTS_GEN_ALL_CORE
LABS: All Labs Reviewed:                        14.7   4.87  )-----------( 143      ( 04 Mar 2021 10:39 )             44.5     03-04    136  |  102  |  19  ----------------------------<  107<H>  3.9   |  26  |  0.82    Ca    9.2      04 Mar 2021 10:39    TPro  7.6  /  Alb  3.2<L>  /  TBili  0.5  /  DBili  x   /  AST  32  /  ALT  93<H>  /  AlkPhos  115  03-04              Blood Culture:

## 2021-03-04 NOTE — ED ADULT TRIAGE NOTE - CHIEF COMPLAINT QUOTE
sent in from doctor's office for low oxygen level, O2 sat in triage 86% on RA, pulse 88, c/o shortness of breath, low grade fever, body aches and diarrhea,  patient is covid positive 8 days ago, patient she is not sure when symptoms started, HX: HTN, breast cancer,  left lumpectomy 10 yrs ago, boderline diabetic

## 2021-03-04 NOTE — H&P ADULT - NSHPPHYSICALEXAM_GEN_ALL_CORE
ICU Vital Signs Last 24 Hrs  T(C): 36.8 (04 Mar 2021 10:19), Max: 36.8 (04 Mar 2021 10:19)  T(F): 98.2 (04 Mar 2021 10:19), Max: 98.2 (04 Mar 2021 10:19)  HR: 78 (04 Mar 2021 10:19) (78 - 78)  BP: 101/82 (04 Mar 2021 10:19) (101/82 - 101/82)  BP(mean): 86 (04 Mar 2021 10:19) (86 - 86)  ABP: --  ABP(mean): --  RR: 20 (04 Mar 2021 10:19) (20 - 20)  SpO2: 91% (04 Mar 2021 10:19) (91% - 91%)      PHYSICAL EXAM:    Constitutional: NAD, awake and alert, well-developed  HEENT: PERR, EOMI, Normal Hearing, MMM  Neck: Soft and supple, No LAD, No JVD  Respiratory: decreased BS b/l  Cardiovascular: S1 and S2, regular rate and rhythm, no Murmurs, gallops or rubs  Gastrointestinal: Bowel Sounds present, soft, nontender, nondistended, no guarding, no rebound  Extremities: No peripheral edema  Vascular: 2+ peripheral pulses  Neurological: A/O x 3, no focal deficits  Musculoskeletal: 5/5 strength b/l upper and lower extremities  Skin: No rashes

## 2021-03-04 NOTE — H&P ADULT - ASSESSMENT
A:  Acute hypoxemic respiratory failure 2/2 to interstitial pneumonia secondary to COVID-19      P:  - Admit to ms  - Pulseox Q8h  - O2 supplemental and maintain SpO2 between 88-94%  - if requiring >8L NC-> switch to 100% NRB or HFNC  - IF still not responding to above or having increased work of breathing-> trial of bipap in negative pressure isolation room  - Start remdesivir/decadron. id consult  - obtain COVID abx  - Obtain baseline and inflammatory markers. Monitor Q72hrs  - Limit use of NSAIDs  - Albuterol MDI to limit aerosolization  - DVT px: COVID patients to be start on LMWH as per recent data supporting hypercoagubale state especially with high dimers with no absolute CI to its use ie GI bleed or other stigmata of bleeding within last 3 months or PLTs < 50    Full code

## 2021-03-04 NOTE — ED ADULT NURSE NOTE - OBJECTIVE STATEMENT
pt. c/o SOB and diarrhea secondary to recent diagnosis to COVID, pt. denies fever, chest pain, dizziness, numbness, weakness, n/v.

## 2021-03-04 NOTE — PHARMACOTHERAPY INTERVENTION NOTE - COMMENTS
Completed medication history with patient and Dr First Chaudharicash. Per Dr Caldwell, patient was prescribed duloxetine DR 30mg PO QD, but stated she has been taking one capsule every 4 days over the past year. All medication-related questions were addressed.

## 2021-03-05 LAB
ALBUMIN SERPL ELPH-MCNC: 2.9 G/DL — LOW (ref 3.3–5)
ALP SERPL-CCNC: 102 U/L — SIGNIFICANT CHANGE UP (ref 40–120)
ALT FLD-CCNC: 80 U/L — HIGH (ref 12–78)
AST SERPL-CCNC: 29 U/L — SIGNIFICANT CHANGE UP (ref 15–37)
BILIRUB DIRECT SERPL-MCNC: 0.1 MG/DL — SIGNIFICANT CHANGE UP (ref 0–0.2)
BILIRUB INDIRECT FLD-MCNC: 0.3 MG/DL — SIGNIFICANT CHANGE UP (ref 0.2–1)
BILIRUB SERPL-MCNC: 0.4 MG/DL — SIGNIFICANT CHANGE UP (ref 0.2–1.2)
CREAT SERPL-MCNC: 0.72 MG/DL — SIGNIFICANT CHANGE UP (ref 0.5–1.3)
HCV AB S/CO SERPL IA: 0.12 S/CO — SIGNIFICANT CHANGE UP (ref 0–0.99)
HCV AB SERPL-IMP: SIGNIFICANT CHANGE UP
PROT SERPL-MCNC: 7 GM/DL — SIGNIFICANT CHANGE UP (ref 6–8.3)

## 2021-03-05 PROCEDURE — 99232 SBSQ HOSP IP/OBS MODERATE 35: CPT

## 2021-03-05 RX ORDER — REMDESIVIR 5 MG/ML
100 INJECTION INTRAVENOUS EVERY 24 HOURS
Refills: 0 | Status: COMPLETED | OUTPATIENT
Start: 2021-03-05 | End: 2021-03-08

## 2021-03-05 RX ADMIN — REMDESIVIR 540 MILLIGRAM(S): 5 INJECTION INTRAVENOUS at 09:55

## 2021-03-05 RX ADMIN — SODIUM CHLORIDE 100 MILLILITER(S): 9 INJECTION INTRAMUSCULAR; INTRAVENOUS; SUBCUTANEOUS at 09:12

## 2021-03-05 RX ADMIN — Medication 100 MILLIGRAM(S): at 21:25

## 2021-03-05 RX ADMIN — Medication 650 MILLIGRAM(S): at 12:27

## 2021-03-05 RX ADMIN — ENOXAPARIN SODIUM 40 MILLIGRAM(S): 100 INJECTION SUBCUTANEOUS at 21:26

## 2021-03-05 RX ADMIN — Medication 650 MILLIGRAM(S): at 17:11

## 2021-03-05 RX ADMIN — ENOXAPARIN SODIUM 40 MILLIGRAM(S): 100 INJECTION SUBCUTANEOUS at 09:12

## 2021-03-05 RX ADMIN — Medication 100 MILLIGRAM(S): at 12:27

## 2021-03-05 RX ADMIN — Medication 6 MILLIGRAM(S): at 09:11

## 2021-03-05 NOTE — CONSULT NOTE ADULT - ASSESSMENT
64 y/o F with a PMHx of HTN, HLD, Breast CA 10 years in remission, presents to the ED c/o cough, SOB. Pt went to [facility] on 02/24 and was tested + for COVID.  Symptoms began with subjective fevers, headache, dry cough and sob. Was seen by her pcp for consideration of monoclonal antibodies and was referred to ER. On presentation she was hypoxic to 86% on RA corrected to 94% on 2L. CXR c/w bilateral infiltrates. Was started on decadron/remdesivir.     1. acute respiratory failure. covid-19 viral syndrome. multifocal pneumonia  - agree with remdesivir per protocol monitor renal/hepatic function closely   - on decadron 6mg daily #2  - continue with antiviral + steroids  - isolation precautions  - fu cbc  - monitor temps  - tolerating abx well so far; no side effects noted  - reason for abx use and side effects reviewed with patient  - supportive care    2. other issues - care per medicine

## 2021-03-06 LAB
ALBUMIN SERPL ELPH-MCNC: 2.8 G/DL — LOW (ref 3.3–5)
ALP SERPL-CCNC: 97 U/L — SIGNIFICANT CHANGE UP (ref 40–120)
ALT FLD-CCNC: 66 U/L — SIGNIFICANT CHANGE UP (ref 12–78)
AST SERPL-CCNC: 25 U/L — SIGNIFICANT CHANGE UP (ref 15–37)
BILIRUB DIRECT SERPL-MCNC: 0.1 MG/DL — SIGNIFICANT CHANGE UP (ref 0–0.2)
BILIRUB INDIRECT FLD-MCNC: 0.3 MG/DL — SIGNIFICANT CHANGE UP (ref 0.2–1)
BILIRUB SERPL-MCNC: 0.4 MG/DL — SIGNIFICANT CHANGE UP (ref 0.2–1.2)
CREAT SERPL-MCNC: 0.73 MG/DL — SIGNIFICANT CHANGE UP (ref 0.5–1.3)
INR BLD: 1.2 RATIO — HIGH (ref 0.88–1.16)
PROCALCITONIN SERPL-MCNC: 0.13 NG/ML — HIGH (ref 0.02–0.1)
PROT SERPL-MCNC: 6.8 GM/DL — SIGNIFICANT CHANGE UP (ref 6–8.3)
PROTHROM AB SERPL-ACNC: 13.8 SEC — HIGH (ref 10.6–13.6)

## 2021-03-06 PROCEDURE — 99232 SBSQ HOSP IP/OBS MODERATE 35: CPT

## 2021-03-06 RX ADMIN — REMDESIVIR 540 MILLIGRAM(S): 5 INJECTION INTRAVENOUS at 11:07

## 2021-03-06 RX ADMIN — Medication 100 MILLIGRAM(S): at 21:35

## 2021-03-06 RX ADMIN — ENOXAPARIN SODIUM 40 MILLIGRAM(S): 100 INJECTION SUBCUTANEOUS at 11:07

## 2021-03-06 RX ADMIN — Medication 6 MILLIGRAM(S): at 11:07

## 2021-03-06 RX ADMIN — Medication 3 MILLIGRAM(S): at 21:35

## 2021-03-06 RX ADMIN — Medication 100 MILLIGRAM(S): at 08:25

## 2021-03-06 RX ADMIN — ENOXAPARIN SODIUM 40 MILLIGRAM(S): 100 INJECTION SUBCUTANEOUS at 21:34

## 2021-03-07 LAB
ALBUMIN SERPL ELPH-MCNC: 3.2 G/DL — LOW (ref 3.3–5)
ALP SERPL-CCNC: 96 U/L — SIGNIFICANT CHANGE UP (ref 40–120)
ALT FLD-CCNC: 68 U/L — SIGNIFICANT CHANGE UP (ref 12–78)
AST SERPL-CCNC: 22 U/L — SIGNIFICANT CHANGE UP (ref 15–37)
BILIRUB DIRECT SERPL-MCNC: 0.1 MG/DL — SIGNIFICANT CHANGE UP (ref 0–0.2)
BILIRUB INDIRECT FLD-MCNC: 0.3 MG/DL — SIGNIFICANT CHANGE UP (ref 0.2–1)
BILIRUB SERPL-MCNC: 0.4 MG/DL — SIGNIFICANT CHANGE UP (ref 0.2–1.2)
CREAT SERPL-MCNC: 0.74 MG/DL — SIGNIFICANT CHANGE UP (ref 0.5–1.3)
INR BLD: 1.15 RATIO — SIGNIFICANT CHANGE UP (ref 0.88–1.16)
PROT SERPL-MCNC: 7 GM/DL — SIGNIFICANT CHANGE UP (ref 6–8.3)
PROTHROM AB SERPL-ACNC: 13.4 SEC — SIGNIFICANT CHANGE UP (ref 10.6–13.6)

## 2021-03-07 PROCEDURE — 99232 SBSQ HOSP IP/OBS MODERATE 35: CPT

## 2021-03-07 RX ADMIN — Medication 6 MILLIGRAM(S): at 08:55

## 2021-03-07 RX ADMIN — Medication 100 MILLIGRAM(S): at 22:16

## 2021-03-07 RX ADMIN — ENOXAPARIN SODIUM 40 MILLIGRAM(S): 100 INJECTION SUBCUTANEOUS at 22:16

## 2021-03-07 RX ADMIN — Medication 3 MILLIGRAM(S): at 22:17

## 2021-03-07 RX ADMIN — REMDESIVIR 540 MILLIGRAM(S): 5 INJECTION INTRAVENOUS at 08:54

## 2021-03-07 RX ADMIN — ENOXAPARIN SODIUM 40 MILLIGRAM(S): 100 INJECTION SUBCUTANEOUS at 08:54

## 2021-03-08 ENCOUNTER — TRANSCRIPTION ENCOUNTER (OUTPATIENT)
Age: 66
End: 2021-03-08

## 2021-03-08 LAB
ADD ON TEST-SPECIMEN IN LAB: SIGNIFICANT CHANGE UP
ALBUMIN SERPL ELPH-MCNC: 2.9 G/DL — LOW (ref 3.3–5)
ALP SERPL-CCNC: 93 U/L — SIGNIFICANT CHANGE UP (ref 40–120)
ALT FLD-CCNC: 58 U/L — SIGNIFICANT CHANGE UP (ref 12–78)
AST SERPL-CCNC: 20 U/L — SIGNIFICANT CHANGE UP (ref 15–37)
BILIRUB DIRECT SERPL-MCNC: 0.1 MG/DL — SIGNIFICANT CHANGE UP (ref 0–0.2)
BILIRUB INDIRECT FLD-MCNC: 0.4 MG/DL — SIGNIFICANT CHANGE UP (ref 0.2–1)
BILIRUB SERPL-MCNC: 0.5 MG/DL — SIGNIFICANT CHANGE UP (ref 0.2–1.2)
CREAT SERPL-MCNC: 0.71 MG/DL — SIGNIFICANT CHANGE UP (ref 0.5–1.3)
D DIMER BLD IA.RAPID-MCNC: <150 NG/ML DDU — SIGNIFICANT CHANGE UP
FERRITIN SERPL-MCNC: 839 NG/ML — HIGH (ref 15–150)
INR BLD: 1.17 RATIO — HIGH (ref 0.88–1.16)
PROCALCITONIN SERPL-MCNC: 0.03 NG/ML — SIGNIFICANT CHANGE UP (ref 0.02–0.1)
PROT SERPL-MCNC: 6.8 GM/DL — SIGNIFICANT CHANGE UP (ref 6–8.3)
PROTHROM AB SERPL-ACNC: 13.5 SEC — SIGNIFICANT CHANGE UP (ref 10.6–13.6)

## 2021-03-08 PROCEDURE — 99232 SBSQ HOSP IP/OBS MODERATE 35: CPT

## 2021-03-08 RX ORDER — AZITHROMYCIN 500 MG/1
0 TABLET, FILM COATED ORAL
Qty: 0 | Refills: 0 | DISCHARGE

## 2021-03-08 RX ORDER — ASPIRIN/CALCIUM CARB/MAGNESIUM 324 MG
1 TABLET ORAL
Qty: 30 | Refills: 0
Start: 2021-03-08 | End: 2021-04-06

## 2021-03-08 RX ADMIN — ENOXAPARIN SODIUM 40 MILLIGRAM(S): 100 INJECTION SUBCUTANEOUS at 21:48

## 2021-03-08 RX ADMIN — ENOXAPARIN SODIUM 40 MILLIGRAM(S): 100 INJECTION SUBCUTANEOUS at 09:44

## 2021-03-08 RX ADMIN — Medication 6 MILLIGRAM(S): at 09:45

## 2021-03-08 RX ADMIN — REMDESIVIR 540 MILLIGRAM(S): 5 INJECTION INTRAVENOUS at 09:44

## 2021-03-08 NOTE — DISCHARGE NOTE PROVIDER - NSDCCPCAREPLAN_GEN_ALL_CORE_FT
PRINCIPAL DISCHARGE DIAGNOSIS  Diagnosis: COVID-19  Assessment and Plan of Treatment: You were treated for COVID19 while admitted. You received decadron (steroid) and remdesivir (antiviral) while admitted. You should take a baby ASA (81mg) for one month after discharge to prevent blood clots. You are being discharged with home oxygen.      SECONDARY DISCHARGE DIAGNOSES  Diagnosis: Hypoxia  Assessment and Plan of Treatment:

## 2021-03-08 NOTE — PROGRESS NOTE ADULT - PROVIDER SPECIALTY LIST ADULT
Hospitalist
Hospitalist
Infectious Disease
Infectious Disease
Hospitalist
Infectious Disease
Hospitalist

## 2021-03-08 NOTE — DISCHARGE NOTE PROVIDER - CARE PROVIDER_API CALL
Nicole Rojas  [facility]  [address], [address]  [place] [address]  Phone: [phone]  Fax: [phone]  Follow Up Time: 1 week

## 2021-03-08 NOTE — CHART NOTE - NSCHARTNOTEFT_GEN_A_CORE
HOME O2 EVALUATION    Pulse Ox Room Air Rest:93%    Pulse Ox Room Air Ambulatin%    Pulse Ox on O2      4    L Ambulatin%    Pulse Ox Post Ambulation:

## 2021-03-08 NOTE — DISCHARGE NOTE NURSING/CASE MANAGEMENT/SOCIAL WORK - PATIENT PORTAL LINK FT
You can access the FollowMyHealth Patient Portal offered by [facility] by registering at the following website: [email or web address]. By joining [employer]’s FollowMyHealth portal, you will also be able to view your health information using other applications (apps) compatible with our system.

## 2021-03-08 NOTE — PROGRESS NOTE ADULT - SUBJECTIVE AND OBJECTIVE BOX
HOSPITALIST PROGRESS NOTE:  SUBJECTIVE:  PCP:  Chief Complaint: Patient is a 65y old  Female who presents with a chief complaint of sob (05 Mar 2021 11:10)      HPI:  64 y/o F with a PMHx of HTN, HLD, Breast CA 10 years in remission, presents to the ED c/o cough, SOB. Pt went to [facility] on 02/24 and was tested + for COVID.  Symptoms began with subjective fevers, headeache, dry cough and sob. Was seen by her pcp for consideration of monoclonal antibodies and was referred to ER. On presentation she was hypoxic to 86% on RA corrected to 94% on 2L. CXR c/w bilateral infiltrates.     3/5:  Above reviewed; patient has no complaints; feeling better; No CP; saturating well    Allergies:  No Known Allergies    REVIEW OF SYSTEMS:  See HPI. All other review of systems is negative unless indicated above.     OBJECTIVE  Physical Exam:  Vital Signs:    Vital Signs Last 24 Hrs  T(C): 37.2 (05 Mar 2021 08:32), Max: 37.3 (04 Mar 2021 23:53)  T(F): 98.9 (05 Mar 2021 08:32), Max: 99.1 (04 Mar 2021 23:53)  HR: 72 (05 Mar 2021 08:32) (72 - 72)  BP: 105/63 (05 Mar 2021 08:32) (105/63 - 105/65)  BP(mean): --  RR: 19 (05 Mar 2021 08:32) (18 - 19)  SpO2: 93% (05 Mar 2021 08:32) (92% - 93%)  I&O's Summary      Constitutional: NAD, awake and alert, well-developed  Neurological: AAO x 3, no focal deficits  HEENT: PERRLA, EOMI, MMM  Neck: Soft and supple, No LAD, No JVD  Respiratory: Breath sounds are clear bilaterally, No wheezing, rales or rhonchi  Cardiovascular: S1 and S2, regular rate and rhythm; no Murmurs, gallops or rubs  Gastrointestinal: Bowel Sounds present, soft, nontender, nondistended, no guarding, no rebound tenderness  Back: No CVA tenderness   Extremities: No peripheral edema  Vascular: 2+ peripheral pulses  Musculoskeletal: 5/5 strength b/l upper and lower extremities  Skin: No rashes  Breast: Deferred  Rectal: Deferred    MEDICATIONS  (STANDING):  dexAMETHasone  Injectable 6 milliGRAM(s) IV Push daily  enoxaparin Injectable 40 milliGRAM(s) SubCutaneous every 12 hours  influenza   Vaccine 0.5 milliLiter(s) IntraMuscular once  remdesivir  IVPB 100 milliGRAM(s) IV Intermittent every 24 hours      LABS: All Labs Reviewed:                        14.7   4.87  )-----------( 143      ( 04 Mar 2021 10:39 )             44.5     03-05    x   |  x   |  x   ----------------------------<  x   x    |  x   |  0.72    Ca    9.2      04 Mar 2021 10:39    TPro  7.0  /  Alb  2.9<L>  /  TBili  0.4  /  DBili  0.1  /  AST  29  /  ALT  80<H>  /  AlkPhos  102  03-05      RADIOLOGY/EKG:    < from: Xray Chest 1 View- PORTABLE-Urgent (03.04.21 @ 11:01) >    IMPRESSION: Bilateral infiltrates as above.      < end of copied text >          
HOSPITALIST ATTENDING PROGRESS NOTE    Chart and meds reviewed.  Patient seen and examined.    CC: SOB    Subjective:  3/8/21: Feels improved, wants to leave today, amenable to home O2.    All 10 systems reviewed and found to be negative with the exception of what has been described above.    MEDICATIONS  (STANDING):  dexAMETHasone  Injectable 6 milliGRAM(s) IV Push daily  enoxaparin Injectable 40 milliGRAM(s) SubCutaneous every 12 hours  influenza   Vaccine 0.5 milliLiter(s) IntraMuscular once    MEDICATIONS  (PRN):  acetaminophen   Tablet .. 650 milliGRAM(s) Oral every 4 hours PRN Temp greater or equal to 38.5C (101.3F)  ALBUTerol    90 MICROgram(s) HFA Inhaler 2 Puff(s) Inhalation every 4 hours PRN Shortness of Breath and/or Wheezing  benzonatate 100 milliGRAM(s) Oral three times a day PRN Cough  guaiFENesin   Syrup  (Sugar-Free) 100 milliGRAM(s) Oral every 6 hours PRN Cough  melatonin 3 milliGRAM(s) Oral at bedtime PRN Insomnia      VITALS:  T(F): 97.8 (03-08-21 @ 16:31), Max: 98.3 (03-07-21 @ 22:31)  HR: 109 (03-08-21 @ 16:31) (82 - 109)  BP: 126/86 (03-08-21 @ 16:31) (105/71 - 126/86)  RR: 18 (03-08-21 @ 16:31) (18 - 19)  SpO2: 93% (03-08-21 @ 16:31) (93% - 94%)  Wt(kg): --    I&O's Summary      CAPILLARY BLOOD GLUCOSE          PHYSICAL EXAM:    HEENT:  pupils equal and reactive, EOMI, no oropharyngeal lesions, erythema, exudates, oral thrush  NECK:   supple, no carotid bruits, no palpable lymph nodes, no thyromegaly  CV:  +S1, +S2, regular, no murmurs or rubs  RESP:   lungs clear to auscultation bilaterally, no wheezing, rales, rhonchi, good air entry bilaterally  BREAST:  not examined  GI:  abdomen soft, non-tender, non-distended, normal BS, no bruits, no abdominal masses, no palpable masses  RECTAL:  not examined  :  not examined  MSK:   normal muscle tone, no atrophy, no rigidity, no contractions  EXT:  no clubbing, no cyanosis, no edema, no calf pain, swelling or erythema  VASCULAR:  pulses equal and symmetric in the upper and lower extremities  NEURO:  AAOX3, no focal neurological deficits, follows all commands, able to move extremities spontaneously  SKIN:  no ulcers, lesions or rashes    LABS:        03-08    143  |  111<H>  |  22  ----------------------------<  82  4.2   |  26  |  0.71    Ca    9.5      08 Mar 2021 08:06    TPro  6.8  /  Alb  2.9<L>  /  TBili  0.5  /  DBili  0.1  /  AST  20  /  ALT  58  /  AlkPhos  93  03-08        LIVER FUNCTIONS - ( 08 Mar 2021 08:06 )  Alb: 2.9 g/dL / Pro: 6.8 gm/dL / ALK PHOS: 93 U/L / ALT: 58 U/L / AST: 20 U/L / GGT: x           PT/INR - ( 08 Mar 2021 08:06 )   PT: 13.5 sec;   INR: 1.17 ratio         CULTURES:  no new    Additional results/Imaging:  no new
  Date of service: 03-07-21 @ 13:02    pt seen and examined  sitting up in bed  has cough  has dyspnea  feels tired   no fevers    ROS: no fever or chills; denies dizziness, no HA, no abdominal pain, no diarrhea or constipation; no dysuria, no urinary frequency, no legs pain, no rashes  MEDICATIONS  (STANDING):  dexAMETHasone  Injectable 6 milliGRAM(s) IV Push daily  enoxaparin Injectable 40 milliGRAM(s) SubCutaneous every 12 hours  influenza   Vaccine 0.5 milliLiter(s) IntraMuscular once  remdesivir  IVPB 100 milliGRAM(s) IV Intermittent every 24 hours    Vital Signs Last 24 Hrs  T(C): 36.8 (07 Mar 2021 07:56), Max: 36.8 (07 Mar 2021 01:18)  T(F): 98.3 (07 Mar 2021 07:56), Max: 98.3 (07 Mar 2021 07:56)  HR: 89 (07 Mar 2021 07:56) (76 - 89)  BP: 123/75 (07 Mar 2021 07:56) (96/75 - 123/75)  BP(mean): --  RR: 20 (07 Mar 2021 07:56) (18 - 20)  SpO2: 91% (07 Mar 2021 07:56) (91% - 93%)    PE:  Constitutional: frail looking  HEENT: NC/AT, EOMI, PERRLA, conjunctivae clear; ears and nose atraumatic; pharynx benign  Neck: supple; thyroid not palpable  Back: no tenderness  Respiratory: decreased breath sounds   Cardiovascular: S1S2 regular, no murmurs  Abdomen: soft, not tender, not distended, positive BS; liver and spleen WNL  Genitourinary: no suprapubic tenderness  Lymphatic: no LN palpable  Musculoskeletal: no muscle tenderness, no joint swelling or tenderness  Extremities: no pedal edema  Neurological/ Psychiatric: moving all extremities  Skin: no rashes; no palpable lesions    Labs: all available labs reviewed               03-07    x   |  x   |  x   ----------------------------<  x   x    |  x   |  0.74      TPro  7.0  /  Alb  3.2<L>  /  TBili  0.4  /  DBili  0.1  /  AST  22  /  ALT  68  /  AlkPhos  96  03-07           C-Reactive Protein, Serum: 39 mg/L (03-04-21 @ 10:39)  D-Dimer Assay, Quantitative: 185 ng/mL DDU (03-04-21 @ 10:39)  Ferritin, Serum: 653 ng/mL (03-04-21 @ 10:39)      Radiology: all available radiological tests reviewed      EXAM:  XR CHEST PORTABLE URGENT 1V                            PROCEDURE DATE:  03/04/2021          INTERPRETATION:  AP chest on March 4, 2021 at 10:52 AM. Patient is short of breath with cough and fever.    Heart is magnified by technique.    Scattered mid lower lung field atelectatic infiltrates are new since March 14, 2015 and are consistent with Covid pneumonia.    IMPRESSION: Bilateral infiltrates as above.      < end of copied text >    Advanced directives addressed: full resuscitation
Date of service: 03-06-21 @ 12:55    pt seen and examined  sitting up in bed  on 3L NC  has dry cough  fatigue, weak  no fevers    ROS: no fever or chills; denies dizziness, no HA, no abdominal pain, no diarrhea or constipation; no dysuria, no urinary frequency, no legs pain, no rashes    MEDICATIONS  (STANDING):  dexAMETHasone  Injectable 6 milliGRAM(s) IV Push daily  enoxaparin Injectable 40 milliGRAM(s) SubCutaneous every 12 hours  influenza   Vaccine 0.5 milliLiter(s) IntraMuscular once  remdesivir  IVPB 100 milliGRAM(s) IV Intermittent every 24 hours    Vital Signs Last 24 Hrs  T(C): 36.4 (06 Mar 2021 07:19), Max: 36.7 (05 Mar 2021 23:36)  T(F): 97.6 (06 Mar 2021 07:19), Max: 98 (05 Mar 2021 23:36)  HR: 63 (06 Mar 2021 07:19) (63 - 75)  BP: 126/69 (06 Mar 2021 07:19) (101/77 - 126/69)  BP(mean): --  RR: 19 (06 Mar 2021 07:19) (18 - 19)  SpO2: 91% (06 Mar 2021 07:19) (91% - 95%)      PE:  Constitutional: frail looking  HEENT: NC/AT, EOMI, PERRLA, conjunctivae clear; ears and nose atraumatic; pharynx benign  Neck: supple; thyroid not palpable  Back: no tenderness  Respiratory: decreased breath sounds   Cardiovascular: S1S2 regular, no murmurs  Abdomen: soft, not tender, not distended, positive BS; liver and spleen WNL  Genitourinary: no suprapubic tenderness  Lymphatic: no LN palpable  Musculoskeletal: no muscle tenderness, no joint swelling or tenderness  Extremities: no pedal edema  Neurological/ Psychiatric: moving all extremities  Skin: no rashes; no palpable lesions    Labs: all available labs reviewed               03-06    x   |  x   |  x   ----------------------------<  x   x    |  x   |  0.73      TPro  6.8  /  Alb  2.8<L>  /  TBili  0.4  /  DBili  0.1  /  AST  25  /  ALT  66  /  AlkPhos  97  03-06            Radiology: all available radiological tests reviewed      EXAM:  XR CHEST PORTABLE URGENT 1V                            PROCEDURE DATE:  03/04/2021          INTERPRETATION:  AP chest on March 4, 2021 at 10:52 AM. Patient is short of breath with cough and fever.    Heart is magnified by technique.    Scattered mid lower lung field atelectatic infiltrates are new since March 14, 2015 and are consistent with Covid pneumonia.    IMPRESSION: Bilateral infiltrates as above.      < end of copied text >    Advanced directives addressed: full resuscitation
HOSPITALIST PROGRESS NOTE:  SUBJECTIVE:  PCP:  Chief Complaint: Patient is a 65y old  Female who presents with a chief complaint of sob (05 Mar 2021 11:10)      HPI:  66 y/o F with a PMHx of HTN, HLD, Breast CA 10 years in remission, presents to the ED c/o cough, SOB. Pt went to [facility] on 02/24 and was tested + for COVID.  Symptoms began with subjective fevers, headeache, dry cough and sob. Was seen by her pcp for consideration of monoclonal antibodies and was referred to ER. On presentation she was hypoxic to 86% on RA corrected to 94% on 2L. CXR c/w bilateral infiltrates.     3/5:  Above reviewed; patient has no complaints; feeling better; No CP; saturating well  3/6: feel short winded while walking, 91% on 3L     Allergies:  No Known Allergies    REVIEW OF SYSTEMS:  See HPI. All other review of systems is negative unless indicated above.     OBJECTIVE  Physical Exam:  Vital Signs:    ICU Vital Signs Last 24 Hrs  T(C): 36.4 (06 Mar 2021 07:19), Max: 36.7 (05 Mar 2021 23:36)  T(F): 97.6 (06 Mar 2021 07:19), Max: 98 (05 Mar 2021 23:36)  HR: 63 (06 Mar 2021 07:19) (63 - 75)  BP: 126/69 (06 Mar 2021 07:19) (101/77 - 126/69)  BP(mean): --  ABP: --  ABP(mean): --  RR: 19 (06 Mar 2021 07:19) (18 - 19)  SpO2: 91% (06 Mar 2021 07:19) (91% - 95%)    Constitutional: NAD, awake and alert, well-developed  Neurological: AAO x 3, no focal deficits  HEENT: PERRLA, EOMI, MMM  Neck: Soft and supple, No LAD, No JVD  Respiratory: Breath sounds are clear bilaterally, No wheezing, rales or rhonchi  Cardiovascular: S1 and S2, regular rate and rhythm; no Murmurs, gallops or rubs  Gastrointestinal: Bowel Sounds present, soft, nontender, nondistended, no guarding, no rebound tenderness  Back: No CVA tenderness   Extremities: No peripheral edema  Vascular: 2+ peripheral pulses  Musculoskeletal: 5/5 strength b/l upper and lower extremities  Skin: No rashes  Breast: Deferred  Rectal: Deferred    MEDICATIONS  (STANDING):  dexAMETHasone  Injectable 6 milliGRAM(s) IV Push daily  enoxaparin Injectable 40 milliGRAM(s) SubCutaneous every 12 hours  influenza   Vaccine 0.5 milliLiter(s) IntraMuscular once  remdesivir  IVPB 100 milliGRAM(s) IV Intermittent every 24 hours      LABS: All Labs Reviewed:                        14.7   4.87  )-----------( 143      ( 04 Mar 2021 10:39 )             44.5     03-05    x   |  x   |  x   ----------------------------<  x   x    |  x   |  0.72    Ca    9.2      04 Mar 2021 10:39    TPro  7.0  /  Alb  2.9<L>  /  TBili  0.4  /  DBili  0.1  /  AST  29  /  ALT  80<H>  /  AlkPhos  102  03-05      RADIOLOGY/EKG:    < from: Xray Chest 1 View- PORTABLE-Urgent (03.04.21 @ 11:01) >    IMPRESSION: Bilateral infiltrates as above.      < end of copied text >          
Date of service: 03-08-21 @ 11:53    pt seen and examined  sitting up in bed  has cough less dyspnea   no fevers    ROS: no fever or chills; denies dizziness, no HA, no abdominal pain, no diarrhea or constipation; no dysuria, no urinary frequency, no legs pain, no rashes    MEDICATIONS  (STANDING):  dexAMETHasone  Injectable 6 milliGRAM(s) IV Push daily  enoxaparin Injectable 40 milliGRAM(s) SubCutaneous every 12 hours  influenza   Vaccine 0.5 milliLiter(s) IntraMuscular once    Vital Signs Last 24 Hrs  T(C): 36.8 (08 Mar 2021 08:08), Max: 36.8 (07 Mar 2021 22:31)  T(F): 98.2 (08 Mar 2021 08:08), Max: 98.3 (07 Mar 2021 22:31)  HR: 88 (08 Mar 2021 08:08) (79 - 88)  BP: 105/71 (08 Mar 2021 08:08) (105/71 - 124/79)  BP(mean): --  RR: 18 (08 Mar 2021 08:08) (18 - 20)  SpO2: 94% (07 Mar 2021 22:31) (93% - 94%)    PE:  Constitutional: frail looking  HEENT: NC/AT, EOMI, PERRLA, conjunctivae clear; ears and nose atraumatic; pharynx benign  Neck: supple; thyroid not palpable  Back: no tenderness  Respiratory: decreased breath sounds   Cardiovascular: S1S2 regular, no murmurs  Abdomen: soft, not tender, not distended, positive BS; liver and spleen WNL  Genitourinary: no suprapubic tenderness  Lymphatic: no LN palpable  Musculoskeletal: no muscle tenderness, no joint swelling or tenderness  Extremities: no pedal edema  Neurological/ Psychiatric: moving all extremities  Skin: no rashes; no palpable lesions    Labs: all available labs reviewed               03-08    143  |  111<H>  |  22  ----------------------------<  82  4.2   |  26  |  0.71    Ca    9.5      08 Mar 2021 08:06    TPro  6.8  /  Alb  2.9<L>  /  TBili  0.5  /  DBili  0.1  /  AST  20  /  ALT  58  /  AlkPhos  93  03-08      C-Reactive Protein, Serum: 39 mg/L (03-04-21 @ 10:39)  D-Dimer Assay, Quantitative: 185 ng/mL DDU (03-04-21 @ 10:39)  Ferritin, Serum: 653 ng/mL (03-04-21 @ 10:39)      Radiology: all available radiological tests reviewed      EXAM:  XR CHEST PORTABLE URGENT 1V                            PROCEDURE DATE:  03/04/2021          INTERPRETATION:  AP chest on March 4, 2021 at 10:52 AM. Patient is short of breath with cough and fever.    Heart is magnified by technique.    Scattered mid lower lung field atelectatic infiltrates are new since March 14, 2015 and are consistent with Covid pneumonia.    IMPRESSION: Bilateral infiltrates as above.      < end of copied text >    Advanced directives addressed: full resuscitation
HOSPITALIST PROGRESS NOTE:  SUBJECTIVE:  PCP:  Chief Complaint: Patient is a 65y old  Female who presents with a chief complaint of sob (05 Mar 2021 11:10)      HPI:  66 y/o F with a PMHx of HTN, HLD, Breast CA 10 years in remission, presents to the ED c/o cough, SOB. Pt went to [facility] on 02/24 and was tested + for COVID.  Symptoms began with subjective fevers, headeache, dry cough and sob. Was seen by her pcp for consideration of monoclonal antibodies and was referred to ER. On presentation she was hypoxic to 86% on RA corrected to 94% on 2L. CXR c/w bilateral infiltrates.     3/5:  Above reviewed; patient has no complaints; feeling better; No CP; saturating well  3/6: feel short winded while walking, 91% on 3L   3/7: no change; on 3L, winded walking. fine at rest    Allergies:  No Known Allergies    REVIEW OF SYSTEMS:  See HPI. All other review of systems is negative unless indicated above.     OBJECTIVE  Physical Exam:  Vital Signs:    ICU Vital Signs Last 24 Hrs  T(C): 36.8 (07 Mar 2021 07:56), Max: 36.8 (07 Mar 2021 01:18)  T(F): 98.3 (07 Mar 2021 07:56), Max: 98.3 (07 Mar 2021 07:56)  HR: 89 (07 Mar 2021 07:56) (76 - 89)  BP: 123/75 (07 Mar 2021 07:56) (96/75 - 123/75)  BP(mean): --  ABP: --  ABP(mean): --  RR: 20 (07 Mar 2021 07:56) (18 - 20)  SpO2: 91% (07 Mar 2021 07:56) (91% - 93%)      Constitutional: NAD, awake and alert, well-developed  Neurological: AAO x 3, no focal deficits  HEENT: PERRLA, EOMI, MMM  Neck: Soft and supple, No LAD, No JVD  Respiratory: Breath sounds are clear bilaterally, No wheezing, rales or rhonchi  Cardiovascular: S1 and S2, regular rate and rhythm; no Murmurs, gallops or rubs  Gastrointestinal: Bowel Sounds present, soft, nontender, nondistended, no guarding, no rebound tenderness  Back: No CVA tenderness   Extremities: No peripheral edema  Vascular: 2+ peripheral pulses  Musculoskeletal: 5/5 strength b/l upper and lower extremities  Skin: No rashes  Breast: Deferred  Rectal: Deferred    MEDICATIONS  (STANDING):  dexAMETHasone  Injectable 6 milliGRAM(s) IV Push daily  enoxaparin Injectable 40 milliGRAM(s) SubCutaneous every 12 hours  influenza   Vaccine 0.5 milliLiter(s) IntraMuscular once  remdesivir  IVPB 100 milliGRAM(s) IV Intermittent every 24 hours      LABS: All Labs Reviewed:                        14.7   4.87  )-----------( 143      ( 04 Mar 2021 10:39 )             44.5     03-05    x   |  x   |  x   ----------------------------<  x   x    |  x   |  0.72    Ca    9.2      04 Mar 2021 10:39    TPro  7.0  /  Alb  2.9<L>  /  TBili  0.4  /  DBili  0.1  /  AST  29  /  ALT  80<H>  /  AlkPhos  102  03-05      RADIOLOGY/EKG:    < from: Xray Chest 1 View- PORTABLE-Urgent (03.04.21 @ 11:01) >    IMPRESSION: Bilateral infiltrates as above.      < end of copied text >

## 2021-03-08 NOTE — DISCHARGE NOTE PROVIDER - HOSPITAL COURSE
CC: hypoxia    HPI and Hospital Course:64 y/o F with a PMHx of HTN, HLD, Breast CA 10 years in remission, presents to the ED c/o cough, SOB. Pt went to [facility] on 02/24 and was tested + for COVID.  Symptoms began with subjective fevers, headeache, dry cough and sob. Was seen by her pcp for consideration of monoclonal antibodies and was referred to ER. On presentation she was hypoxic to 86% on RA corrected to 94% on 2L. CXR c/w bilateral infiltrates.     As inpatient, was treated with remdesivir, decadron and supplemental O2. On day of d/c, was deemed to be  candidate for home O2 based on ambulatory puls ox. ASA 81mg for 30d on d/c for VTE ppx.    See below for problem based plan.    VITALS:  T(F): 98.2 (03-08-21 @ 08:08), Max: 98.3 (03-07-21 @ 22:31)  HR: 88 (03-08-21 @ 08:08) (79 - 88)  BP: 105/71 (03-08-21 @ 08:08) (105/71 - 124/79)  RR: 18 (03-08-21 @ 08:08) (18 - 20)  SpO2: 94% (03-07-21 @ 22:31) (93% - 94%)    PHYSICAL EXAM:  General: NAD, lying in bed  HEENT:  pupils equal and reactive, EOMI, no oropharyngeal lesions, erythema, exudates, oral thrush  NECK:   supple, no carotid bruits, no palpable lymph nodes, no thyromegaly  CV:  +S1, +S2, regular, no murmurs or rubs  RESP:   lungs clear to auscultation bilaterally, no wheezing, rales, rhonchi, good air entry bilaterally  BREAST:  not examined  GI:  abdomen soft, non-tender, non-distended, normal BS, no bruits, no abdominal masses, no palpable masses  RECTAL:  not examined  :  not examined  MSK:   normal muscle tone, no atrophy, no rigidity, no contractions  EXT:  no clubbing, no cyanosis, no edema, no calf pain, swelling or erythema  VASCULAR:  pulses equal and symmetric in the upper and lower extremities  NEURO:  AAOX3, no focal neurological deficits, follows all commands, able to move extremities spontaneously  SKIN:  no ulcers, lesions or rashes    CXR 3/4/21: Scattered mid lower lung field atelectatic infiltrates are new since March 14, 2015 and are consistent with Covid pneumonia.    #Acute hypoxic respiratory failure 2/2 COVID19 PNA  -s/p Lovenox, decadron and remdesivir while inpatient  -d/c w ASA 81mg x 30d  -desaturated with ambulation, for home O2 to be set up prior to d/c  -ID consult while admitted    #HTN, HL- home meds    #f/u with PCP, Dr. Nicole Rojas   CC: hypoxia    HPI and Hospital Course:66 y/o F with a PMHx of HTN, HLD, Breast CA 10 years in remission, presents to the ED c/o cough, SOB. Pt went to [facility] on 02/24 and was tested + for COVID.  Symptoms began with subjective fevers, headeache, dry cough and sob. Was seen by her pcp for consideration of monoclonal antibodies and was referred to ER. On presentation she was hypoxic to 86% on RA corrected to 94% on 2L. CXR c/w bilateral infiltrates.     As inpatient, was treated with remdesivir, decadron and supplemental O2. On day of d/c, was deemed to be candidate for home O2 based on ambulatory puls ox. ASA 81mg for 30d on d/c for VTE ppx.    See below for problem based plan.    Vital Signs Last 24 Hrs  T(C): 35.9 (08 Mar 2021 23:47), Max: 36.6 (08 Mar 2021 16:31)  T(F): 96.6 (08 Mar 2021 23:47), Max: 97.8 (08 Mar 2021 16:31)  HR: 80 (08 Mar 2021 23:47) (80 - 109)  BP: 113/82 (08 Mar 2021 23:47) (113/82 - 126/86)  BP(mean): --  RR: 18 (08 Mar 2021 16:31) (18 - 18)  SpO2: 94% (08 Mar 2021 23:47) (93% - 94%)    PHYSICAL EXAM:  General: NAD, lying in bed  HEENT:  pupils equal and reactive, EOMI, no oropharyngeal lesions, erythema, exudates, oral thrush  NECK:   supple, no carotid bruits, no palpable lymph nodes, no thyromegaly  CV:  +S1, +S2, regular, no murmurs or rubs  RESP:   lungs clear to auscultation bilaterally, no wheezing, rales, rhonchi, good air entry bilaterally  BREAST:  not examined  GI:  abdomen soft, non-tender, non-distended, normal BS, no bruits, no abdominal masses, no palpable masses  RECTAL:  not examined  :  not examined  MSK:   normal muscle tone, no atrophy, no rigidity, no contractions  EXT:  no clubbing, no cyanosis, no edema, no calf pain, swelling or erythema  VASCULAR:  pulses equal and symmetric in the upper and lower extremities  NEURO:  AAOX3, no focal neurological deficits, follows all commands, able to move extremities spontaneously  SKIN:  no ulcers, lesions or rashes    CXR 3/4/21: Scattered mid lower lung field atelectatic infiltrates are new since March 14, 2015 and are consistent with Covid pneumonia.    #Acute hypoxic respiratory failure 2/2 COVID19 PNA  -s/p Lovenox, decadron and remdesivir while inpatient  -d/c w ASA 81mg x 30d  -desaturated with ambulation, for home O2 to be set up prior to d/c  -ID consult while admitted    #HTN, HL- home meds    #f/u with PCP, Dr. Nicole Rojas

## 2021-03-08 NOTE — DISCHARGE NOTE PROVIDER - NSDCMRMEDTOKEN_GEN_ALL_CORE_FT
aspirin 81 mg oral delayed release tablet: 1 tab(s) orally once a day   atenolol 25 mg oral tablet: 1 tab(s) orally once a day  atorvastatin 20 mg oral tablet: 1 tab(s) orally once a day  DULoxetine 30 mg oral delayed release capsule: 1 cap(s) orally once a day    **Patient states taking one 30mg DR capsule every 4 days for the past yaer**

## 2021-03-08 NOTE — PROGRESS NOTE ADULT - ASSESSMENT
66 y/o F with a PMHx of HTN, HLD, Breast CA 10 years in remission, presents to the ED c/o cough, SOB. Pt went to [facility] on 02/24 and was tested + for COVID.  Symptoms began with subjective fevers, headache, dry cough and sob. Was seen by her pcp for consideration of monoclonal antibodies and was referred to ER. On presentation she was hypoxic to 86% on RA corrected to 94% on 2L. CXR c/w bilateral infiltrates. Was started on decadron/remdesivir.     1. acute respiratory failure. covid-19 viral syndrome. multifocal pneumonia  - on remdesivir #3 per protocol monitor renal/hepatic function closely   - on decadron 6mg daily #3  - continue with antiviral + steroids  - isolation precautions  - fu cbc  - monitor temps  - tolerating abx well so far; no side effects noted  - reason for abx use and side effects reviewed with patient  - supportive care    2. other issues - care per medicine 
66 y/o F with a PMHx of HTN, HLD, Breast CA 10 years in remission, presents to the ED c/o cough, SOB. Pt went to [facility] on 02/24 and was tested + for COVID.  Symptoms began with subjective fevers, headeache, dry cough and sob. Was seen by her pcp for consideration of monoclonal antibodies and was referred to ER. On presentation she was hypoxic to 86% on RA corrected to 94% on 2L. CXR c/w bilateral infiltrates.     #Acute hypoxic respiratory failure 2/2 COVID19 PNA  -s/p Lovenox, decadron and remdesivir while inpatient  -d/c w ASA 81mg x 30d  -desaturated with ambulation, for home O2 to be set up prior to d/c  -ID consult while admitted    #HTN, HL- home meds    #f/u with PCP, Dr. Nicole Rojas
66 y/o F with a PMHx of HTN, HLD, Breast CA 10 years in remission, presents to the ED c/o cough, SOB. Pt went to [facility] on 02/24 and was tested + for COVID. Symptoms began with subjective fevers, headache, dry cough and sob. Was seen by her pcp for consideration of monoclonal antibodies and was referred to ER. On presentation she was hypoxic to 86% on RA corrected to 94% on 2L. CXR c/w bilateral infiltrates. Was started on decadron/remdesivir.     1. acute respiratory failure. covid-19 viral syndrome. multifocal pneumonia  - on remdesivir #4/5 per protocol monitor renal/hepatic function closely   - on decadron 6mg daily #4  - continue with antiviral + steroids  - isolation precautions  - fu cbc  - monitor temps  - tolerating abx well so far; no side effects noted  - reason for abx use and side effects reviewed with patient  - supportive care    2. other issues - care per medicine 
A:  Acute hypoxemic respiratory failure 2/2 to interstitial pneumonia secondary to COVID-19      P:  - 91% on 3L, raise o2 to 3L  - Pulseox Q8h  - O2 supplemental and maintain SpO2 between 88-94%  - if requiring >8L NC-> switch to 100% NRB or HFNC  - If still not responding to above or having increased work of breathing-> trial of bipap in negative pressure isolation room  - Start remdesivir/decadron#4  - id consult appreciated   - obtain COVID abx _ neg  - Obtain baseline and inflammatory markers. Monitor Q72hrs  - Limit use of NSAIDs  - Albuterol MDI to limit aerosolization  - DVT px: COVID patients to be start on LMWH as per recent data supporting hypercoagubale state especially with high dimers with no absolute CI to its use ie GI bleed or other stigmata of bleeding within last 3 months or PLTs < 50    Full code
A:  Acute hypoxemic respiratory failure 2/2 to interstitial pneumonia secondary to COVID-19      P:  - 91% on 3L, raise o2 to 4L  - Pulseox Q8h  - O2 supplemental and maintain SpO2 between 88-94%  - if requiring >8L NC-> switch to 100% NRB or HFNC  - IF still not responding to above or having increased work of breathing-> trial of bipap in negative pressure isolation room  - Start remdesivir/decadron#3  - id consult appreciated   - obtain COVID abx _ neg  - Obtain baseline and inflammatory markers. Monitor Q72hrs  - Limit use of NSAIDs  - Albuterol MDI to limit aerosolization  - DVT px: COVID patients to be start on LMWH as per recent data supporting hypercoagubale state especially with high dimers with no absolute CI to its use ie GI bleed or other stigmata of bleeding within last 3 months or PLTs < 50    Full code
64 y/o F with a PMHx of HTN, HLD, Breast CA 10 years in remission, presents to the ED c/o cough, SOB. Pt went to [facility] on 02/24 and was tested + for COVID. Symptoms began with subjective fevers, headache, dry cough and sob. Was seen by her pcp for consideration of monoclonal antibodies and was referred to ER. On presentation she was hypoxic to 86% on RA corrected to 94% on 2L. CXR c/w bilateral infiltrates. Was started on decadron/remdesivir.     1. acute respiratory failure. covid-19 viral syndrome. multifocal pneumonia  - on remdesivir #5/5 per protocol monitor renal/hepatic function closely   - on decadron 6mg daily #5  - slowly improving, completed course of remdesivir   - isolation precautions  - fu cbc  - monitor temps  - wean o2 as tolerated   - supportive care    2. other issues - care per medicine 
A:  Acute hypoxemic respiratory failure 2/2 to interstitial pneumonia secondary to COVID-19      P:  - 93% on 4 L  - Pulseox Q8h  - O2 supplemental and maintain SpO2 between 88-94%  - if requiring >8L NC-> switch to 100% NRB or HFNC  - IF still not responding to above or having increased work of breathing-> trial of bipap in negative pressure isolation room  - Start remdesivir/decadron#2.  - id consult appreciated   - obtain COVID abx _ neg  - Obtain baseline and inflammatory markers. Monitor Q72hrs  - Limit use of NSAIDs  - Albuterol MDI to limit aerosolization  - DVT px: COVID patients to be start on LMWH as per recent data supporting hypercoagubale state especially with high dimers with no absolute CI to its use ie GI bleed or other stigmata of bleeding within last 3 months or PLTs < 50    Full code

## 2021-03-09 VITALS
HEART RATE: 88 BPM | RESPIRATION RATE: 18 BRPM | DIASTOLIC BLOOD PRESSURE: 83 MMHG | OXYGEN SATURATION: 92 % | SYSTOLIC BLOOD PRESSURE: 111 MMHG | TEMPERATURE: 98 F

## 2021-03-09 PROCEDURE — 99238 HOSP IP/OBS DSCHRG MGMT 30/<: CPT

## 2021-03-09 RX ADMIN — ENOXAPARIN SODIUM 40 MILLIGRAM(S): 100 INJECTION SUBCUTANEOUS at 08:39

## 2021-03-09 RX ADMIN — Medication 6 MILLIGRAM(S): at 08:40

## 2021-03-15 DIAGNOSIS — Z23 NEED FOR VACCINATION: ICD-10-CM

## 2021-03-16 DIAGNOSIS — J12.82 PNEUMONIA DUE TO CORONAVIRUS DISEASE 2019: ICD-10-CM

## 2021-03-16 DIAGNOSIS — E78.5 HYPERLIPIDEMIA, UNSPECIFIED: ICD-10-CM

## 2021-03-16 DIAGNOSIS — I10 ESSENTIAL (PRIMARY) HYPERTENSION: ICD-10-CM

## 2021-03-16 DIAGNOSIS — U07.1 COVID-19: ICD-10-CM

## 2021-03-16 DIAGNOSIS — J96.01 ACUTE RESPIRATORY FAILURE WITH HYPOXIA: ICD-10-CM

## 2021-03-16 DIAGNOSIS — R19.7 DIARRHEA, UNSPECIFIED: ICD-10-CM

## 2021-03-16 DIAGNOSIS — Z85.3 PERSONAL HISTORY OF MALIGNANT NEOPLASM OF BREAST: ICD-10-CM

## 2021-03-30 ENCOUNTER — IMMUNIZATION (OUTPATIENT)
Dept: FAMILY PLANNING/WOMEN'S HEALTH CLINIC | Facility: IMMUNIZATION CENTER | Age: 66
End: 2021-03-30
Attending: INTERNAL MEDICINE
Payer: MEDICARE

## 2021-03-30 DIAGNOSIS — Z23 NEED FOR VACCINATION: ICD-10-CM

## 2021-03-30 DIAGNOSIS — Z23 ENCOUNTER FOR VACCINATION: Primary | ICD-10-CM

## 2021-03-30 PROCEDURE — 91300 PFIZER SARS-COV-2 VACCINE: CPT

## 2021-03-30 PROCEDURE — 0001A PFIZER SARS-COV-2 VACCINE: CPT

## 2021-04-22 ENCOUNTER — IMMUNIZATION (OUTPATIENT)
Dept: FAMILY PLANNING/WOMEN'S HEALTH CLINIC | Facility: IMMUNIZATION CENTER | Age: 66
End: 2021-04-22
Attending: INTERNAL MEDICINE
Payer: MEDICARE

## 2021-04-22 DIAGNOSIS — Z23 ENCOUNTER FOR VACCINATION: Primary | ICD-10-CM

## 2021-04-22 PROCEDURE — 0002A PFIZER SARS-COV-2 VACCINE: CPT | Performed by: INTERNAL MEDICINE

## 2021-04-22 PROCEDURE — 91300 PFIZER SARS-COV-2 VACCINE: CPT | Performed by: INTERNAL MEDICINE

## 2021-04-30 ENCOUNTER — HOSPITAL ENCOUNTER (OUTPATIENT)
Dept: LAB | Facility: MEDICAL CENTER | Age: 66
End: 2021-04-30
Attending: INTERNAL MEDICINE
Payer: MEDICARE

## 2021-04-30 LAB
ALBUMIN SERPL BCP-MCNC: 4 G/DL (ref 3.2–4.9)
ALBUMIN/GLOB SERPL: 1.2 G/DL
ALP SERPL-CCNC: 130 U/L (ref 30–99)
ALT SERPL-CCNC: 30 U/L (ref 2–50)
ANION GAP SERPL CALC-SCNC: 10 MMOL/L (ref 7–16)
AST SERPL-CCNC: 22 U/L (ref 12–45)
BASOPHILS # BLD AUTO: 0.4 % (ref 0–1.8)
BASOPHILS # BLD: 0.04 K/UL (ref 0–0.12)
BILIRUB SERPL-MCNC: 0.4 MG/DL (ref 0.1–1.5)
BUN SERPL-MCNC: 9 MG/DL (ref 8–22)
CALCIUM SERPL-MCNC: 9.2 MG/DL (ref 8.5–10.5)
CHLORIDE SERPL-SCNC: 102 MMOL/L (ref 96–112)
CHOLEST SERPL-MCNC: 142 MG/DL (ref 100–199)
CO2 SERPL-SCNC: 24 MMOL/L (ref 20–33)
CREAT SERPL-MCNC: 0.73 MG/DL (ref 0.5–1.4)
EOSINOPHIL # BLD AUTO: 0.08 K/UL (ref 0–0.51)
EOSINOPHIL NFR BLD: 0.8 % (ref 0–6.9)
ERYTHROCYTE [DISTWIDTH] IN BLOOD BY AUTOMATED COUNT: 53.2 FL (ref 35.9–50)
GLOBULIN SER CALC-MCNC: 3.4 G/DL (ref 1.9–3.5)
GLUCOSE SERPL-MCNC: 95 MG/DL (ref 65–99)
HCT VFR BLD AUTO: 43.1 % (ref 37–47)
HDLC SERPL-MCNC: 32 MG/DL
HGB BLD-MCNC: 13.7 G/DL (ref 12–16)
IMM GRANULOCYTES # BLD AUTO: 0.03 K/UL (ref 0–0.11)
IMM GRANULOCYTES NFR BLD AUTO: 0.3 % (ref 0–0.9)
LDLC SERPL CALC-MCNC: 85 MG/DL
LYMPHOCYTES # BLD AUTO: 3.13 K/UL (ref 1–4.8)
LYMPHOCYTES NFR BLD: 29.6 % (ref 22–41)
MCH RBC QN AUTO: 29.7 PG (ref 27–33)
MCHC RBC AUTO-ENTMCNC: 31.8 G/DL (ref 33.6–35)
MCV RBC AUTO: 93.5 FL (ref 81.4–97.8)
MONOCYTES # BLD AUTO: 0.47 K/UL (ref 0–0.85)
MONOCYTES NFR BLD AUTO: 4.5 % (ref 0–13.4)
NEUTROPHILS # BLD AUTO: 6.81 K/UL (ref 2–7.15)
NEUTROPHILS NFR BLD: 64.4 % (ref 44–72)
NRBC # BLD AUTO: 0 K/UL
NRBC BLD-RTO: 0 /100 WBC
PLATELET # BLD AUTO: 368 K/UL (ref 164–446)
PMV BLD AUTO: 10.8 FL (ref 9–12.9)
POTASSIUM SERPL-SCNC: 4.1 MMOL/L (ref 3.6–5.5)
PROT SERPL-MCNC: 7.4 G/DL (ref 6–8.2)
RBC # BLD AUTO: 4.61 M/UL (ref 4.2–5.4)
SODIUM SERPL-SCNC: 136 MMOL/L (ref 135–145)
TRIGL SERPL-MCNC: 125 MG/DL (ref 0–149)
TSH SERPL DL<=0.005 MIU/L-ACNC: 0.8 UIU/ML (ref 0.38–5.33)
WBC # BLD AUTO: 10.6 K/UL (ref 4.8–10.8)

## 2021-04-30 PROCEDURE — 36415 COLL VENOUS BLD VENIPUNCTURE: CPT | Mod: GA

## 2021-04-30 PROCEDURE — 84443 ASSAY THYROID STIM HORMONE: CPT

## 2021-04-30 PROCEDURE — 80061 LIPID PANEL: CPT

## 2021-04-30 PROCEDURE — 80053 COMPREHEN METABOLIC PANEL: CPT

## 2021-04-30 PROCEDURE — 82570 ASSAY OF URINE CREATININE: CPT

## 2021-04-30 PROCEDURE — 84439 ASSAY OF FREE THYROXINE: CPT

## 2021-04-30 PROCEDURE — 84156 ASSAY OF PROTEIN URINE: CPT

## 2021-04-30 PROCEDURE — 85025 COMPLETE CBC W/AUTO DIFF WBC: CPT

## 2021-04-30 PROCEDURE — 82306 VITAMIN D 25 HYDROXY: CPT

## 2021-04-30 PROCEDURE — 83036 HEMOGLOBIN GLYCOSYLATED A1C: CPT | Mod: GA

## 2021-05-01 LAB
CREAT UR-MCNC: 238.88 MG/DL
EST. AVERAGE GLUCOSE BLD GHB EST-MCNC: 131 MG/DL
HBA1C MFR BLD: 6.2 % (ref 4–5.6)
PROT UR-MCNC: 17 MG/DL (ref 0–15)
PROT/CREAT UR: 71 MG/G (ref 10–107)

## 2021-05-02 LAB — 25(OH)D3 SERPL-MCNC: 36 NG/ML (ref 30–80)

## 2021-05-04 LAB — T4 FREE SERPL DIALY-MCNC: 1.9 NG/DL (ref 1.1–2.4)

## 2021-05-06 ENCOUNTER — TELEMEDICINE (OUTPATIENT)
Dept: BEHAVIORAL HEALTH | Facility: CLINIC | Age: 66
End: 2021-05-06
Payer: MEDICARE

## 2021-05-06 VITALS — WEIGHT: 200 LBS | HEIGHT: 64 IN | BODY MASS INDEX: 34.15 KG/M2

## 2021-05-06 DIAGNOSIS — F90.0 ADHD (ATTENTION DEFICIT HYPERACTIVITY DISORDER), INATTENTIVE TYPE: ICD-10-CM

## 2021-05-06 DIAGNOSIS — F33.42 MDD (MAJOR DEPRESSIVE DISORDER), RECURRENT, IN FULL REMISSION (HCC): ICD-10-CM

## 2021-05-06 PROCEDURE — 99214 OFFICE O/P EST MOD 30 MIN: CPT | Mod: 95 | Performed by: PSYCHIATRY & NEUROLOGY

## 2021-05-06 RX ORDER — DEXTROAMPHETAMINE SACCHARATE, AMPHETAMINE ASPARTATE, DEXTROAMPHETAMINE SULFATE AND AMPHETAMINE SULFATE 3.75; 3.75; 3.75; 3.75 MG/1; MG/1; MG/1; MG/1
15 TABLET ORAL 3 TIMES DAILY
Qty: 90 TABLET | Refills: 0 | Status: SHIPPED | OUTPATIENT
Start: 2021-07-10 | End: 2021-08-09

## 2021-05-06 RX ORDER — ARIPIPRAZOLE 10 MG/1
10 TABLET ORAL DAILY
Qty: 90 TABLET | Refills: 0 | Status: SHIPPED | OUTPATIENT
Start: 2021-05-06 | End: 2021-05-17 | Stop reason: SDUPTHER

## 2021-05-06 RX ORDER — DEXTROAMPHETAMINE SACCHARATE, AMPHETAMINE ASPARTATE, DEXTROAMPHETAMINE SULFATE AND AMPHETAMINE SULFATE 3.75; 3.75; 3.75; 3.75 MG/1; MG/1; MG/1; MG/1
15 TABLET ORAL 3 TIMES DAILY
Qty: 90 TABLET | Refills: 0 | Status: SHIPPED | OUTPATIENT
Start: 2021-05-13 | End: 2021-06-12

## 2021-05-06 RX ORDER — VALACYCLOVIR HYDROCHLORIDE 1 G/1
1000 TABLET, FILM COATED ORAL
COMMUNITY

## 2021-05-06 RX ORDER — FLUTICASONE PROPIONATE 50 MCG
1-2 SPRAY, SUSPENSION (ML) NASAL
COMMUNITY

## 2021-05-06 RX ORDER — OXYBUTYNIN CHLORIDE 15 MG/1
15 TABLET, EXTENDED RELEASE ORAL DAILY
COMMUNITY

## 2021-05-06 RX ORDER — DEXTROAMPHETAMINE SACCHARATE, AMPHETAMINE ASPARTATE, DEXTROAMPHETAMINE SULFATE AND AMPHETAMINE SULFATE 3.75; 3.75; 3.75; 3.75 MG/1; MG/1; MG/1; MG/1
15 TABLET ORAL 3 TIMES DAILY
Qty: 90 TABLET | Refills: 0 | Status: SHIPPED | OUTPATIENT
Start: 2021-06-11 | End: 2021-07-11

## 2021-05-06 RX ORDER — VENLAFAXINE HYDROCHLORIDE 150 MG/1
150 CAPSULE, EXTENDED RELEASE ORAL DAILY
Qty: 90 CAPSULE | Refills: 0 | Status: SHIPPED | OUTPATIENT
Start: 2021-05-06

## 2021-05-06 ASSESSMENT — FIBROSIS 4 INDEX: FIB4 SCORE: 0.7

## 2021-05-06 NOTE — PROGRESS NOTES
PSYCHIATRY VIRTUAL VISIT FOLLOW-UP NOTE      Chief Complaint   Patient presents with   • Follow-Up     depression, ADHD       This evaluation was conducted via Zoom using secure and encrypted videoconferencing technology. The patient was in a private location in the [facility].    The patient's identity was confirmed and verbal consent was obtained for this virtual visit.    History Of Present Illness:  Alesia Cesar is a 66 y.o. female with major depressive disorder, ADHD, dyslipidemia, fibromyalgia, hypothyroidism, diabetes mellitus type 2, essential tremor comes in today for follow up, was last seen 3 months ago.  She is doing good in regards to her depression and ADHD since her last visit with me.  She decided to adopt a cat and that has been really good for her mental health.  She denies any new psychosocial stressors.  She has been compliant with all of her psychiatric medications and is endorsing benefit.  She is doing good in regards to her sleep and appetite.  She denies having thoughts of wanting to hurt herself.    Social History:   She is single,  and  x1, lives alone in [place], 4 siblings - 3 sisters and brother, on disability for psychiatric illness.    Substance Use:  Alcohol - Denies  Nicotine - Smokes 1 PPD  Cannabis - Recreational cannabis use, may be once a month with a friend   Illicit drugs - Denies     Past Medication Trials:  Prozac, Paxil, Zoloft, Celexa, Lexapro, Wellbutrin, Cymbalta, Adderall XR, Propranolol     Medications:  Current Outpatient Medications   Medication Sig Dispense Refill   • fluticasone (FLONASE) 50 MCG/ACT nasal spray      • oxybutynin (DITROPAN XL) 15 MG CR tablet      • valacyclovir (VALTREX) 1 GM Tab      • [START ON 7/10/2021] amphetamine-dextroamphetamine (ADDERALL) 15 MG tablet Take 1 tablet by mouth 3 times a day for 30 days. 90 tablet 0   • [START ON 6/11/2021] amphetamine-dextroamphetamine (ADDERALL) 15 MG tablet Take 1 tablet by mouth 3 times a  "day for 30 days. 90 tablet 0   • [START ON 5/13/2021] amphetamine-dextroamphetamine (ADDERALL) 15 MG tablet Take 1 tablet by mouth 3 times a day for 30 days. 90 tablet 0   • venlafaxine (EFFEXOR-XR) 150 MG extended-release capsule Take 1 capsule by mouth every day. 90 capsule 0   • ARIPiprazole (ABILIFY) 10 MG Tab Take 1 tablet by mouth every day. 90 tablet 0   • amphetamine-dextroamphetamine (ADDERALL) 15 MG tablet Take 1 Tab by mouth 3 times a day for 30 days. 90 Tab 0   • clotrimazole (LOTRIMIN) 1 % Cream      • metFORMIN (GLUCOPHAGE) 850 MG Tab Take 850 mg by mouth 2 Times a Day.     • levothyroxine (SYNTHROID) 88 MCG Tab Take 88 mcg by mouth every morning.     • atorvastatin (LIPITOR) 20 MG Tab Take 20 mg by mouth every day.     • montelukast (SINGULAIR) 10 MG Tab Take 10 mg by mouth every day.       No current facility-administered medications for this visit.       Review Of Systems:    Constitutional - Negative for fatigue  Psychiatric - Negative for depression, poor sleep    Physical Examination:  Vital signs: Ht 1.626 m (5' 4\")   Wt 90.7 kg (200 lb)   BMI 34.33 kg/m²     Musculoskeletal: No abnormal movements.     Mental Status Evaluation:   General: Middle aged white female, dressed in casual attire, good grooming and hygiene, in no apparent distress, calm and cooperative, good eye contact, no psychomotor agitation or retardation  Orientation: Alert and oriented to person, place and time  Recent and remote memory: Grossly intact  Attention span and concentration: Grossly intact  Speech: Spontaneous, normal rate, rhythm and tone  Thought Process: Linear, logical and goal directed  Thought Content: Denies suicidal or homicidal ideations, intent or plan  Perception: Denies auditory or visual hallucinations. No delusions noted  Associations: Intact  Language: Appropriate  Fund of knowledge and vocabulary: Grossly adequate  Mood: \"good\"  Affect: Euthymic, mood congruent  Insight: Good  Judgment: " Good    Depression screening:  Depression Screen (PHQ-2/PHQ-9) 1/2/2020 3/23/2020 2/4/2021   PHQ-2 Total Score - 1 -   PHQ-2 Total Score 0 - 0   PHQ-9 Total Score - 4 -     Interpretation of PHQ-9 Total Score   Score Severity   1-4 No Depression   5-9 Mild Depression   10-14 Moderate Depression   15-19 Moderately Severe Depression   20-27 Severe Depression    Medical Records/Labs/Diagnostic Tests Reviewed:  [place] PDMP records - appropriate refills, no abuse suspected      Impression:  1.  ADHD, inattentive type - stable  2.  Major depressive disorder, recurrent, in full remission - stable    Plan:  1.  Continue Adderall 15 mg 3 times daily for ADHD.  E- prescribed for 3 months  2.  Continue Effexor  mg at bedtime for depression given stable symptoms.  3.  Continue Abilify 10 mg at bedtime for depression augmentation  - AIMS 0 (3/2020)  - Metabolic monitoring (10/2020): Lipid profile with low HDL, A1c elevated at 6.1  - Repeat yearly metabolic monitoring labs due in 10/2021    Return to clinic in 3 months or sooner if symptoms worsen    The proposed treatment plan was discussed with the patient who was provided the opportunity to ask questions and make suggestions regarding alternative treatment. Patient verbalized understanding and expressed agreement with the plan.     Argentina Brunson M.D.  05/06/21    This note was created using voice recognition software (Dragon). The accuracy of the dictation is limited by the abilities of the software. I have reviewed the note prior to signing, however some errors in grammar and context are still possible. If you have any questions related to this note please do not hesitate to contact our office.

## 2021-05-17 DIAGNOSIS — F33.42 MDD (MAJOR DEPRESSIVE DISORDER), RECURRENT, IN FULL REMISSION (HCC): ICD-10-CM

## 2021-05-17 RX ORDER — ARIPIPRAZOLE 10 MG/1
10 TABLET ORAL DAILY
Qty: 90 TABLET | Refills: 0 | Status: SHIPPED | OUTPATIENT
Start: 2021-05-17

## 2021-05-27 ENCOUNTER — APPOINTMENT (RX ONLY)
Dept: URBAN - METROPOLITAN AREA CLINIC 20 | Facility: CLINIC | Age: 66
Setting detail: DERMATOLOGY
End: 2021-05-27

## 2021-05-27 DIAGNOSIS — L30.4 ERYTHEMA INTERTRIGO: ICD-10-CM

## 2021-05-27 PROCEDURE — ? ADDITIONAL NOTES

## 2021-05-27 PROCEDURE — 99203 OFFICE O/P NEW LOW 30 MIN: CPT

## 2021-05-27 PROCEDURE — ? PRESCRIPTION

## 2021-05-27 PROCEDURE — ? COUNSELING

## 2021-05-27 ASSESSMENT — LOCATION ZONE DERM
LOCATION ZONE: LEG
LOCATION ZONE: TRUNK

## 2021-05-27 ASSESSMENT — LOCATION SIMPLE DESCRIPTION DERM
LOCATION SIMPLE: ABDOMEN
LOCATION SIMPLE: LEFT THIGH

## 2021-05-27 ASSESSMENT — LOCATION DETAILED DESCRIPTION DERM
LOCATION DETAILED: LEFT ANTERIOR PROXIMAL THIGH
LOCATION DETAILED: LEFT RIB CAGE

## 2021-05-27 NOTE — PROCEDURE: ADDITIONAL NOTES
Detail Level: Simple
Additional Notes: Minimally active today\\nIntertrigo has been present since 2018\\nRecommended patient to keep areas extremely dry as possible and loose fitting clothing\\nRecommended hydrocortisone 2.5% mixed with Clotrimazole cream twice daily with flares.
Render Risk Assessment In Note?: no

## 2021-05-28 RX ORDER — HYDROCORTISONE 25 MG/G
CREAM TOPICAL BID
Qty: 2 | Refills: 3 | Status: ERX | COMMUNITY
Start: 2021-05-28

## 2021-05-28 RX ADMIN — HYDROCORTISONE: 25 CREAM TOPICAL at 00:00

## 2021-06-28 NOTE — ED PROVIDER NOTE - CONSTITUTIONAL, MLM
· Brother reports worsening over the last 2 weeks as he can tell via their phone conversations  · On admission, the patient was mumbling, babbling, trailing off in her sentences, disoriented  · Alert oriented x3 today, thought content normal on my exam  · Suspect multifactorial in the setting of poor p o  intake, dehydration, LAILA & UTI  · Takes many narcotic meds at home, manages them on her own  Unable to get an accurate idea of how patient actually takes her medications     · Managed by outpatient pain management  · Also medical marijuana-urine positive for opiates and THC  · Morphine initially held in the setting of LAILA, geriatric pain protocol  · Restart morphine as an outpatient  · Geriatrics has been consulted, appreciate recommendations  · H/o vit D deficiency , will supplement daily  · TSH recently was normal  · Ammonia is nl normal... Well appearing, awake, alert, oriented to person, place, time/situation and in no apparent distress, O2 sat 86% on RA

## 2021-10-24 ENCOUNTER — TRANSCRIPTION ENCOUNTER (OUTPATIENT)
Age: 66
End: 2021-10-24

## 2021-11-06 ENCOUNTER — HOSPITAL ENCOUNTER (OUTPATIENT)
Dept: LAB | Facility: MEDICAL CENTER | Age: 66
End: 2021-11-06
Attending: INTERNAL MEDICINE
Payer: MEDICARE

## 2021-11-06 LAB
ALBUMIN SERPL BCP-MCNC: 4.2 G/DL (ref 3.2–4.9)
ALBUMIN/GLOB SERPL: 1.3 G/DL
ALP SERPL-CCNC: 130 U/L (ref 30–99)
ALT SERPL-CCNC: 34 U/L (ref 2–50)
ANION GAP SERPL CALC-SCNC: 15 MMOL/L (ref 7–16)
AST SERPL-CCNC: 23 U/L (ref 12–45)
BILIRUB SERPL-MCNC: 0.2 MG/DL (ref 0.1–1.5)
BUN SERPL-MCNC: 6 MG/DL (ref 8–22)
CALCIUM SERPL-MCNC: 9.5 MG/DL (ref 8.5–10.5)
CHLORIDE SERPL-SCNC: 104 MMOL/L (ref 96–112)
CHOLEST SERPL-MCNC: 136 MG/DL (ref 100–199)
CO2 SERPL-SCNC: 23 MMOL/L (ref 20–33)
CREAT SERPL-MCNC: 0.69 MG/DL (ref 0.5–1.4)
EST. AVERAGE GLUCOSE BLD GHB EST-MCNC: 126 MG/DL
FASTING STATUS PATIENT QL REPORTED: NORMAL
GLOBULIN SER CALC-MCNC: 3.3 G/DL (ref 1.9–3.5)
GLUCOSE SERPL-MCNC: 113 MG/DL (ref 65–99)
HBA1C MFR BLD: 6 % (ref 4–5.6)
HDLC SERPL-MCNC: 34 MG/DL
LDLC SERPL CALC-MCNC: 76 MG/DL
POTASSIUM SERPL-SCNC: 4.3 MMOL/L (ref 3.6–5.5)
PROT SERPL-MCNC: 7.5 G/DL (ref 6–8.2)
SODIUM SERPL-SCNC: 142 MMOL/L (ref 135–145)
TRIGL SERPL-MCNC: 128 MG/DL (ref 0–149)
TSH SERPL DL<=0.005 MIU/L-ACNC: 1.81 UIU/ML (ref 0.38–5.33)

## 2021-11-06 PROCEDURE — 80053 COMPREHEN METABOLIC PANEL: CPT

## 2021-11-06 PROCEDURE — 84443 ASSAY THYROID STIM HORMONE: CPT

## 2021-11-06 PROCEDURE — 36415 COLL VENOUS BLD VENIPUNCTURE: CPT | Mod: GA

## 2021-11-06 PROCEDURE — 80061 LIPID PANEL: CPT

## 2021-11-06 PROCEDURE — 84439 ASSAY OF FREE THYROXINE: CPT

## 2021-11-06 PROCEDURE — 83036 HEMOGLOBIN GLYCOSYLATED A1C: CPT | Mod: GA

## 2021-11-10 LAB — T4 FREE SERPL DIALY-MCNC: 2 NG/DL (ref 1.1–2.4)

## 2022-04-30 ENCOUNTER — HOSPITAL ENCOUNTER (OUTPATIENT)
Dept: LAB | Facility: MEDICAL CENTER | Age: 67
End: 2022-04-30
Attending: INTERNAL MEDICINE
Payer: MEDICARE

## 2022-04-30 LAB
25(OH)D3 SERPL-MCNC: 27 NG/ML (ref 30–100)
ALBUMIN SERPL BCP-MCNC: 4 G/DL (ref 3.2–4.9)
ALBUMIN/GLOB SERPL: 1.3 G/DL
ALP SERPL-CCNC: 104 U/L (ref 30–99)
ALT SERPL-CCNC: 28 U/L (ref 2–50)
ANION GAP SERPL CALC-SCNC: 12 MMOL/L (ref 7–16)
AST SERPL-CCNC: 23 U/L (ref 12–45)
BILIRUB SERPL-MCNC: 0.3 MG/DL (ref 0.1–1.5)
BUN SERPL-MCNC: 11 MG/DL (ref 8–22)
CALCIUM SERPL-MCNC: 9 MG/DL (ref 8.5–10.5)
CHLORIDE SERPL-SCNC: 102 MMOL/L (ref 96–112)
CHOLEST SERPL-MCNC: 161 MG/DL (ref 100–199)
CO2 SERPL-SCNC: 23 MMOL/L (ref 20–33)
CREAT SERPL-MCNC: 0.67 MG/DL (ref 0.5–1.4)
EST. AVERAGE GLUCOSE BLD GHB EST-MCNC: 126 MG/DL
FASTING STATUS PATIENT QL REPORTED: NORMAL
GFR SERPLBLD CREATININE-BSD FMLA CKD-EPI: 97 ML/MIN/1.73 M 2
GLOBULIN SER CALC-MCNC: 3 G/DL (ref 1.9–3.5)
GLUCOSE SERPL-MCNC: 121 MG/DL (ref 65–99)
HBA1C MFR BLD: 6 % (ref 4–5.6)
HDLC SERPL-MCNC: 40 MG/DL
LDLC SERPL CALC-MCNC: 100 MG/DL
POTASSIUM SERPL-SCNC: 4.1 MMOL/L (ref 3.6–5.5)
PROT SERPL-MCNC: 7 G/DL (ref 6–8.2)
SODIUM SERPL-SCNC: 137 MMOL/L (ref 135–145)
T4 FREE SERPL-MCNC: 1.48 NG/DL (ref 0.93–1.7)
TRIGL SERPL-MCNC: 105 MG/DL (ref 0–149)
TSH SERPL DL<=0.005 MIU/L-ACNC: 2.1 UIU/ML (ref 0.38–5.33)

## 2022-04-30 PROCEDURE — 84439 ASSAY OF FREE THYROXINE: CPT

## 2022-04-30 PROCEDURE — 82306 VITAMIN D 25 HYDROXY: CPT

## 2022-04-30 PROCEDURE — 84443 ASSAY THYROID STIM HORMONE: CPT

## 2022-04-30 PROCEDURE — 80053 COMPREHEN METABOLIC PANEL: CPT

## 2022-04-30 PROCEDURE — 80061 LIPID PANEL: CPT

## 2022-04-30 PROCEDURE — 36415 COLL VENOUS BLD VENIPUNCTURE: CPT

## 2022-04-30 PROCEDURE — 83036 HEMOGLOBIN GLYCOSYLATED A1C: CPT | Mod: GA

## 2022-10-29 ENCOUNTER — HOSPITAL ENCOUNTER (OUTPATIENT)
Dept: LAB | Facility: MEDICAL CENTER | Age: 67
End: 2022-10-29
Attending: INTERNAL MEDICINE
Payer: MEDICARE

## 2022-10-29 LAB
25(OH)D3 SERPL-MCNC: 34 NG/ML (ref 30–100)
ALBUMIN SERPL BCP-MCNC: 4.3 G/DL (ref 3.2–4.9)
ALBUMIN/GLOB SERPL: 1.4 G/DL
ALP SERPL-CCNC: 121 U/L (ref 30–99)
ALT SERPL-CCNC: 32 U/L (ref 2–50)
ANION GAP SERPL CALC-SCNC: 12 MMOL/L (ref 7–16)
AST SERPL-CCNC: 30 U/L (ref 12–45)
BILIRUB SERPL-MCNC: 0.3 MG/DL (ref 0.1–1.5)
BUN SERPL-MCNC: 6 MG/DL (ref 8–22)
CALCIUM SERPL-MCNC: 9.3 MG/DL (ref 8.5–10.5)
CHLORIDE SERPL-SCNC: 104 MMOL/L (ref 96–112)
CHOLEST SERPL-MCNC: 145 MG/DL (ref 100–199)
CO2 SERPL-SCNC: 24 MMOL/L (ref 20–33)
CREAT SERPL-MCNC: 0.78 MG/DL (ref 0.5–1.4)
CREAT UR-MCNC: 80.96 MG/DL
EST. AVERAGE GLUCOSE BLD GHB EST-MCNC: 134 MG/DL
FASTING STATUS PATIENT QL REPORTED: NORMAL
GFR SERPLBLD CREATININE-BSD FMLA CKD-EPI: 84 ML/MIN/1.73 M 2
GLOBULIN SER CALC-MCNC: 3.1 G/DL (ref 1.9–3.5)
GLUCOSE SERPL-MCNC: 95 MG/DL (ref 65–99)
HBA1C MFR BLD: 6.3 % (ref 4–5.6)
HDLC SERPL-MCNC: 34 MG/DL
LDLC SERPL CALC-MCNC: 78 MG/DL
MICROALBUMIN UR-MCNC: <1.2 MG/DL
MICROALBUMIN/CREAT UR: NORMAL MG/G (ref 0–30)
POTASSIUM SERPL-SCNC: 4.4 MMOL/L (ref 3.6–5.5)
PROT SERPL-MCNC: 7.4 G/DL (ref 6–8.2)
SODIUM SERPL-SCNC: 140 MMOL/L (ref 135–145)
T4 FREE SERPL-MCNC: 1.45 NG/DL (ref 0.93–1.7)
TRIGL SERPL-MCNC: 165 MG/DL (ref 0–149)
TSH SERPL DL<=0.005 MIU/L-ACNC: 1.26 UIU/ML (ref 0.38–5.33)

## 2022-10-29 PROCEDURE — 36415 COLL VENOUS BLD VENIPUNCTURE: CPT

## 2022-10-29 PROCEDURE — 84443 ASSAY THYROID STIM HORMONE: CPT

## 2022-10-29 PROCEDURE — 84439 ASSAY OF FREE THYROXINE: CPT

## 2022-10-29 PROCEDURE — 83036 HEMOGLOBIN GLYCOSYLATED A1C: CPT | Mod: GA

## 2022-10-29 PROCEDURE — 82306 VITAMIN D 25 HYDROXY: CPT

## 2022-10-29 PROCEDURE — 80061 LIPID PANEL: CPT

## 2022-10-29 PROCEDURE — 82570 ASSAY OF URINE CREATININE: CPT

## 2022-10-29 PROCEDURE — 80053 COMPREHEN METABOLIC PANEL: CPT

## 2022-10-29 PROCEDURE — 82043 UR ALBUMIN QUANTITATIVE: CPT

## 2022-11-09 ENCOUNTER — PATIENT MESSAGE (OUTPATIENT)
Dept: HEALTH INFORMATION MANAGEMENT | Facility: OTHER | Age: 67
End: 2022-11-09

## 2022-11-28 ENCOUNTER — NON-APPOINTMENT (OUTPATIENT)
Age: 67
End: 2022-11-28

## 2022-12-04 ENCOUNTER — NON-APPOINTMENT (OUTPATIENT)
Age: 67
End: 2022-12-04

## 2023-01-01 ENCOUNTER — NON-APPOINTMENT (OUTPATIENT)
Age: 68
End: 2023-01-01

## 2023-05-09 ENCOUNTER — APPOINTMENT (OUTPATIENT)
Dept: LAB | Facility: MEDICAL CENTER | Age: 68
End: 2023-05-09
Attending: STUDENT IN AN ORGANIZED HEALTH CARE EDUCATION/TRAINING PROGRAM
Payer: MEDICARE

## 2023-06-23 ENCOUNTER — EMERGENCY (EMERGENCY)
Facility: HOSPITAL | Age: 68
LOS: 0 days | Discharge: HOME | End: 2023-06-23
Attending: EMERGENCY MEDICINE
Payer: MEDICARE

## 2023-06-23 VITALS
DIASTOLIC BLOOD PRESSURE: 91 MMHG | RESPIRATION RATE: 18 BRPM | OXYGEN SATURATION: 100 % | HEART RATE: 66 BPM | SYSTOLIC BLOOD PRESSURE: 130 MMHG | TEMPERATURE: 98 F

## 2023-06-23 VITALS — HEIGHT: 63 IN | WEIGHT: 151.9 LBS

## 2023-06-23 DIAGNOSIS — R42 DIZZINESS AND GIDDINESS: ICD-10-CM

## 2023-06-23 DIAGNOSIS — R07.89 OTHER CHEST PAIN: ICD-10-CM

## 2023-06-23 DIAGNOSIS — Z87.891 PERSONAL HISTORY OF NICOTINE DEPENDENCE: ICD-10-CM

## 2023-06-23 DIAGNOSIS — I10 ESSENTIAL (PRIMARY) HYPERTENSION: ICD-10-CM

## 2023-06-23 DIAGNOSIS — Z92.21 PERSONAL HISTORY OF ANTINEOPLASTIC CHEMOTHERAPY: ICD-10-CM

## 2023-06-23 DIAGNOSIS — Z79.82 LONG TERM (CURRENT) USE OF ASPIRIN: ICD-10-CM

## 2023-06-23 DIAGNOSIS — E78.5 HYPERLIPIDEMIA, UNSPECIFIED: ICD-10-CM

## 2023-06-23 DIAGNOSIS — R06.02 SHORTNESS OF BREATH: ICD-10-CM

## 2023-06-23 DIAGNOSIS — Z85.3 PERSONAL HISTORY OF MALIGNANT NEOPLASM OF BREAST: ICD-10-CM

## 2023-06-23 LAB
ALBUMIN SERPL ELPH-MCNC: 4.1 G/DL — SIGNIFICANT CHANGE UP (ref 3.3–5)
ALP SERPL-CCNC: 79 U/L — SIGNIFICANT CHANGE UP (ref 40–120)
ALT FLD-CCNC: 26 U/L — SIGNIFICANT CHANGE UP (ref 12–78)
ANION GAP SERPL CALC-SCNC: 1 MMOL/L — LOW (ref 5–17)
APTT BLD: 32.9 SEC — SIGNIFICANT CHANGE UP (ref 27.5–35.5)
AST SERPL-CCNC: 29 U/L — SIGNIFICANT CHANGE UP (ref 15–37)
BASOPHILS # BLD AUTO: 0.03 K/UL — SIGNIFICANT CHANGE UP (ref 0–0.2)
BASOPHILS NFR BLD AUTO: 0.4 % — SIGNIFICANT CHANGE UP (ref 0–2)
BILIRUB SERPL-MCNC: 0.6 MG/DL — SIGNIFICANT CHANGE UP (ref 0.2–1.2)
BUN SERPL-MCNC: 18 MG/DL — SIGNIFICANT CHANGE UP (ref 7–23)
CALCIUM SERPL-MCNC: 9.4 MG/DL — SIGNIFICANT CHANGE UP (ref 8.5–10.1)
CHLORIDE SERPL-SCNC: 111 MMOL/L — HIGH (ref 96–108)
CO2 SERPL-SCNC: 27 MMOL/L — SIGNIFICANT CHANGE UP (ref 22–31)
CREAT SERPL-MCNC: 0.86 MG/DL — SIGNIFICANT CHANGE UP (ref 0.5–1.3)
EGFR: 74 ML/MIN/1.73M2 — SIGNIFICANT CHANGE UP
EOSINOPHIL # BLD AUTO: 0.24 K/UL — SIGNIFICANT CHANGE UP (ref 0–0.5)
EOSINOPHIL NFR BLD AUTO: 3.6 % — SIGNIFICANT CHANGE UP (ref 0–6)
GLUCOSE SERPL-MCNC: 85 MG/DL — SIGNIFICANT CHANGE UP (ref 70–99)
HCT VFR BLD CALC: 39.5 % — SIGNIFICANT CHANGE UP (ref 34.5–45)
HGB BLD-MCNC: 13.1 G/DL — SIGNIFICANT CHANGE UP (ref 11.5–15.5)
IMM GRANULOCYTES NFR BLD AUTO: 0.1 % — SIGNIFICANT CHANGE UP (ref 0–0.9)
INR BLD: 1.06 RATIO — SIGNIFICANT CHANGE UP (ref 0.88–1.16)
LYMPHOCYTES # BLD AUTO: 1.91 K/UL — SIGNIFICANT CHANGE UP (ref 1–3.3)
LYMPHOCYTES # BLD AUTO: 28.6 % — SIGNIFICANT CHANGE UP (ref 13–44)
MCHC RBC-ENTMCNC: 28.4 PG — SIGNIFICANT CHANGE UP (ref 27–34)
MCHC RBC-ENTMCNC: 33.2 GM/DL — SIGNIFICANT CHANGE UP (ref 32–36)
MCV RBC AUTO: 85.7 FL — SIGNIFICANT CHANGE UP (ref 80–100)
MONOCYTES # BLD AUTO: 0.59 K/UL — SIGNIFICANT CHANGE UP (ref 0–0.9)
MONOCYTES NFR BLD AUTO: 8.8 % — SIGNIFICANT CHANGE UP (ref 2–14)
NEUTROPHILS # BLD AUTO: 3.9 K/UL — SIGNIFICANT CHANGE UP (ref 1.8–7.4)
NEUTROPHILS NFR BLD AUTO: 58.5 % — SIGNIFICANT CHANGE UP (ref 43–77)
PLATELET # BLD AUTO: 190 K/UL — SIGNIFICANT CHANGE UP (ref 150–400)
POTASSIUM SERPL-MCNC: 5.3 MMOL/L — SIGNIFICANT CHANGE UP (ref 3.5–5.3)
POTASSIUM SERPL-SCNC: 5.3 MMOL/L — SIGNIFICANT CHANGE UP (ref 3.5–5.3)
PROT SERPL-MCNC: 7.2 GM/DL — SIGNIFICANT CHANGE UP (ref 6–8.3)
PROTHROM AB SERPL-ACNC: 12.3 SEC — SIGNIFICANT CHANGE UP (ref 10.5–13.4)
RBC # BLD: 4.61 M/UL — SIGNIFICANT CHANGE UP (ref 3.8–5.2)
RBC # FLD: 13.8 % — SIGNIFICANT CHANGE UP (ref 10.3–14.5)
SODIUM SERPL-SCNC: 139 MMOL/L — SIGNIFICANT CHANGE UP (ref 135–145)
TROPONIN I, HIGH SENSITIVITY RESULT: 3.52 NG/L — SIGNIFICANT CHANGE UP
TSH SERPL-MCNC: 2.35 UU/ML — SIGNIFICANT CHANGE UP (ref 0.34–4.82)
WBC # BLD: 6.68 K/UL — SIGNIFICANT CHANGE UP (ref 3.8–10.5)
WBC # FLD AUTO: 6.68 K/UL — SIGNIFICANT CHANGE UP (ref 3.8–10.5)

## 2023-06-23 PROCEDURE — 85025 COMPLETE CBC W/AUTO DIFF WBC: CPT

## 2023-06-23 PROCEDURE — 36415 COLL VENOUS BLD VENIPUNCTURE: CPT

## 2023-06-23 PROCEDURE — 84484 ASSAY OF TROPONIN QUANT: CPT

## 2023-06-23 PROCEDURE — 71046 X-RAY EXAM CHEST 2 VIEWS: CPT

## 2023-06-23 PROCEDURE — 80053 COMPREHEN METABOLIC PANEL: CPT

## 2023-06-23 PROCEDURE — 71046 X-RAY EXAM CHEST 2 VIEWS: CPT | Mod: 26

## 2023-06-23 PROCEDURE — 99285 EMERGENCY DEPT VISIT HI MDM: CPT

## 2023-06-23 PROCEDURE — 85610 PROTHROMBIN TIME: CPT

## 2023-06-23 PROCEDURE — 93010 ELECTROCARDIOGRAM REPORT: CPT

## 2023-06-23 PROCEDURE — 99285 EMERGENCY DEPT VISIT HI MDM: CPT | Mod: 25

## 2023-06-23 PROCEDURE — 84443 ASSAY THYROID STIM HORMONE: CPT

## 2023-06-23 PROCEDURE — 85730 THROMBOPLASTIN TIME PARTIAL: CPT

## 2023-06-23 PROCEDURE — 93005 ELECTROCARDIOGRAM TRACING: CPT

## 2023-06-23 NOTE — ED STATDOCS - NS ED ATTENDING STATEMENT MOD
This was a shared visit with the JAMA. I reviewed and verified the documentation and independently performed the documented:

## 2023-06-23 NOTE — ED ADULT NURSE NOTE - EXTENSIONS OF SELF_ADULT
[facility] visit for home hospice patient sent to the ER. Patient from [facility] in [place]. Had 2 falls today, 2nd one with bleeding to back of head. Seen by emergency room physician. 2 staples placed.  Patient to [place] and return to [facility]
None

## 2023-06-23 NOTE — ED STATDOCS - CARE PROVIDER_API CALL
Roderick Howe  Internal Medicine  [address] [place] [address]  Phone: [phone]  Fax: [phone]  Follow Up Time:     Junie Lopes  Interventional Cardiology  [address] [place] [address]  Phone: [phone]  Fax: [phone]  Follow Up Time:

## 2023-06-23 NOTE — ED STATDOCS - PATIENT PORTAL LINK FT
You can access the FollowMyHealth Patient Portal offered by [facility] by registering at the following website: [email or web address]. By joining [facility]’s FollowMyHealth portal, you will also be able to view your health information using other applications (apps) compatible with our system.

## 2023-06-23 NOTE — ED ADULT NURSE NOTE - NSFALLRISKINTERV_ED_ALL_ED

## 2023-06-23 NOTE — ED ADULT NURSE NOTE - OBJECTIVE STATEMENT
Patient sent by cardiology for EKG changes and fluttery feeling in her chest sometimes.  Patient very anxious.

## 2023-06-23 NOTE — ED STATDOCS - NSFOLLOWUPINSTRUCTIONS_ED_ALL_ED_FT
FOLLOW UP WITH YOUR CARDIOLOGIST THIS WEEK. CALL THE OFFICE TO MAKE AN APPOINTMENT. RETURN TO ER FOR ANY WORSENING SYMPTOMS OR NEW CONCERNS.     Chest Pain    WHAT YOU NEED TO KNOW:    Chest pain can be caused by a range of conditions, from not serious to life-threatening. Chest pain can be a symptom of a digestive problem, such as acid reflux or a stomach ulcer. An anxiety attack or a strong emotion, such as anger, can also cause chest pain. Infection, inflammation, or a fracture in the bones or cartilage in your chest can cause pain or discomfort. Sometimes chest pain or pressure is caused by poor blood flow to your heart (angina). Chest pain may also be caused by life-threatening conditions such as a heart attack or blood clot in your lungs.     DISCHARGE INSTRUCTIONS:    Call 911 if:     You have any of the following signs of a heart attack:   Squeezing, pressure, or pain in your chest       and any of the following:   Discomfort or pain in your back, neck, jaw, stomach, or arm       Shortness of breath      Nausea or vomiting      Lightheadedness or a sudden cold sweat        Return to the emergency department if:     You have chest discomfort that gets worse, even with medicine.      You cough or vomit blood.       Your bowel movements are black or bloody.       You cannot stop vomiting, or it hurts to swallow.     Contact your healthcare provider if:     You have questions or concerns about your condition or care.        Medicines:     Medicines may be given to treat the cause of your chest pain. Examples include pain medicine, anxiety medicine, or medicines to increase blood flow to your heart.       Do not take certain medicines without asking your healthcare provider first. These include NSAIDs, herbal or vitamin supplements, or hormones (estrogen or progestin).       Take your medicine as directed. Contact your healthcare provider if you think your medicine is not helping or if you have side effects. Tell him or her if you are allergic to any medicine. Keep a list of the medicines, vitamins, and herbs you take. Include the amounts, and when and why you take them. Bring the list or the pill bottles to follow-up visits. Carry your medicine list with you in case of an emergency.    Follow up with your healthcare provider within 72 hours, or as directed: You may need to return for more tests to find the cause of your chest pain. You may be referred to a specialist, such as a cardiologist or gastroenterologist. Write down your questions so you remember to ask them during your visits.     Healthy living tips: The following are general healthy guidelines. If your chest pain is caused by a heart problem, your healthcare provider will give you specific guidelines to follow.    Do not smoke. Nicotine and other chemicals in cigarettes and cigars can cause lung and heart damage. Ask your healthcare provider for information if you currently smoke and need help to quit. E-cigarettes or smokeless tobacco still contain nicotine. Talk to your healthcare provider before you use these products.       Eat a variety of healthy, low-fat, low-salt foods. Healthy foods include fruits, vegetables, whole-grain breads, low-fat dairy products, beans, lean meats, and fish. Ask for more information about a heart healthy diet.      Drink plenty of water every day. Your body is made of mostly water. Water helps your body to control your temperature and blood pressure. Ask your healthcare provider how much water you should drink every day.      Ask about activity. Your healthcare provider will tell you which activities to limit or avoid. Ask when you can drive, return to work, and have sex. Ask about the best exercise plan for you.      Maintain a healthy weight. Ask your healthcare provider how much you should weigh. Ask him or her to help you create a weight loss plan if you are overweight.       Get the flu and pneumonia vaccines. All adults should get the influenza (flu) vaccine. Get it every year as soon as it becomes available. The pneumococcal vaccine is given to adults aged 65 years or older. The vaccine is given every 5 years to prevent pneumococcal disease, such as pneumonia.    If you have a stent:     Carry your stent card with you at all times.       Let all healthcare providers know that you have a stent.

## 2023-06-23 NOTE — ED ADULT NURSE NOTE - NS_HUMANTRAFFICKQ4_ED_ALL_ED
----- Message from Stan Olivo sent at 3/29/2018  2:08 PM CDT -----  Contact: [facility]/Silvia mccarty [phone] regarding lab results that were faxed over   No

## 2023-06-23 NOTE — ED STATDOCS - ATTENDING APP SHARED VISIT CONTRIBUTION OF CARE
sent by pmd for evaluation  had episode of dizziness, left chest tingling  in ED NIH 0, ambulates with steady gait    will check labs, ct head  follow up with pmd

## 2023-06-23 NOTE — ED STATDOCS - OBJECTIVE STATEMENT
68F sent for eval by KRISTY Howe. Pt seen in his office today, had appt due brief (1 minute) episode of lightheadedness and SOB on 6/18 which occurred as she walked into a house she was about to clean. No CP/N/V/back/neck/shoulder pain. Pt had a similar episode the week prior. SHe has also noticed tingling "every few seconds" on the left sided of her chest for several days. Pt had negative stress in October and is on Ozempic for wt loss under the care of an endocrine doctor. Also hx of left breast CA s/p radiation 2012, HTN, HLD. As per call in note pt had had septal changes of EKG in office.

## 2023-06-23 NOTE — ED ADULT TRIAGE NOTE - NS ED NURSE DIRECT TO ROOM YN
Neurosurgery Progress Note    Subjective:  POD#1 T4 - T6 laminectomy with resection of tumor, T3 - T7 stealth guided fusion.  Doing well.  Pain to op site.  No upper or lower extremity radicular symptoms.    Exam:  Dressing dry  Motor 5/5 to bilateral upper and lower ext.  Sensory intact.    BP  Min: 110/59  Max: 110/59  Pulse  Av.8  Min: 71  Max: 85  Resp  Av.5  Min: 7  Max: 20  Temp  Av.7 °C (98.1 °F)  Min: 36.2 °C (97.1 °F)  Max: 37 °C (98.6 °F)  SpO2  Av.8 %  Min: 85 %  Max: 100 %    No Data Recorded    Recent Labs      19   0259   WBC  15.1*   RBC  2.50*   HEMOGLOBIN  8.5*   HEMATOCRIT  25.1*   MCV  98.8*   MCH  33.2*   MCHC  33.6*   RDW  45.1   PLATELETCT  161*   MPV  10.0     Recent Labs      19   0351  19   0259   SODIUM  132*  128*   POTASSIUM  4.4  4.6   CHLORIDE  99  95*   CO2  21  23   GLUCOSE  225*  244*   BUN  27*  29*   CREATININE  1.06  1.05   CALCIUM  10.1  8.7     Recent Labs      19   0351   APTT  23.6*           Intake/Output       19 0700 - 19 0659 19 07 - 19 0659      1317-2680 4473-1861 Total 5260-5139 1212-2432 Total       Intake    P.O.  --  480 480  --  -- --    P.O. -- 480 480 -- -- --    I.V.  3000  1470 4470  --  -- --    Volume (mL) (lactated ringers infusion) 3000 -- 3000 -- -- --    Volume (mL) (NS infusion) -- 1470 1470 -- -- --    Total Intake 3000 1950 4950 -- -- --       Output    Urine  1300  2600 3900  --  -- --    Urine 1200 -- 1200 -- -- --    Output (mL) (Urethral Catheter Latex 16 Fr.) 100 2600 2700 -- -- --    Drains  200  170 370  --  -- --    Output (mL) (Closed/Suction Drain 1 Left Back Juan Rea) 200 170 370 -- -- --    Blood  500  -- 500  --  -- --    Est. Blood Loss 500 -- 500 -- -- --    Total Output  6364 3419 -- -- --       Net I/O     1000 -820 180 -- -- --            Intake/Output Summary (Last 24 hours) at 19 0917  Last data filed at 19 0600   Gross per 24 hour   Intake              3950 ml   Output             4770 ml   Net             -820 ml            • Pharmacy Consult Request  1 Each PHARMACY TO DOSE   • MD SYED...DO NOT ADMINISTER NSAIDS or ASPIRIN unless ORDERED By Neurosurgery  1 Each PRN   • docusate sodium  100 mg BID   • senna-docusate  1 Tab Nightly   • senna-docusate  1 Tab Q24HRS PRN   • polyethylene glycol/lytes  1 Packet BID PRN   • magnesium hydroxide  30 mL QDAY PRN   • bisacodyl  10 mg Q24HRS PRN   • fleet  1 Each Once PRN   • NS   Continuous   • enoxaparin  40 mg DAILY   • HYDROmorphone  0.5 mg Q3HRS PRN   • oxyCODONE immediate release  10 mg Q3HRS PRN   • oxyCODONE immediate-release  5 mg Q3HRS PRN   • diphenhydrAMINE  25 mg Q6HRS PRN    Or   • diphenhydrAMINE  25 mg Q6HRS PRN   • methocarbamol  750 mg 4X/DAY   • ondansetron  4 mg Q4HRS PRN   • ondansetron  4 mg Q4HRS PRN   • ALPRAZolam  0.5 mg TID PRN   • cloNIDine  0.1 mg Q4HRS PRN   • benzocaine-menthol  1 Lozenge Q2HRS PRN   • calcium carbonate  500 mg BID   • vitamin D  5,000 Units DAILY   • metFORMIN  1,000 mg QDAY with Breakfast    And   • metFORMIN  500 mg AC PM MEAL   • insulin glargine  20 Units QAM INSULIN   • hydrALAZINE  10 mg Q6HRS PRN   • insulin regular  2-10 Units 4X/DAY ACHS    And   • glucose  16 g Q15 MIN PRN    And   • dextrose 10% bolus  250 mL Q15 MIN PRN   • Respiratory Care per Protocol   Continuous RT   • acetaminophen  650 mg Q6HRS PRN   • amLODIPine  10 mg DAILY   • atorvastatin  20 mg Nightly   • lisinopril  40 mg DAILY   • loratadine  10 mg DAILY   • omeprazole  20 mg DAILY   • spironolactone  25 mg DAILY   • terazosin  5 mg BID   • verapamil ER  180 mg BID       Assessment and Plan:  Hospital day #7  POD #1 T4 - T6 laminectomy with resection of tumor, T3 - T7 stealth guided fusion.  PT/OT        Yes

## 2023-06-23 NOTE — ED ADULT TRIAGE NOTE - CHIEF COMPLAINT QUOTE
pt presents to ED from MD Howe's office for palpitations, dizziness, chest discomfort. had ekg at office with septal changes. pt reporting irregular HR. ekg in progress.

## 2023-06-24 PROBLEM — E78.5 HYPERLIPIDEMIA, UNSPECIFIED: Chronic | Status: ACTIVE | Noted: 2021-03-04

## 2023-06-24 PROBLEM — I10 ESSENTIAL (PRIMARY) HYPERTENSION: Chronic | Status: ACTIVE | Noted: 2021-03-04

## 2023-07-21 ENCOUNTER — NON-APPOINTMENT (OUTPATIENT)
Age: 68
End: 2023-07-21

## 2023-08-16 ENCOUNTER — APPOINTMENT (OUTPATIENT)
Dept: OTOLARYNGOLOGY | Facility: CLINIC | Age: 68
End: 2023-08-16
Payer: MEDICARE

## 2023-08-16 VITALS — WEIGHT: 144 LBS | HEIGHT: 63 IN | BODY MASS INDEX: 25.52 KG/M2

## 2023-08-16 DIAGNOSIS — H93.11 TINNITUS, RIGHT EAR: ICD-10-CM

## 2023-08-16 DIAGNOSIS — Z91.09 OTHER ALLERGY STATUS, OTHER THAN TO DRUGS AND BIOLOGICAL SUBSTANCES: ICD-10-CM

## 2023-08-16 DIAGNOSIS — H90.3 SENSORINEURAL HEARING LOSS, BILATERAL: ICD-10-CM

## 2023-08-16 DIAGNOSIS — H60.543 ACUTE ECZEMATOID OTITIS EXTERNA, BILATERAL: ICD-10-CM

## 2023-08-16 DIAGNOSIS — H93.291 OTHER ABNORMAL AUDITORY PERCEPTIONS, RIGHT EAR: ICD-10-CM

## 2023-08-16 DIAGNOSIS — J31.0 CHRONIC RHINITIS: ICD-10-CM

## 2023-08-16 DIAGNOSIS — H68.022 CHRONIC EUSTACHIAN SALPINGITIS, LEFT EAR: ICD-10-CM

## 2023-08-16 PROCEDURE — 31231 NASAL ENDOSCOPY DX: CPT

## 2023-08-16 PROCEDURE — 99204 OFFICE O/P NEW MOD 45 MIN: CPT | Mod: 25

## 2023-08-16 RX ORDER — FLUOCINOLONE ACETONIDE 0.25 MG/G
0.03 OINTMENT TOPICAL
Qty: 1 | Refills: 1 | Status: ACTIVE | COMMUNITY
Start: 2023-08-16 | End: 1900-01-01

## 2023-08-16 NOTE — REVIEW OF SYSTEMS
[Ear Pain] : ear pain [Negative] : Heme/Lymph [Patient Intake Form Reviewed] : Patient intake form was reviewed [de-identified] : clogged

## 2023-08-16 NOTE — PROCEDURE
[FreeTextEntry6] : Indication:  Unable to adequately examine nasal passages and sinus drainage with anterior rhinoscopy. The patient has sinus pressure and pnd  Scope # Mild septal deviation is present on direct visualization on either side. Both inferior nasal turbinates are moderate in size with normal  appearing mucosa.  The sinus endoscope was introduced into the right nares exam right middle meatus reveals no mucopus, polyps or inflammation.  The middle turbinate is unremarkable. The scope was advanced and the sphenoethmoid region was inspected. The superior meatus and nasal vault are unremarkable.  The nasopharynx is unremarkable without inflammation or mass The sinus endoscope was introduced into the left nares exam of the left middle meatus reveals no mucopus, polyps or inflammation and the left middle turbinate is unremarkable. The scope was advanced and the sphenoethmoid region was inspected. The left superior meatus and nasal vault are unremarkable.

## 2023-08-16 NOTE — PHYSICAL EXAM
[Midline] : trachea located in midline position [Normal] : no rashes [de-identified] : mild dry skin

## 2023-08-16 NOTE — HISTORY OF PRESENT ILLNESS
[de-identified] : co rt ear vibration w talking ad soreness and fluid in ear canal x years co ring ad, echo and head vibration w speech worse past mo co nasal drip sinus surgery x 2 in past using flonase but not that helpful cannot tolerate azelastine

## 2023-08-16 NOTE — DATA REVIEWED
[de-identified] : Moderate SNHL 5798-8304 Hz bilaterally rising to borderline WNL to mild loss 6-8000 Hz Type A  tymps

## 2023-08-16 NOTE — ASSESSMENT
[FreeTextEntry1] : ct sinuses 3/22 af left sphenoid otherwise neg sinus pressure pnd, eust tube dys using sudafed and fluticasone mild ear eczema audio au loss 5825-1528 hz tinnitus vibration rt ear ct  t bones ro fistula
Klerman, Elyse(Attending)

## 2023-08-23 ENCOUNTER — NON-APPOINTMENT (OUTPATIENT)
Age: 68
End: 2023-08-23

## 2023-11-28 ENCOUNTER — OUTPATIENT (OUTPATIENT)
Dept: OUTPATIENT SERVICES | Facility: HOSPITAL | Age: 68
LOS: 1 days | End: 2023-11-28
Payer: MEDICARE

## 2023-11-28 VITALS
WEIGHT: 141.98 LBS | DIASTOLIC BLOOD PRESSURE: 85 MMHG | HEART RATE: 68 BPM | SYSTOLIC BLOOD PRESSURE: 132 MMHG | TEMPERATURE: 98 F | HEIGHT: 62 IN | OXYGEN SATURATION: 100 % | RESPIRATION RATE: 16 BRPM

## 2023-11-28 DIAGNOSIS — Z98.890 OTHER SPECIFIED POSTPROCEDURAL STATES: Chronic | ICD-10-CM

## 2023-11-28 DIAGNOSIS — J38.1 POLYP OF VOCAL CORD AND LARYNX: Chronic | ICD-10-CM

## 2023-11-28 DIAGNOSIS — Z92.241 PERSONAL HISTORY OF SYSTEMIC STEROID THERAPY: Chronic | ICD-10-CM

## 2023-11-28 DIAGNOSIS — Z90.49 ACQUIRED ABSENCE OF OTHER SPECIFIED PARTS OF DIGESTIVE TRACT: Chronic | ICD-10-CM

## 2023-11-28 DIAGNOSIS — R93.3 ABNORMAL FINDINGS ON DIAGNOSTIC IMAGING OF OTHER PARTS OF DIGESTIVE TRACT: ICD-10-CM

## 2023-11-28 LAB
A1C WITH ESTIMATED AVERAGE GLUCOSE RESULT: 5.2 % — SIGNIFICANT CHANGE UP (ref 4–5.6)
A1C WITH ESTIMATED AVERAGE GLUCOSE RESULT: 5.2 % — SIGNIFICANT CHANGE UP (ref 4–5.6)
ANION GAP SERPL CALC-SCNC: 4 MMOL/L — LOW (ref 5–17)
ANION GAP SERPL CALC-SCNC: 4 MMOL/L — LOW (ref 5–17)
APTT BLD: 34.1 SEC — SIGNIFICANT CHANGE UP (ref 24.5–35.6)
APTT BLD: 34.1 SEC — SIGNIFICANT CHANGE UP (ref 24.5–35.6)
BASOPHILS # BLD AUTO: 0.03 K/UL — SIGNIFICANT CHANGE UP (ref 0–0.2)
BASOPHILS # BLD AUTO: 0.03 K/UL — SIGNIFICANT CHANGE UP (ref 0–0.2)
BASOPHILS NFR BLD AUTO: 0.6 % — SIGNIFICANT CHANGE UP (ref 0–2)
BASOPHILS NFR BLD AUTO: 0.6 % — SIGNIFICANT CHANGE UP (ref 0–2)
BUN SERPL-MCNC: 15 MG/DL — SIGNIFICANT CHANGE UP (ref 7–23)
BUN SERPL-MCNC: 15 MG/DL — SIGNIFICANT CHANGE UP (ref 7–23)
CALCIUM SERPL-MCNC: 9.4 MG/DL — SIGNIFICANT CHANGE UP (ref 8.5–10.1)
CALCIUM SERPL-MCNC: 9.4 MG/DL — SIGNIFICANT CHANGE UP (ref 8.5–10.1)
CHLORIDE SERPL-SCNC: 112 MMOL/L — HIGH (ref 96–108)
CHLORIDE SERPL-SCNC: 112 MMOL/L — HIGH (ref 96–108)
CO2 SERPL-SCNC: 27 MMOL/L — SIGNIFICANT CHANGE UP (ref 22–31)
CO2 SERPL-SCNC: 27 MMOL/L — SIGNIFICANT CHANGE UP (ref 22–31)
CREAT SERPL-MCNC: 0.72 MG/DL — SIGNIFICANT CHANGE UP (ref 0.5–1.3)
CREAT SERPL-MCNC: 0.72 MG/DL — SIGNIFICANT CHANGE UP (ref 0.5–1.3)
EGFR: 92 ML/MIN/1.73M2 — SIGNIFICANT CHANGE UP
EGFR: 92 ML/MIN/1.73M2 — SIGNIFICANT CHANGE UP
EOSINOPHIL # BLD AUTO: 0.14 K/UL — SIGNIFICANT CHANGE UP (ref 0–0.5)
EOSINOPHIL # BLD AUTO: 0.14 K/UL — SIGNIFICANT CHANGE UP (ref 0–0.5)
EOSINOPHIL NFR BLD AUTO: 2.7 % — SIGNIFICANT CHANGE UP (ref 0–6)
EOSINOPHIL NFR BLD AUTO: 2.7 % — SIGNIFICANT CHANGE UP (ref 0–6)
ESTIMATED AVERAGE GLUCOSE: 103 MG/DL — SIGNIFICANT CHANGE UP (ref 68–114)
ESTIMATED AVERAGE GLUCOSE: 103 MG/DL — SIGNIFICANT CHANGE UP (ref 68–114)
GLUCOSE SERPL-MCNC: 93 MG/DL — SIGNIFICANT CHANGE UP (ref 70–99)
GLUCOSE SERPL-MCNC: 93 MG/DL — SIGNIFICANT CHANGE UP (ref 70–99)
HCT VFR BLD CALC: 38.7 % — SIGNIFICANT CHANGE UP (ref 34.5–45)
HCT VFR BLD CALC: 38.7 % — SIGNIFICANT CHANGE UP (ref 34.5–45)
HGB BLD-MCNC: 12.7 G/DL — SIGNIFICANT CHANGE UP (ref 11.5–15.5)
HGB BLD-MCNC: 12.7 G/DL — SIGNIFICANT CHANGE UP (ref 11.5–15.5)
IMM GRANULOCYTES NFR BLD AUTO: 0.2 % — SIGNIFICANT CHANGE UP (ref 0–0.9)
IMM GRANULOCYTES NFR BLD AUTO: 0.2 % — SIGNIFICANT CHANGE UP (ref 0–0.9)
INR BLD: 0.96 RATIO — SIGNIFICANT CHANGE UP (ref 0.85–1.18)
INR BLD: 0.96 RATIO — SIGNIFICANT CHANGE UP (ref 0.85–1.18)
LYMPHOCYTES # BLD AUTO: 1.34 K/UL — SIGNIFICANT CHANGE UP (ref 1–3.3)
LYMPHOCYTES # BLD AUTO: 1.34 K/UL — SIGNIFICANT CHANGE UP (ref 1–3.3)
LYMPHOCYTES # BLD AUTO: 26.1 % — SIGNIFICANT CHANGE UP (ref 13–44)
LYMPHOCYTES # BLD AUTO: 26.1 % — SIGNIFICANT CHANGE UP (ref 13–44)
MCHC RBC-ENTMCNC: 28.2 PG — SIGNIFICANT CHANGE UP (ref 27–34)
MCHC RBC-ENTMCNC: 28.2 PG — SIGNIFICANT CHANGE UP (ref 27–34)
MCHC RBC-ENTMCNC: 32.8 GM/DL — SIGNIFICANT CHANGE UP (ref 32–36)
MCHC RBC-ENTMCNC: 32.8 GM/DL — SIGNIFICANT CHANGE UP (ref 32–36)
MCV RBC AUTO: 86 FL — SIGNIFICANT CHANGE UP (ref 80–100)
MCV RBC AUTO: 86 FL — SIGNIFICANT CHANGE UP (ref 80–100)
MONOCYTES # BLD AUTO: 0.33 K/UL — SIGNIFICANT CHANGE UP (ref 0–0.9)
MONOCYTES # BLD AUTO: 0.33 K/UL — SIGNIFICANT CHANGE UP (ref 0–0.9)
MONOCYTES NFR BLD AUTO: 6.4 % — SIGNIFICANT CHANGE UP (ref 2–14)
MONOCYTES NFR BLD AUTO: 6.4 % — SIGNIFICANT CHANGE UP (ref 2–14)
NEUTROPHILS # BLD AUTO: 3.29 K/UL — SIGNIFICANT CHANGE UP (ref 1.8–7.4)
NEUTROPHILS # BLD AUTO: 3.29 K/UL — SIGNIFICANT CHANGE UP (ref 1.8–7.4)
NEUTROPHILS NFR BLD AUTO: 64 % — SIGNIFICANT CHANGE UP (ref 43–77)
NEUTROPHILS NFR BLD AUTO: 64 % — SIGNIFICANT CHANGE UP (ref 43–77)
PLATELET # BLD AUTO: 238 K/UL — SIGNIFICANT CHANGE UP (ref 150–400)
PLATELET # BLD AUTO: 238 K/UL — SIGNIFICANT CHANGE UP (ref 150–400)
POTASSIUM SERPL-MCNC: 4.7 MMOL/L — SIGNIFICANT CHANGE UP (ref 3.5–5.3)
POTASSIUM SERPL-MCNC: 4.7 MMOL/L — SIGNIFICANT CHANGE UP (ref 3.5–5.3)
POTASSIUM SERPL-SCNC: 4.7 MMOL/L — SIGNIFICANT CHANGE UP (ref 3.5–5.3)
POTASSIUM SERPL-SCNC: 4.7 MMOL/L — SIGNIFICANT CHANGE UP (ref 3.5–5.3)
PROTHROM AB SERPL-ACNC: 10.9 SEC — SIGNIFICANT CHANGE UP (ref 9.5–13)
PROTHROM AB SERPL-ACNC: 10.9 SEC — SIGNIFICANT CHANGE UP (ref 9.5–13)
RBC # BLD: 4.5 M/UL — SIGNIFICANT CHANGE UP (ref 3.8–5.2)
RBC # BLD: 4.5 M/UL — SIGNIFICANT CHANGE UP (ref 3.8–5.2)
RBC # FLD: 13.3 % — SIGNIFICANT CHANGE UP (ref 10.3–14.5)
RBC # FLD: 13.3 % — SIGNIFICANT CHANGE UP (ref 10.3–14.5)
SODIUM SERPL-SCNC: 143 MMOL/L — SIGNIFICANT CHANGE UP (ref 135–145)
SODIUM SERPL-SCNC: 143 MMOL/L — SIGNIFICANT CHANGE UP (ref 135–145)
WBC # BLD: 5.14 K/UL — SIGNIFICANT CHANGE UP (ref 3.8–10.5)
WBC # BLD: 5.14 K/UL — SIGNIFICANT CHANGE UP (ref 3.8–10.5)
WBC # FLD AUTO: 5.14 K/UL — SIGNIFICANT CHANGE UP (ref 3.8–10.5)
WBC # FLD AUTO: 5.14 K/UL — SIGNIFICANT CHANGE UP (ref 3.8–10.5)

## 2023-11-28 PROCEDURE — 99214 OFFICE O/P EST MOD 30 MIN: CPT | Mod: 25

## 2023-11-28 PROCEDURE — 85025 COMPLETE CBC W/AUTO DIFF WBC: CPT

## 2023-11-28 PROCEDURE — 85730 THROMBOPLASTIN TIME PARTIAL: CPT

## 2023-11-28 PROCEDURE — 93010 ELECTROCARDIOGRAM REPORT: CPT

## 2023-11-28 PROCEDURE — 36415 COLL VENOUS BLD VENIPUNCTURE: CPT

## 2023-11-28 PROCEDURE — 85610 PROTHROMBIN TIME: CPT

## 2023-11-28 PROCEDURE — 93005 ELECTROCARDIOGRAM TRACING: CPT

## 2023-11-28 PROCEDURE — 80048 BASIC METABOLIC PNL TOTAL CA: CPT

## 2023-11-28 PROCEDURE — 83036 HEMOGLOBIN GLYCOSYLATED A1C: CPT

## 2023-11-28 RX ORDER — DULOXETINE HYDROCHLORIDE 30 MG/1
1 CAPSULE, DELAYED RELEASE ORAL
Qty: 0 | Refills: 0 | DISCHARGE

## 2023-11-28 NOTE — H&P PST ADULT - NSICDXFAMILYHX_GEN_ALL_CORE_FT
FAMILY HISTORY:  Mother  Still living? No  Family history of COPD (chronic obstructive pulmonary disease), Age at diagnosis: Age Unknown  Family history of heart attack, Age at diagnosis: Age Unknown  Family history of heart disease, Age at diagnosis: Age Unknown

## 2023-11-28 NOTE — H&P PST ADULT - ASSESSMENT
68 years old female present to PST prior to endoscopic ultrasound with Dr. Finley.    Plan  1. Expect a call from Endoscopy the day before your procedure between 11am and 3pm.  2. Follow the GI doctor's instructions for preparation  and day before procedure activities.  3. Follow the GI doctor's  instructions for medications.   4. CBC, BMP, HGB AIC, PT/INR, PTT and EKG done in PST

## 2023-11-28 NOTE — H&P PST ADULT - BIRTH SEX
Female
Abdomen soft, non-tender and non-distended, no rebound, no guarding and no masses. no hepatosplenomegaly.

## 2023-11-28 NOTE — H&P PST ADULT - HISTORY OF PRESENT ILLNESS
68 years old female with abnormal imaging. Incidental finding. Last Upper Endoscopy and colonoscopy done 9/ 2022"They saw something in my stomach." Planned endoscopic ultrasound.

## 2023-11-28 NOTE — H&P PST ADULT - NSICDXPASTMEDICALHX_GEN_ALL_CORE_FT
PAST MEDICAL HISTORY:  Anxiety     Cervical herniated disc     Constipation     DDD (degenerative disc disease), cervical     DDD (degenerative disc disease), lumbar     DDD (degenerative disc disease), thoracic     Fatty liver     History of gallstones     History of left breast cancer     Hyperlipidemia     Hypertension     Imaging abnormality     Lumbar herniated disc     Prediabetes     Sciatica

## 2023-11-28 NOTE — H&P PST ADULT - NSICDXPASTSURGICALHX_GEN_ALL_CORE_FT
PAST SURGICAL HISTORY:  History of cholecystectomy     History of lumpectomy of left breast     History of nasal septoplasty     S/P excision of lipoma     Status post epidural steroid injection     Status post excision of vocal cord nodule     Status post left foot surgery

## 2023-11-29 DIAGNOSIS — R93.3 ABNORMAL FINDINGS ON DIAGNOSTIC IMAGING OF OTHER PARTS OF DIGESTIVE TRACT: ICD-10-CM

## 2023-12-04 ENCOUNTER — OUTPATIENT (OUTPATIENT)
Dept: INPATIENT UNIT | Facility: HOSPITAL | Age: 68
LOS: 1 days | Discharge: HOME | End: 2023-12-04
Payer: MEDICARE

## 2023-12-04 ENCOUNTER — APPOINTMENT (OUTPATIENT)
Dept: GASTROENTEROLOGY | Facility: HOSPITAL | Age: 68
End: 2023-12-04

## 2023-12-04 ENCOUNTER — RESULT REVIEW (OUTPATIENT)
Age: 68
End: 2023-12-04

## 2023-12-04 ENCOUNTER — TRANSCRIPTION ENCOUNTER (OUTPATIENT)
Age: 68
End: 2023-12-04

## 2023-12-04 VITALS
OXYGEN SATURATION: 100 % | SYSTOLIC BLOOD PRESSURE: 97 MMHG | RESPIRATION RATE: 16 BRPM | HEART RATE: 73 BPM | DIASTOLIC BLOOD PRESSURE: 67 MMHG

## 2023-12-04 VITALS
SYSTOLIC BLOOD PRESSURE: 121 MMHG | HEIGHT: 62 IN | HEART RATE: 80 BPM | OXYGEN SATURATION: 100 % | DIASTOLIC BLOOD PRESSURE: 83 MMHG | RESPIRATION RATE: 14 BRPM | TEMPERATURE: 97 F | WEIGHT: 138.89 LBS

## 2023-12-04 DIAGNOSIS — Z98.890 OTHER SPECIFIED POSTPROCEDURAL STATES: Chronic | ICD-10-CM

## 2023-12-04 DIAGNOSIS — Z90.49 ACQUIRED ABSENCE OF OTHER SPECIFIED PARTS OF DIGESTIVE TRACT: Chronic | ICD-10-CM

## 2023-12-04 DIAGNOSIS — Z92.241 PERSONAL HISTORY OF SYSTEMIC STEROID THERAPY: Chronic | ICD-10-CM

## 2023-12-04 DIAGNOSIS — R93.3 ABNORMAL FINDINGS ON DIAGNOSTIC IMAGING OF OTHER PARTS OF DIGESTIVE TRACT: ICD-10-CM

## 2023-12-04 PROCEDURE — 43259 EGD US EXAM DUODENUM/JEJUNUM: CPT

## 2023-12-04 PROCEDURE — 88342 IMHCHEM/IMCYTCHM 1ST ANTB: CPT | Mod: 26

## 2023-12-04 PROCEDURE — 88312 SPECIAL STAINS GROUP 1: CPT | Mod: 26

## 2023-12-04 PROCEDURE — 88305 TISSUE EXAM BY PATHOLOGIST: CPT | Mod: 26

## 2023-12-04 PROCEDURE — 88342 IMHCHEM/IMCYTCHM 1ST ANTB: CPT

## 2023-12-04 PROCEDURE — 88312 SPECIAL STAINS GROUP 1: CPT

## 2023-12-04 PROCEDURE — 43239 EGD BIOPSY SINGLE/MULTIPLE: CPT

## 2023-12-04 PROCEDURE — 88305 TISSUE EXAM BY PATHOLOGIST: CPT

## 2023-12-04 NOTE — ASU PATIENT PROFILE, ADULT - NSALCOHOLPROBLEMSRELYN_GEN_A_CORE_SD
Continue using your albuterol inhaler. Schedule follow-up with your pulmonologist.  If symptoms are worsening return back to the emergency department. no

## 2023-12-04 NOTE — ASU PATIENT PROFILE, ADULT - FALL HARM RISK - UNIVERSAL INTERVENTIONS
Bed in lowest position, wheels locked, appropriate side rails in place/Call bell, personal items and telephone in reach/Instruct patient to call for assistance before getting out of bed or chair/Non-slip footwear when patient is out of bed/[place] to call system/Physically safe environment - no spills, clutter or unnecessary equipment/Purposeful Proactive Rounding/Room/bathroom lighting operational, light cord in reach Bed in lowest position, wheels locked, appropriate side rails in place/Call bell, personal items and telephone in reach/Instruct patient to call for assistance before getting out of bed or chair/Non-slip footwear when patient is out of bed/[place] to call system/Physically safe environment - no spills, clutter or unnecessary equipment/Purposeful Proactive Rounding/Room/bathroom lighting operational, light cord in reach

## 2023-12-04 NOTE — ASU DISCHARGE PLAN (ADULT/PEDIATRIC) - NS MD DC FALL RISK RISK
For information on Fall & Injury Prevention, visit: [email or web address] OR  [email or web address] OR  https://www.cdc.gov/steadi/patient.html For information on Fall & Injury Prevention, visit: [email or web address] OR  [email or web address] OR  https://www.cdc.gov/steadi/patient.html

## 2023-12-06 LAB
SURGICAL PATHOLOGY STUDY: SIGNIFICANT CHANGE UP
SURGICAL PATHOLOGY STUDY: SIGNIFICANT CHANGE UP

## 2023-12-07 DIAGNOSIS — K29.50 UNSPECIFIED CHRONIC GASTRITIS WITHOUT BLEEDING: ICD-10-CM

## 2023-12-07 DIAGNOSIS — I10 ESSENTIAL (PRIMARY) HYPERTENSION: ICD-10-CM

## 2023-12-07 DIAGNOSIS — K83.8 OTHER SPECIFIED DISEASES OF BILIARY TRACT: ICD-10-CM

## 2023-12-07 DIAGNOSIS — E78.5 HYPERLIPIDEMIA, UNSPECIFIED: ICD-10-CM

## 2023-12-07 DIAGNOSIS — K25.9 GASTRIC ULCER, UNSPECIFIED AS ACUTE OR CHRONIC, WITHOUT HEMORRHAGE OR PERFORATION: ICD-10-CM

## 2023-12-11 DIAGNOSIS — K25.4 CHRONIC OR UNSPECIFIED GASTRIC ULCER WITH HEMORRHAGE: ICD-10-CM

## 2023-12-11 DIAGNOSIS — K25.7 CHRONIC GASTRIC ULCER W/OUT HEMORRHAGE OR PERFORATION: ICD-10-CM

## 2023-12-11 RX ORDER — OMEPRAZOLE 40 MG/1
40 CAPSULE, DELAYED RELEASE ORAL
Qty: 90 | Refills: 0 | Status: ACTIVE | COMMUNITY
Start: 2023-12-11 | End: 1900-01-01

## 2023-12-12 PROBLEM — M50.20 OTHER CERVICAL DISC DISPLACEMENT, UNSPECIFIED CERVICAL REGION: Chronic | Status: ACTIVE | Noted: 2023-11-28

## 2023-12-12 PROBLEM — M54.30 SCIATICA, UNSPECIFIED SIDE: Chronic | Status: ACTIVE | Noted: 2023-11-28

## 2023-12-12 PROBLEM — F41.9 ANXIETY DISORDER, UNSPECIFIED: Chronic | Status: ACTIVE | Noted: 2023-11-28

## 2023-12-12 PROBLEM — Z87.19 PERSONAL HISTORY OF OTHER DISEASES OF THE DIGESTIVE SYSTEM: Chronic | Status: ACTIVE | Noted: 2023-11-28

## 2023-12-12 PROBLEM — M51.36 OTHER INTERVERTEBRAL DISC DEGENERATION, LUMBAR REGION: Chronic | Status: ACTIVE | Noted: 2023-11-28

## 2023-12-12 PROBLEM — K59.00 CONSTIPATION, UNSPECIFIED: Chronic | Status: ACTIVE | Noted: 2023-11-28

## 2023-12-12 PROBLEM — M51.26 OTHER INTERVERTEBRAL DISC DISPLACEMENT, LUMBAR REGION: Chronic | Status: ACTIVE | Noted: 2023-11-28

## 2023-12-12 PROBLEM — M50.30 OTHER CERVICAL DISC DEGENERATION, UNSPECIFIED CERVICAL REGION: Chronic | Status: ACTIVE | Noted: 2023-11-28

## 2023-12-12 PROBLEM — M51.34 OTHER INTERVERTEBRAL DISC DEGENERATION, THORACIC REGION: Chronic | Status: ACTIVE | Noted: 2023-11-28

## 2023-12-13 PROBLEM — R73.03 PREDIABETES: Chronic | Status: ACTIVE | Noted: 2023-11-28

## 2023-12-13 PROBLEM — Z85.3 PERSONAL HISTORY OF MALIGNANT NEOPLASM OF BREAST: Chronic | Status: ACTIVE | Noted: 2023-11-28

## 2023-12-13 PROBLEM — K76.0 FATTY (CHANGE OF) LIVER, NOT ELSEWHERE CLASSIFIED: Chronic | Status: ACTIVE | Noted: 2023-11-28

## 2023-12-13 PROBLEM — R93.89 ABNORMAL FINDINGS ON DIAGNOSTIC IMAGING OF OTHER SPECIFIED BODY STRUCTURES: Chronic | Status: ACTIVE | Noted: 2023-11-28

## 2024-02-16 ENCOUNTER — RESULT REVIEW (OUTPATIENT)
Age: 69
End: 2024-02-16

## 2024-02-16 ENCOUNTER — APPOINTMENT (OUTPATIENT)
Dept: GASTROENTEROLOGY | Facility: HOSPITAL | Age: 69
End: 2024-02-16
Payer: MEDICARE

## 2024-02-16 ENCOUNTER — OUTPATIENT (OUTPATIENT)
Dept: OUTPATIENT SERVICES | Facility: HOSPITAL | Age: 69
LOS: 1 days | Discharge: HOME | End: 2024-02-16
Payer: MEDICARE

## 2024-02-16 VITALS — HEIGHT: 63 IN | WEIGHT: 143.08 LBS

## 2024-02-16 DIAGNOSIS — K25.7 CHRONIC GASTRIC ULCER WITHOUT HEMORRHAGE OR PERFORATION: ICD-10-CM

## 2024-02-16 DIAGNOSIS — Z98.890 OTHER SPECIFIED POSTPROCEDURAL STATES: Chronic | ICD-10-CM

## 2024-02-16 DIAGNOSIS — Z79.85 LONG-TERM (CURRENT) USE OF INJECTABLE NON-INSULIN ANTIDIABETIC DRUGS: ICD-10-CM

## 2024-02-16 DIAGNOSIS — Z87.11 PERSONAL HISTORY OF PEPTIC ULCER DISEASE: ICD-10-CM

## 2024-02-16 DIAGNOSIS — Z85.3 PERSONAL HISTORY OF MALIGNANT NEOPLASM OF BREAST: ICD-10-CM

## 2024-02-16 DIAGNOSIS — Z90.49 ACQUIRED ABSENCE OF OTHER SPECIFIED PARTS OF DIGESTIVE TRACT: ICD-10-CM

## 2024-02-16 DIAGNOSIS — Z92.241 PERSONAL HISTORY OF SYSTEMIC STEROID THERAPY: Chronic | ICD-10-CM

## 2024-02-16 DIAGNOSIS — F41.9 ANXIETY DISORDER, UNSPECIFIED: ICD-10-CM

## 2024-02-16 DIAGNOSIS — Z09 ENCOUNTER FOR FOLLOW-UP EXAMINATION AFTER COMPLETED TREATMENT FOR CONDITIONS OTHER THAN MALIGNANT NEOPLASM: ICD-10-CM

## 2024-02-16 DIAGNOSIS — I10 ESSENTIAL (PRIMARY) HYPERTENSION: ICD-10-CM

## 2024-02-16 DIAGNOSIS — Z90.49 ACQUIRED ABSENCE OF OTHER SPECIFIED PARTS OF DIGESTIVE TRACT: Chronic | ICD-10-CM

## 2024-02-16 DIAGNOSIS — R73.03 PREDIABETES: ICD-10-CM

## 2024-02-16 DIAGNOSIS — K29.50 UNSPECIFIED CHRONIC GASTRITIS WITHOUT BLEEDING: ICD-10-CM

## 2024-02-16 DIAGNOSIS — E78.5 HYPERLIPIDEMIA, UNSPECIFIED: ICD-10-CM

## 2024-02-16 PROCEDURE — 88342 IMHCHEM/IMCYTCHM 1ST ANTB: CPT

## 2024-02-16 PROCEDURE — 88305 TISSUE EXAM BY PATHOLOGIST: CPT

## 2024-02-16 PROCEDURE — 99223 1ST HOSP IP/OBS HIGH 75: CPT

## 2024-02-16 PROCEDURE — 88312 SPECIAL STAINS GROUP 1: CPT

## 2024-02-16 PROCEDURE — 43239 EGD BIOPSY SINGLE/MULTIPLE: CPT

## 2024-02-16 PROCEDURE — 88305 TISSUE EXAM BY PATHOLOGIST: CPT | Mod: 26

## 2024-02-16 PROCEDURE — 88312 SPECIAL STAINS GROUP 1: CPT | Mod: 26

## 2024-02-16 PROCEDURE — 88342 IMHCHEM/IMCYTCHM 1ST ANTB: CPT | Mod: 26

## 2024-02-16 RX ORDER — CHOLECALCIFEROL (VITAMIN D3) 125 MCG
1 CAPSULE ORAL
Refills: 0 | DISCHARGE

## 2024-02-16 RX ORDER — ALPRAZOLAM 0.25 MG
1 TABLET ORAL
Refills: 0 | DISCHARGE

## 2024-02-16 RX ORDER — SEMAGLUTIDE 0.68 MG/ML
1 INJECTION, SOLUTION SUBCUTANEOUS
Refills: 0 | DISCHARGE

## 2024-02-16 RX ORDER — ACETAMINOPHEN 500 MG
2 TABLET ORAL
Refills: 0 | DISCHARGE

## 2024-02-16 RX ORDER — OXYCODONE AND ACETAMINOPHEN 5; 325 MG/1; MG/1
1 TABLET ORAL
Refills: 0 | DISCHARGE

## 2024-02-16 RX ORDER — ATORVASTATIN CALCIUM 80 MG/1
1 TABLET, FILM COATED ORAL
Qty: 0 | Refills: 0 | DISCHARGE

## 2024-02-16 RX ORDER — ZOLPIDEM TARTRATE 10 MG/1
1 TABLET ORAL
Refills: 0 | DISCHARGE

## 2024-02-16 RX ORDER — ATENOLOL 25 MG/1
1 TABLET ORAL
Qty: 0 | Refills: 0 | DISCHARGE

## 2024-02-16 NOTE — ASU PATIENT PROFILE, ADULT - TEACHING/LEARNING DEVELOPMENTAL CONSIDERATIONS
Jaye Burnham is a 62 year old diabetic White female who returns to my clinic for re-evaluation of her right shoulder rotator cuff tendinitis with a partial-thickness tear.  She has had some on and off right shoulder pain for several years but tells me that about 9 months ago, without obvious antecedent traumatic event or injury, she developed more significant shoulder pain.  It has been persistent. She notes anterior and anterolateral pain which radiates down her arm, but rarely below her elbow.  It is worse with certain arm positions and activities.  It is a little bit better with rest.  She has difficulty sleeping because of her shoulder pain.  She has performed physical therapy, which she feels made things worse, and Marino performed a subacromial corticosteroid injection, which gave her about 2 weeks of pain relief , but then her symptoms returned.  I performed a subacromial injection which also gave her about a month or 6 weeks of pain relief.  Her pain has returned and she wants to proceed with surgery, as we previously discussed.  She denies persistent numbness, tingling, etc distally in the right hand.    Past Medical History:   Diagnosis Date   • Cholesteatoma of ear     Right ear intermitten pain andt  inflammation   • Colon polyps 8/24/2015   • Diabetes mellitus (CMS/HCC)    • Diverticulosis of colon 8/24/2015    Mild right-sided and moderate left-sided diverticulosis.   • Elevated cholesterol with elevated triglycerides    • Family history of colon cancer 8/24/2015   • GERD (gastroesophageal reflux disease)    • Otalgia     chronic, right    • PONV (postoperative nausea and vomiting)     difficult to wake up then nauseated afterwards   • Thyroid disease      Past Surgical History:   Procedure Laterality Date   • Appendectomy     • Bladder surgery     • Colonoscopy  8/24/15    repeat 5 years-Darryl   • Colonoscopy diagnostic  01/05/2012    polypectomy    • Esophagogastroduodenoscopy  07/08/2017   •  Hysterectomy     • Laparoscopic cholecystectomy  10/12/2010    cholangiogram   • Mammo screening bilateral  05/22/2012       Current Outpatient Medications   Medication Sig Dispense Refill   • ONETOUCH DELICA LANCETS 33G Misc TEST BLOOD SUGAR DAILY IN THE MORNING OR 2 HOURS AFTER A MEAL 100 each 3   • ONETOUCH VERIO test strip TEST BLOOD SUGAR DAILY IN THE MORNING OR 2 HOURS AFTER A MEAL 100 each 3   • amitriptyline (ELAVIL) 10 MG tablet Take 1 tablet by mouth nightly. 30 tablet 6   • methylPREDNISolone (MEDROL, AREN,) 4 MG tablet Follow package directions. 21 tablet 0   • meloxicam (MOBIC) 15 MG tablet Take 1 tablet by mouth daily as needed for pain. 30 tablet 5   • tobramycin-dexamethasone (TOBRADEX ST) 0.3-0.05 % ophthalmic suspension Place 4 drops into right ear 2 times daily as needed for ear pain or drainage. 5 mL 2   • metFORMIN (GLUCOPHAGE-XR) 500 MG 24 hr tablet Take 2 tablets by mouth every morning AND 1 tablet every evening. 270 tablet 3   • colesevelam (WELCHOL) 625 MG tablet Take 1 tablet by mouth 2 times daily (with meals). 270 tablet 3   • Medium Chain Triglycerides (MCT OIL PO)      • Vitamin D, Ergocalciferol, 07521 units capsule TAKE 1 CAPSULE 1 DAY A WEEK 15 capsule 4   • levothyroxine (SYNTHROID, LEVOTHROID) 50 MCG tablet Take 1 tablet by mouth daily. 90 tablet 3   • zoster vaccine recomb adjuvanted (SHINGRIX) 50 MCG/0.5ML injection Inject 0.5 mLs into the muscle 1 time. Indications: Second dose to be administered 2-6 months after the initial dose. Repeat dose in 2 to 6 months (unless 1 dose already given), for a total of 2 doses. 1 each 1   • omeprazole (PRILOSEC OTC) 20 MG tablet Take 20 mg by mouth daily.     • ALPRAZolam (XANAX) 0.25 MG tablet Take 1 tablet by mouth 3 times daily as needed for anxiety. 30 tablet 1   • DISPENSE Hemp CBD oil,  5 sprays daily     • DISPENSE Omega XL, 2 daily     • Omega-3 Fatty Acids (OMEGA 3 PO)      • lidocaine viscous (XYLOCAINE) 2 % solution Place 5 to 10  drops into right ear as needed for pain 100 mL 3   • Loratadine 10 MG Cap Take 1 capsule by mouth daily. (Patient taking differently: Take 1 capsule by mouth daily as needed. ) 20 capsule 0   • Blood Glucose Monitoring Suppl (ONETOUCH VERIO IQ SYSTEM) W/DEVICE Kit 1 each by Other route daily. 1 kit 0   • sharps container Daily for lancets 1 each 2     No current facility-administered medications for this visit.      ALLERGIES:  Aminoglycosides; Augmentin [amoclan]; Bacitracin-neomycin-polymyxin [neomycin-bacitracin-polymyxin]; Ciprofloxacin; Duricef [cefadroxil]; Quinine; Sulfa antibiotics; and Tape [adhesive   (environmental)]    I have reviewed the patient's past medical, surgical, social and family history, updating these as appropriate. See Histories section of the EMR (electronic medical record) for a display of this information.    Examination of the right shoulder reveals  Active range of motion is 0-160° of flexion and abduction with pain at the extremes, 60° of external rotation, and internal rotation to L4, with moderate pain. Motor testing reveals 4+/5 strength for supraspinatus and infraspinatus with pain and 5-/ 5 subscapular strength with minimal discomfort. The patient has good neck motion with a negative Spurling sign and a grossly intact sensory and motor examination distally in the upper extremity.    Previous x-rays and the MRI arthrogram of her right shoulder reveal rotator cuff tendinitis/tendinopathy with partial-thickness tear.    Assessment/plan:  Chronic, recalcitrant, right shoulder rotator cuff tendinitis/tendinopathy with a partial thickness tear.  I discussed this with the patient and we talked about options of continued conservative treatment versus repeat subacromial injection versus right shoulder arthroscopy with arthroscopic acromioplasty and debridement or repair of her rotator cuff.  We talked about the pros and cons of each of these treatment options.  She wishes to proceed with  none surgery.  The risks, benefits, alternatives, and complications of the surgical procedure, anesthetic, and recovery were discussed with the patient and they wish to proceed. We will schedule the patient for this procedure and ask their primary care physician to see them in consultation for a preoperative evaluation. Greater than 50% of the 30 minutes spent with the patient today was in counseling.

## 2024-02-22 LAB — SURGICAL PATHOLOGY STUDY: SIGNIFICANT CHANGE UP

## 2024-02-26 NOTE — ED STATDOCS - ATTENDING CONTRIBUTION TO CARE
I personally saw the patient with the ACP, and completed the key components of the history and physical exam. I then discussed the management plan with the ACP. no

## 2024-02-27 DIAGNOSIS — K27.9 PEPTIC ULCER, SITE UNSPECIFIED, UNSPECIFIED AS ACUTE OR CHRONIC, W/OUT HEMORRHAGE OR PERFORATION: ICD-10-CM

## 2024-03-06 ENCOUNTER — APPOINTMENT (OUTPATIENT)
Dept: RADIOLOGY | Facility: MEDICAL CENTER | Age: 69
DRG: 603 | End: 2024-03-06
Attending: EMERGENCY MEDICINE
Payer: COMMERCIAL

## 2024-03-06 ENCOUNTER — APPOINTMENT (OUTPATIENT)
Dept: RADIOLOGY | Facility: MEDICAL CENTER | Age: 69
DRG: 603 | End: 2024-03-06
Payer: COMMERCIAL

## 2024-03-06 ENCOUNTER — HOSPITAL ENCOUNTER (INPATIENT)
Facility: MEDICAL CENTER | Age: 69
LOS: 2 days | DRG: 603 | End: 2024-03-08
Attending: EMERGENCY MEDICINE | Admitting: HOSPITALIST
Payer: COMMERCIAL

## 2024-03-06 DIAGNOSIS — L02.11 NECK ABSCESS: ICD-10-CM

## 2024-03-06 DIAGNOSIS — E11.9 TYPE 2 DIABETES MELLITUS WITHOUT COMPLICATION, WITHOUT LONG-TERM CURRENT USE OF INSULIN (HCC): ICD-10-CM

## 2024-03-06 DIAGNOSIS — A41.9 SEPSIS, DUE TO UNSPECIFIED ORGANISM, UNSPECIFIED WHETHER ACUTE ORGAN DYSFUNCTION PRESENT (HCC): ICD-10-CM

## 2024-03-06 PROBLEM — E87.6 HYPOKALEMIA: Status: ACTIVE | Noted: 2024-03-06

## 2024-03-06 PROBLEM — L02.01 ABSCESS OF CHIN: Status: ACTIVE | Noted: 2024-03-06

## 2024-03-06 PROBLEM — K02.9 DENTAL DECAY: Status: ACTIVE | Noted: 2024-03-06

## 2024-03-06 PROBLEM — E87.20 METABOLIC ACIDOSIS: Status: ACTIVE | Noted: 2024-03-06

## 2024-03-06 PROBLEM — Z72.0 TOBACCO ABUSE: Status: ACTIVE | Noted: 2024-03-06

## 2024-03-06 LAB
ALBUMIN SERPL BCP-MCNC: 3.4 G/DL (ref 3.2–4.9)
ALBUMIN/GLOB SERPL: 0.9 G/DL
ALP SERPL-CCNC: 141 U/L (ref 30–99)
ALT SERPL-CCNC: 15 U/L (ref 2–50)
ANION GAP SERPL CALC-SCNC: 18 MMOL/L (ref 7–16)
AST SERPL-CCNC: 9 U/L (ref 12–45)
BASOPHILS # BLD AUTO: 0.2 % (ref 0–1.8)
BASOPHILS # BLD: 0.06 K/UL (ref 0–0.12)
BILIRUB SERPL-MCNC: 0.3 MG/DL (ref 0.1–1.5)
BUN SERPL-MCNC: 13 MG/DL (ref 8–22)
CALCIUM ALBUM COR SERPL-MCNC: 9.7 MG/DL (ref 8.5–10.5)
CALCIUM SERPL-MCNC: 9.2 MG/DL (ref 8.5–10.5)
CHLORIDE SERPL-SCNC: 106 MMOL/L (ref 96–112)
CO2 SERPL-SCNC: 15 MMOL/L (ref 20–33)
CREAT SERPL-MCNC: 0.71 MG/DL (ref 0.5–1.4)
CRP SERPL HS-MCNC: 34.9 MG/DL (ref 0–0.75)
EKG IMPRESSION: NORMAL
EOSINOPHIL # BLD AUTO: 0.06 K/UL (ref 0–0.51)
EOSINOPHIL NFR BLD: 0.2 % (ref 0–6.9)
ERYTHROCYTE [DISTWIDTH] IN BLOOD BY AUTOMATED COUNT: 56.4 FL (ref 35.9–50)
ERYTHROCYTE [SEDIMENTATION RATE] IN BLOOD BY WESTERGREN METHOD: 65 MM/HOUR (ref 0–25)
GFR SERPLBLD CREATININE-BSD FMLA CKD-EPI: 93 ML/MIN/1.73 M 2
GLOBULIN SER CALC-MCNC: 3.9 G/DL (ref 1.9–3.5)
GLUCOSE SERPL-MCNC: 134 MG/DL (ref 65–99)
HCT VFR BLD AUTO: 33.1 % (ref 37–47)
HGB BLD-MCNC: 11.4 G/DL (ref 12–16)
IMM GRANULOCYTES # BLD AUTO: 0.31 K/UL (ref 0–0.11)
IMM GRANULOCYTES NFR BLD AUTO: 1.2 % (ref 0–0.9)
LACTATE SERPL-SCNC: 1.4 MMOL/L (ref 0.5–2)
LYMPHOCYTES # BLD AUTO: 1.65 K/UL (ref 1–4.8)
LYMPHOCYTES NFR BLD: 6.4 % (ref 22–41)
MAGNESIUM SERPL-MCNC: 1.6 MG/DL (ref 1.5–2.5)
MCH RBC QN AUTO: 28.4 PG (ref 27–33)
MCHC RBC AUTO-ENTMCNC: 34.4 G/DL (ref 32.2–35.5)
MCV RBC AUTO: 82.3 FL (ref 81.4–97.8)
MONOCYTES # BLD AUTO: 1.23 K/UL (ref 0–0.85)
MONOCYTES NFR BLD AUTO: 4.7 % (ref 0–13.4)
NEUTROPHILS # BLD AUTO: 22.6 K/UL (ref 1.82–7.42)
NEUTROPHILS NFR BLD: 87.3 % (ref 44–72)
NRBC # BLD AUTO: 0 K/UL
NRBC BLD-RTO: 0 /100 WBC (ref 0–0.2)
PLATELET # BLD AUTO: 651 K/UL (ref 164–446)
PMV BLD AUTO: 8.3 FL (ref 9–12.9)
POTASSIUM SERPL-SCNC: 3.4 MMOL/L (ref 3.6–5.5)
PROT SERPL-MCNC: 7.3 G/DL (ref 6–8.2)
RBC # BLD AUTO: 4.02 M/UL (ref 4.2–5.4)
SODIUM SERPL-SCNC: 139 MMOL/L (ref 135–145)
WBC # BLD AUTO: 25.9 K/UL (ref 4.8–10.8)

## 2024-03-06 PROCEDURE — 86140 C-REACTIVE PROTEIN: CPT

## 2024-03-06 PROCEDURE — 93005 ELECTROCARDIOGRAM TRACING: CPT | Performed by: HOSPITALIST

## 2024-03-06 PROCEDURE — 700111 HCHG RX REV CODE 636 W/ 250 OVERRIDE (IP): Performed by: EMERGENCY MEDICINE

## 2024-03-06 PROCEDURE — 700111 HCHG RX REV CODE 636 W/ 250 OVERRIDE (IP): Performed by: HOSPITALIST

## 2024-03-06 PROCEDURE — 71045 X-RAY EXAM CHEST 1 VIEW: CPT

## 2024-03-06 PROCEDURE — 36415 COLL VENOUS BLD VENIPUNCTURE: CPT

## 2024-03-06 PROCEDURE — 83735 ASSAY OF MAGNESIUM: CPT

## 2024-03-06 PROCEDURE — 99285 EMERGENCY DEPT VISIT HI MDM: CPT

## 2024-03-06 PROCEDURE — 85652 RBC SED RATE AUTOMATED: CPT

## 2024-03-06 PROCEDURE — 70491 CT SOFT TISSUE NECK W/DYE: CPT

## 2024-03-06 PROCEDURE — 96366 THER/PROPH/DIAG IV INF ADDON: CPT

## 2024-03-06 PROCEDURE — 770001 HCHG ROOM/CARE - MED/SURG/GYN PRIV*

## 2024-03-06 PROCEDURE — 87205 SMEAR GRAM STAIN: CPT

## 2024-03-06 PROCEDURE — 80053 COMPREHEN METABOLIC PANEL: CPT

## 2024-03-06 PROCEDURE — 87077 CULTURE AEROBIC IDENTIFY: CPT

## 2024-03-06 PROCEDURE — 700102 HCHG RX REV CODE 250 W/ 637 OVERRIDE(OP): Performed by: HOSPITALIST

## 2024-03-06 PROCEDURE — 87070 CULTURE OTHR SPECIMN AEROBIC: CPT

## 2024-03-06 PROCEDURE — 700117 HCHG RX CONTRAST REV CODE 255: Performed by: EMERGENCY MEDICINE

## 2024-03-06 PROCEDURE — 96367 TX/PROPH/DG ADDL SEQ IV INF: CPT

## 2024-03-06 PROCEDURE — 700105 HCHG RX REV CODE 258: Performed by: HOSPITALIST

## 2024-03-06 PROCEDURE — A9270 NON-COVERED ITEM OR SERVICE: HCPCS | Performed by: HOSPITALIST

## 2024-03-06 PROCEDURE — 96365 THER/PROPH/DIAG IV INF INIT: CPT

## 2024-03-06 PROCEDURE — 87641 MR-STAPH DNA AMP PROBE: CPT

## 2024-03-06 PROCEDURE — 70355 PANORAMIC X-RAY OF JAWS: CPT

## 2024-03-06 PROCEDURE — 85025 COMPLETE CBC W/AUTO DIFF WBC: CPT

## 2024-03-06 PROCEDURE — 83605 ASSAY OF LACTIC ACID: CPT

## 2024-03-06 PROCEDURE — 96375 TX/PRO/DX INJ NEW DRUG ADDON: CPT

## 2024-03-06 PROCEDURE — 87040 BLOOD CULTURE FOR BACTERIA: CPT | Mod: 91

## 2024-03-06 PROCEDURE — 700105 HCHG RX REV CODE 258: Performed by: EMERGENCY MEDICINE

## 2024-03-06 RX ORDER — ATORVASTATIN CALCIUM 20 MG/1
20 TABLET, FILM COATED ORAL DAILY
Status: DISCONTINUED | OUTPATIENT
Start: 2024-03-07 | End: 2024-03-08 | Stop reason: HOSPADM

## 2024-03-06 RX ORDER — ARIPIPRAZOLE 10 MG/1
10 TABLET ORAL DAILY
Status: DISCONTINUED | OUTPATIENT
Start: 2024-03-07 | End: 2024-03-08 | Stop reason: HOSPADM

## 2024-03-06 RX ORDER — DEXTROAMPHETAMINE SACCHARATE, AMPHETAMINE ASPARTATE, DEXTROAMPHETAMINE SULFATE AND AMPHETAMINE SULFATE 3.75; 3.75; 3.75; 3.75 MG/1; MG/1; MG/1; MG/1
15 TABLET ORAL 3 TIMES DAILY PRN
COMMUNITY

## 2024-03-06 RX ORDER — ACYCLOVIR 50 MG/G
1 OINTMENT TOPICAL EVERY 4 HOURS PRN
COMMUNITY
Start: 2023-05-23 | End: 2024-05-22

## 2024-03-06 RX ORDER — NICOTINE 21 MG/24HR
21 PATCH, TRANSDERMAL 24 HOURS TRANSDERMAL
Status: DISCONTINUED | OUTPATIENT
Start: 2024-03-07 | End: 2024-03-08 | Stop reason: HOSPADM

## 2024-03-06 RX ORDER — OXYCODONE HYDROCHLORIDE 5 MG/1
5-10 TABLET ORAL
Status: DISCONTINUED | OUTPATIENT
Start: 2024-03-06 | End: 2024-03-08 | Stop reason: HOSPADM

## 2024-03-06 RX ORDER — HYDROMORPHONE HYDROCHLORIDE 1 MG/ML
0.5 INJECTION, SOLUTION INTRAMUSCULAR; INTRAVENOUS; SUBCUTANEOUS ONCE
Status: COMPLETED | OUTPATIENT
Start: 2024-03-06 | End: 2024-03-06

## 2024-03-06 RX ORDER — ACETAMINOPHEN 325 MG/1
650 TABLET ORAL EVERY 6 HOURS PRN
Status: DISCONTINUED | OUTPATIENT
Start: 2024-03-06 | End: 2024-03-08 | Stop reason: HOSPADM

## 2024-03-06 RX ORDER — LORAZEPAM 0.5 MG/1
0.5 TABLET ORAL 2 TIMES DAILY
COMMUNITY
Start: 2024-02-08

## 2024-03-06 RX ORDER — LORAZEPAM 1 MG/1
0.5 TABLET ORAL 2 TIMES DAILY
Status: DISCONTINUED | OUTPATIENT
Start: 2024-03-06 | End: 2024-03-08 | Stop reason: HOSPADM

## 2024-03-06 RX ORDER — MORPHINE SULFATE 4 MG/ML
4 INJECTION INTRAVENOUS
Status: DISCONTINUED | OUTPATIENT
Start: 2024-03-06 | End: 2024-03-08 | Stop reason: HOSPADM

## 2024-03-06 RX ORDER — BUSPIRONE HYDROCHLORIDE 30 MG/1
30 TABLET ORAL 2 TIMES DAILY
COMMUNITY

## 2024-03-06 RX ORDER — VENLAFAXINE HYDROCHLORIDE 75 MG/1
150 CAPSULE, EXTENDED RELEASE ORAL DAILY
Status: DISCONTINUED | OUTPATIENT
Start: 2024-03-07 | End: 2024-03-08 | Stop reason: HOSPADM

## 2024-03-06 RX ORDER — BUSPIRONE HYDROCHLORIDE 10 MG/1
30 TABLET ORAL 2 TIMES DAILY
Status: DISCONTINUED | OUTPATIENT
Start: 2024-03-06 | End: 2024-03-08 | Stop reason: HOSPADM

## 2024-03-06 RX ORDER — SODIUM CHLORIDE, SODIUM LACTATE, POTASSIUM CHLORIDE, CALCIUM CHLORIDE 600; 310; 30; 20 MG/100ML; MG/100ML; MG/100ML; MG/100ML
1000 INJECTION, SOLUTION INTRAVENOUS ONCE
Status: COMPLETED | OUTPATIENT
Start: 2024-03-06 | End: 2024-03-07

## 2024-03-06 RX ADMIN — HYDROMORPHONE HYDROCHLORIDE 0.5 MG: 1 INJECTION, SOLUTION INTRAMUSCULAR; INTRAVENOUS; SUBCUTANEOUS at 17:53

## 2024-03-06 RX ADMIN — LORAZEPAM 0.5 MG: 1 TABLET ORAL at 20:24

## 2024-03-06 RX ADMIN — SODIUM CHLORIDE, POTASSIUM CHLORIDE, SODIUM LACTATE AND CALCIUM CHLORIDE 1000 ML: 600; 310; 30; 20 INJECTION, SOLUTION INTRAVENOUS at 20:25

## 2024-03-06 RX ADMIN — POTASSIUM CHLORIDE: 2 INJECTION, SOLUTION, CONCENTRATE INTRAVENOUS at 22:31

## 2024-03-06 RX ADMIN — IOHEXOL 80 ML: 350 INJECTION, SOLUTION INTRAVENOUS at 17:45

## 2024-03-06 RX ADMIN — BUSPIRONE HYDROCHLORIDE 30 MG: 10 TABLET ORAL at 22:08

## 2024-03-06 RX ADMIN — VANCOMYCIN HYDROCHLORIDE 2250 MG: 5 INJECTION, POWDER, LYOPHILIZED, FOR SOLUTION INTRAVENOUS at 19:00

## 2024-03-06 RX ADMIN — AMPICILLIN AND SULBACTAM 3 G: 1; 2 INJECTION, POWDER, FOR SOLUTION INTRAMUSCULAR; INTRAVENOUS at 17:55

## 2024-03-06 RX ADMIN — NICOTINE POLACRILEX 2 MG: 2 GUM, CHEWING BUCCAL at 22:08

## 2024-03-06 RX ADMIN — MORPHINE SULFATE 4 MG: 4 INJECTION, SOLUTION INTRAMUSCULAR; INTRAVENOUS at 22:07

## 2024-03-06 ASSESSMENT — PAIN DESCRIPTION - PAIN TYPE
TYPE: ACUTE PAIN

## 2024-03-06 ASSESSMENT — ENCOUNTER SYMPTOMS
COUGH: 0
FEVER: 0
CHILLS: 0

## 2024-03-06 ASSESSMENT — FIBROSIS 4 INDEX: FIB4 SCORE: 1.05

## 2024-03-06 NOTE — ED TRIAGE NOTES
"Chief Complaint   Patient presents with    Sent by MD     Pt was sent by oral surgeon for follow up of need for tooth extraction. Pt states she has 5 teeth that need removed. 2 days ago pts lower jaw began to swell and now pt present with a large draining wound.   Denies trouble swallowing or breathing.      BP (!) 141/81   Pulse (!) 119   Temp 36.1 °C (97 °F) (Temporal)   Resp 16   Ht 1.626 m (5' 4\")   Wt 90.7 kg (200 lb)   SpO2 99%   BMI 34.33 kg/m²     Pt is alert/oriented and follows commands. Pt speaking in full sentences and responds appropriately to questions. No acute distress noted in triage and respirations are even and unlabored.      Pt placed in lobby and educated on triage process. Pt encouraged to alert staff for any changes in condition.    "

## 2024-03-07 ENCOUNTER — ANESTHESIA EVENT (OUTPATIENT)
Dept: SURGERY | Facility: MEDICAL CENTER | Age: 69
DRG: 603 | End: 2024-03-07
Payer: COMMERCIAL

## 2024-03-07 ENCOUNTER — ANESTHESIA (OUTPATIENT)
Dept: SURGERY | Facility: MEDICAL CENTER | Age: 69
DRG: 603 | End: 2024-03-07
Payer: COMMERCIAL

## 2024-03-07 LAB
ANION GAP SERPL CALC-SCNC: 13 MMOL/L (ref 7–16)
APPEARANCE UR: CLEAR
BASOPHILS # BLD AUTO: 0.2 % (ref 0–1.8)
BASOPHILS # BLD: 0.03 K/UL (ref 0–0.12)
BILIRUB UR QL STRIP.AUTO: NEGATIVE
BUN SERPL-MCNC: 9 MG/DL (ref 8–22)
CALCIUM SERPL-MCNC: 8.5 MG/DL (ref 8.5–10.5)
CHLORIDE SERPL-SCNC: 107 MMOL/L (ref 96–112)
CO2 SERPL-SCNC: 17 MMOL/L (ref 20–33)
COLOR UR: YELLOW
CREAT SERPL-MCNC: 0.54 MG/DL (ref 0.5–1.4)
EOSINOPHIL # BLD AUTO: 0.15 K/UL (ref 0–0.51)
EOSINOPHIL NFR BLD: 0.9 % (ref 0–6.9)
ERYTHROCYTE [DISTWIDTH] IN BLOOD BY AUTOMATED COUNT: 58.5 FL (ref 35.9–50)
GFR SERPLBLD CREATININE-BSD FMLA CKD-EPI: 101 ML/MIN/1.73 M 2
GLUCOSE SERPL-MCNC: 100 MG/DL (ref 65–99)
GLUCOSE UR STRIP.AUTO-MCNC: NEGATIVE MG/DL
GRAM STN SPEC: NORMAL
HCT VFR BLD AUTO: 30.5 % (ref 37–47)
HGB BLD-MCNC: 10.1 G/DL (ref 12–16)
IMM GRANULOCYTES # BLD AUTO: 0.17 K/UL (ref 0–0.11)
IMM GRANULOCYTES NFR BLD AUTO: 1 % (ref 0–0.9)
KETONES UR STRIP.AUTO-MCNC: NEGATIVE MG/DL
LEUKOCYTE ESTERASE UR QL STRIP.AUTO: NEGATIVE
LYMPHOCYTES # BLD AUTO: 2.4 K/UL (ref 1–4.8)
LYMPHOCYTES NFR BLD: 13.8 % (ref 22–41)
MCH RBC QN AUTO: 27.7 PG (ref 27–33)
MCHC RBC AUTO-ENTMCNC: 33.1 G/DL (ref 32.2–35.5)
MCV RBC AUTO: 83.8 FL (ref 81.4–97.8)
MICRO URNS: NORMAL
MONOCYTES # BLD AUTO: 1.19 K/UL (ref 0–0.85)
MONOCYTES NFR BLD AUTO: 6.8 % (ref 0–13.4)
NEUTROPHILS # BLD AUTO: 13.44 K/UL (ref 1.82–7.42)
NEUTROPHILS NFR BLD: 77.3 % (ref 44–72)
NITRITE UR QL STRIP.AUTO: NEGATIVE
NRBC # BLD AUTO: 0 K/UL
NRBC BLD-RTO: 0 /100 WBC (ref 0–0.2)
PH UR STRIP.AUTO: 6 [PH] (ref 5–8)
PLATELET # BLD AUTO: 552 K/UL (ref 164–446)
PMV BLD AUTO: 8.2 FL (ref 9–12.9)
POTASSIUM SERPL-SCNC: 3.5 MMOL/L (ref 3.6–5.5)
PROT UR QL STRIP: NEGATIVE MG/DL
RBC # BLD AUTO: 3.64 M/UL (ref 4.2–5.4)
RBC UR QL AUTO: NEGATIVE
SCCMEC + MECA PNL NOSE NAA+PROBE: NEGATIVE
SIGNIFICANT IND 70042: NORMAL
SITE SITE: NORMAL
SODIUM SERPL-SCNC: 137 MMOL/L (ref 135–145)
SOURCE SOURCE: NORMAL
SP GR UR STRIP.AUTO: 1.03
UROBILINOGEN UR STRIP.AUTO-MCNC: 0.2 MG/DL
WBC # BLD AUTO: 17.4 K/UL (ref 4.8–10.8)

## 2024-03-07 PROCEDURE — 80048 BASIC METABOLIC PNL TOTAL CA: CPT

## 2024-03-07 PROCEDURE — 99233 SBSQ HOSP IP/OBS HIGH 50: CPT | Mod: 25 | Performed by: INTERNAL MEDICINE

## 2024-03-07 PROCEDURE — 700111 HCHG RX REV CODE 636 W/ 250 OVERRIDE (IP): Performed by: HOSPITALIST

## 2024-03-07 PROCEDURE — 0J910ZZ DRAINAGE OF FACE SUBCUTANEOUS TISSUE AND FASCIA, OPEN APPROACH: ICD-10-PCS | Performed by: DENTIST

## 2024-03-07 PROCEDURE — 160048 HCHG OR STATISTICAL LEVEL 1-5: Performed by: DENTIST

## 2024-03-07 PROCEDURE — 0CDXXZ1 EXTRACTION OF LOWER TOOTH, MULTIPLE, EXTERNAL APPROACH: ICD-10-PCS | Performed by: DENTIST

## 2024-03-07 PROCEDURE — 87077 CULTURE AEROBIC IDENTIFY: CPT

## 2024-03-07 PROCEDURE — 87086 URINE CULTURE/COLONY COUNT: CPT

## 2024-03-07 PROCEDURE — 99406 BEHAV CHNG SMOKING 3-10 MIN: CPT | Performed by: INTERNAL MEDICINE

## 2024-03-07 PROCEDURE — 85025 COMPLETE CBC W/AUTO DIFF WBC: CPT

## 2024-03-07 PROCEDURE — 700111 HCHG RX REV CODE 636 W/ 250 OVERRIDE (IP): Performed by: ANESTHESIOLOGY

## 2024-03-07 PROCEDURE — 700105 HCHG RX REV CODE 258: Performed by: ANESTHESIOLOGY

## 2024-03-07 PROCEDURE — 160002 HCHG RECOVERY MINUTES (STAT): Performed by: DENTIST

## 2024-03-07 PROCEDURE — 770001 HCHG ROOM/CARE - MED/SURG/GYN PRIV*

## 2024-03-07 PROCEDURE — 160035 HCHG PACU - 1ST 60 MINS PHASE I: Performed by: DENTIST

## 2024-03-07 PROCEDURE — 700101 HCHG RX REV CODE 250: Performed by: DENTIST

## 2024-03-07 PROCEDURE — 160027 HCHG SURGERY MINUTES - 1ST 30 MINS LEVEL 2: Performed by: DENTIST

## 2024-03-07 PROCEDURE — 160009 HCHG ANES TIME/MIN: Performed by: DENTIST

## 2024-03-07 PROCEDURE — 700105 HCHG RX REV CODE 258: Performed by: HOSPITALIST

## 2024-03-07 PROCEDURE — 700101 HCHG RX REV CODE 250: Performed by: ANESTHESIOLOGY

## 2024-03-07 PROCEDURE — 700102 HCHG RX REV CODE 250 W/ 637 OVERRIDE(OP): Performed by: HOSPITALIST

## 2024-03-07 PROCEDURE — 87070 CULTURE OTHR SPECIMN AEROBIC: CPT

## 2024-03-07 PROCEDURE — 87075 CULTR BACTERIA EXCEPT BLOOD: CPT

## 2024-03-07 PROCEDURE — A9270 NON-COVERED ITEM OR SERVICE: HCPCS | Performed by: HOSPITALIST

## 2024-03-07 PROCEDURE — 160038 HCHG SURGERY MINUTES - EA ADDL 1 MIN LEVEL 2: Performed by: DENTIST

## 2024-03-07 PROCEDURE — 87205 SMEAR GRAM STAIN: CPT

## 2024-03-07 PROCEDURE — 81003 URINALYSIS AUTO W/O SCOPE: CPT

## 2024-03-07 PROCEDURE — C1729 CATH, DRAINAGE: HCPCS | Performed by: DENTIST

## 2024-03-07 RX ORDER — ONDANSETRON 2 MG/ML
4 INJECTION INTRAMUSCULAR; INTRAVENOUS
Status: DISCONTINUED | OUTPATIENT
Start: 2024-03-07 | End: 2024-03-07 | Stop reason: HOSPADM

## 2024-03-07 RX ORDER — OXYCODONE HCL 5 MG/5 ML
5 SOLUTION, ORAL ORAL
Status: DISCONTINUED | OUTPATIENT
Start: 2024-03-07 | End: 2024-03-07 | Stop reason: HOSPADM

## 2024-03-07 RX ORDER — OXYCODONE HCL 5 MG/5 ML
10 SOLUTION, ORAL ORAL
Status: DISCONTINUED | OUTPATIENT
Start: 2024-03-07 | End: 2024-03-07 | Stop reason: HOSPADM

## 2024-03-07 RX ORDER — CEFAZOLIN SODIUM 1 G/3ML
INJECTION, POWDER, FOR SOLUTION INTRAMUSCULAR; INTRAVENOUS PRN
Status: DISCONTINUED | OUTPATIENT
Start: 2024-03-07 | End: 2024-03-07 | Stop reason: SURG

## 2024-03-07 RX ORDER — EPHEDRINE SULFATE 50 MG/ML
5 INJECTION, SOLUTION INTRAVENOUS
Status: DISCONTINUED | OUTPATIENT
Start: 2024-03-07 | End: 2024-03-07 | Stop reason: HOSPADM

## 2024-03-07 RX ORDER — HYDRALAZINE HYDROCHLORIDE 20 MG/ML
5 INJECTION INTRAMUSCULAR; INTRAVENOUS
Status: DISCONTINUED | OUTPATIENT
Start: 2024-03-07 | End: 2024-03-07 | Stop reason: HOSPADM

## 2024-03-07 RX ORDER — HALOPERIDOL 5 MG/ML
1 INJECTION INTRAMUSCULAR
Status: DISCONTINUED | OUTPATIENT
Start: 2024-03-07 | End: 2024-03-07 | Stop reason: HOSPADM

## 2024-03-07 RX ORDER — EPHEDRINE SULFATE 50 MG/ML
INJECTION, SOLUTION INTRAVENOUS PRN
Status: DISCONTINUED | OUTPATIENT
Start: 2024-03-07 | End: 2024-03-07 | Stop reason: SURG

## 2024-03-07 RX ORDER — HYDROMORPHONE HYDROCHLORIDE 1 MG/ML
0.4 INJECTION, SOLUTION INTRAMUSCULAR; INTRAVENOUS; SUBCUTANEOUS
Status: DISCONTINUED | OUTPATIENT
Start: 2024-03-07 | End: 2024-03-07 | Stop reason: HOSPADM

## 2024-03-07 RX ORDER — LIDOCAINE HYDROCHLORIDE 20 MG/ML
INJECTION, SOLUTION EPIDURAL; INFILTRATION; INTRACAUDAL; PERINEURAL PRN
Status: DISCONTINUED | OUTPATIENT
Start: 2024-03-07 | End: 2024-03-07 | Stop reason: SURG

## 2024-03-07 RX ORDER — ROCURONIUM BROMIDE 10 MG/ML
INJECTION, SOLUTION INTRAVENOUS PRN
Status: DISCONTINUED | OUTPATIENT
Start: 2024-03-07 | End: 2024-03-07 | Stop reason: SURG

## 2024-03-07 RX ORDER — SODIUM CHLORIDE, SODIUM LACTATE, POTASSIUM CHLORIDE, CALCIUM CHLORIDE 600; 310; 30; 20 MG/100ML; MG/100ML; MG/100ML; MG/100ML
INJECTION, SOLUTION INTRAVENOUS
Status: DISCONTINUED | OUTPATIENT
Start: 2024-03-07 | End: 2024-03-07 | Stop reason: SURG

## 2024-03-07 RX ORDER — LIDOCAINE HYDROCHLORIDE AND EPINEPHRINE BITARTRATE 20; .01 MG/ML; MG/ML
INJECTION, SOLUTION SUBCUTANEOUS
Status: DISCONTINUED | OUTPATIENT
Start: 2024-03-07 | End: 2024-03-07 | Stop reason: HOSPADM

## 2024-03-07 RX ORDER — DEXAMETHASONE SODIUM PHOSPHATE 4 MG/ML
INJECTION, SOLUTION INTRA-ARTICULAR; INTRALESIONAL; INTRAMUSCULAR; INTRAVENOUS; SOFT TISSUE PRN
Status: DISCONTINUED | OUTPATIENT
Start: 2024-03-07 | End: 2024-03-07 | Stop reason: SURG

## 2024-03-07 RX ORDER — LIDOCAINE HYDROCHLORIDE 40 MG/ML
SOLUTION TOPICAL PRN
Status: DISCONTINUED | OUTPATIENT
Start: 2024-03-07 | End: 2024-03-07 | Stop reason: SURG

## 2024-03-07 RX ORDER — HYDROMORPHONE HYDROCHLORIDE 1 MG/ML
0.1 INJECTION, SOLUTION INTRAMUSCULAR; INTRAVENOUS; SUBCUTANEOUS
Status: DISCONTINUED | OUTPATIENT
Start: 2024-03-07 | End: 2024-03-07 | Stop reason: HOSPADM

## 2024-03-07 RX ORDER — SODIUM CHLORIDE, SODIUM LACTATE, POTASSIUM CHLORIDE, CALCIUM CHLORIDE 600; 310; 30; 20 MG/100ML; MG/100ML; MG/100ML; MG/100ML
INJECTION, SOLUTION INTRAVENOUS CONTINUOUS
Status: DISCONTINUED | OUTPATIENT
Start: 2024-03-07 | End: 2024-03-07 | Stop reason: HOSPADM

## 2024-03-07 RX ORDER — HYDROMORPHONE HYDROCHLORIDE 1 MG/ML
0.2 INJECTION, SOLUTION INTRAMUSCULAR; INTRAVENOUS; SUBCUTANEOUS
Status: DISCONTINUED | OUTPATIENT
Start: 2024-03-07 | End: 2024-03-07 | Stop reason: HOSPADM

## 2024-03-07 RX ADMIN — AMPICILLIN AND SULBACTAM 3 G: 1; 2 INJECTION, POWDER, FOR SOLUTION INTRAMUSCULAR; INTRAVENOUS at 12:38

## 2024-03-07 RX ADMIN — MORPHINE SULFATE 4 MG: 4 INJECTION, SOLUTION INTRAMUSCULAR; INTRAVENOUS at 12:35

## 2024-03-07 RX ADMIN — EPHEDRINE SULFATE 10 MG: 50 INJECTION, SOLUTION INTRAVENOUS at 18:09

## 2024-03-07 RX ADMIN — FENTANYL CITRATE 50 MCG: 50 INJECTION, SOLUTION INTRAMUSCULAR; INTRAVENOUS at 18:10

## 2024-03-07 RX ADMIN — BUSPIRONE HYDROCHLORIDE 30 MG: 10 TABLET ORAL at 05:55

## 2024-03-07 RX ADMIN — MORPHINE SULFATE 4 MG: 4 INJECTION, SOLUTION INTRAMUSCULAR; INTRAVENOUS at 09:31

## 2024-03-07 RX ADMIN — VENLAFAXINE HYDROCHLORIDE 150 MG: 75 CAPSULE, EXTENDED RELEASE ORAL at 05:55

## 2024-03-07 RX ADMIN — ATORVASTATIN CALCIUM 20 MG: 20 TABLET, FILM COATED ORAL at 05:55

## 2024-03-07 RX ADMIN — PROPOFOL 50 MG: 10 INJECTION, EMULSION INTRAVENOUS at 18:21

## 2024-03-07 RX ADMIN — NICOTINE TRANSDERMAL SYSTEM 21 MG: 21 PATCH, EXTENDED RELEASE TRANSDERMAL at 05:55

## 2024-03-07 RX ADMIN — MORPHINE SULFATE 4 MG: 4 INJECTION, SOLUTION INTRAMUSCULAR; INTRAVENOUS at 02:48

## 2024-03-07 RX ADMIN — CEFAZOLIN 2 G: 1 INJECTION, POWDER, FOR SOLUTION INTRAMUSCULAR; INTRAVENOUS at 18:07

## 2024-03-07 RX ADMIN — AMPICILLIN AND SULBACTAM 3 G: 1; 2 INJECTION, POWDER, FOR SOLUTION INTRAMUSCULAR; INTRAVENOUS at 00:35

## 2024-03-07 RX ADMIN — FENTANYL CITRATE 50 MCG: 50 INJECTION, SOLUTION INTRAMUSCULAR; INTRAVENOUS at 18:04

## 2024-03-07 RX ADMIN — DEXAMETHASONE SODIUM PHOSPHATE 4 MG: 4 INJECTION INTRA-ARTICULAR; INTRALESIONAL; INTRAMUSCULAR; INTRAVENOUS; SOFT TISSUE at 18:07

## 2024-03-07 RX ADMIN — ROCURONIUM BROMIDE 50 MG: 50 INJECTION, SOLUTION INTRAVENOUS at 18:04

## 2024-03-07 RX ADMIN — SODIUM CHLORIDE, POTASSIUM CHLORIDE, SODIUM LACTATE AND CALCIUM CHLORIDE: 600; 310; 30; 20 INJECTION, SOLUTION INTRAVENOUS at 18:07

## 2024-03-07 RX ADMIN — DEXAMETHASONE SODIUM PHOSPHATE 4 MG: 4 INJECTION INTRA-ARTICULAR; INTRALESIONAL; INTRAMUSCULAR; INTRAVENOUS; SOFT TISSUE at 18:15

## 2024-03-07 RX ADMIN — LIDOCAINE HYDROCHLORIDE 60 MG: 20 INJECTION, SOLUTION EPIDURAL; INFILTRATION; INTRACAUDAL at 18:04

## 2024-03-07 RX ADMIN — OXYCODONE 10 MG: 5 TABLET ORAL at 21:35

## 2024-03-07 RX ADMIN — LORAZEPAM 0.5 MG: 1 TABLET ORAL at 05:55

## 2024-03-07 RX ADMIN — POTASSIUM CHLORIDE: 2 INJECTION, SOLUTION, CONCENTRATE INTRAVENOUS at 06:00

## 2024-03-07 RX ADMIN — ARIPIPRAZOLE 10 MG: 10 TABLET ORAL at 05:55

## 2024-03-07 RX ADMIN — AMPICILLIN AND SULBACTAM 3 G: 1; 2 INJECTION, POWDER, FOR SOLUTION INTRAMUSCULAR; INTRAVENOUS at 05:54

## 2024-03-07 RX ADMIN — PROPOFOL 150 MG: 10 INJECTION, EMULSION INTRAVENOUS at 18:04

## 2024-03-07 RX ADMIN — VANCOMYCIN HYDROCHLORIDE 1000 MG: 5 INJECTION, POWDER, LYOPHILIZED, FOR SOLUTION INTRAVENOUS at 06:34

## 2024-03-07 RX ADMIN — LIDOCAINE HYDROCHLORIDE 4 ML: 40 SOLUTION TOPICAL at 18:04

## 2024-03-07 RX ADMIN — Medication 50 MG: at 18:24

## 2024-03-07 RX ADMIN — POTASSIUM CHLORIDE: 2 INJECTION, SOLUTION, CONCENTRATE INTRAVENOUS at 13:55

## 2024-03-07 ASSESSMENT — ENCOUNTER SYMPTOMS
NAUSEA: 0
LOSS OF CONSCIOUSNESS: 0
FALLS: 0
BLOOD IN STOOL: 0
FEVER: 0
HEMOPTYSIS: 0
DIARRHEA: 0
WEAKNESS: 0
SHORTNESS OF BREATH: 0
CHILLS: 0
DIZZINESS: 0
CONSTIPATION: 0
COUGH: 0
MYALGIAS: 0
SEIZURES: 0
FOCAL WEAKNESS: 0
WHEEZING: 0
NERVOUS/ANXIOUS: 0
EYE PAIN: 0
VOMITING: 0
INSOMNIA: 0
ABDOMINAL PAIN: 0
TREMORS: 0
EYE REDNESS: 0
HEADACHES: 0
PALPITATIONS: 0

## 2024-03-07 ASSESSMENT — COGNITIVE AND FUNCTIONAL STATUS - GENERAL
SUGGESTED CMS G CODE MODIFIER MOBILITY: CH
DAILY ACTIVITIY SCORE: 24
MOBILITY SCORE: 24
SUGGESTED CMS G CODE MODIFIER DAILY ACTIVITY: CH

## 2024-03-07 ASSESSMENT — PAIN DESCRIPTION - PAIN TYPE
TYPE: ACUTE PAIN
TYPE: SURGICAL PAIN
TYPE: ACUTE PAIN
TYPE: SURGICAL PAIN;ACUTE PAIN
TYPE: ACUTE PAIN

## 2024-03-07 ASSESSMENT — PAIN SCALES - GENERAL: PAIN_LEVEL: 2

## 2024-03-07 NOTE — PROGRESS NOTES
Report received from ER RNOliva at 2030.  Patient arrived to T422 via gurney by transport at 2055.  Assessment complete.    A&O x 4. Patient calls appropriately.  Patient ambulates with standby assist.   Patient has 3/10 pain. No pharmacological interventions provided at this time.   Denies N&V. Tolerating diabetic diet. Patient to be NPO at midnight.  Chin abscess with drainage and swelling, dry gauze, dry rolled gauze and mask used to absorb drainage at this time.   + void, + flatus, + BM, last BM 3/6 PTA.  Patient denies SOB.  SCD's off, patient ambulatory.    Review plan with of care with patient. Call light and personal belongings within reach. Hourly rounding in place. All needs met at this time.

## 2024-03-07 NOTE — ASSESSMENT & PLAN NOTE
With hyperglycemia glucose of 134  She is on metformin as an outpatient  Recent hemoglobin A1c 4 months ago was 6.5  Hold off on insulin for now as she will be n.p.o. at midnight    Stable BGs

## 2024-03-07 NOTE — ASSESSMENT & PLAN NOTE
Bicarb is low at 15 and anion gap is high at 18  Secondary to sepsis and dehydration  IV fluids have been ordered with potassium  Basic metabolic panel ordered for the morning

## 2024-03-07 NOTE — ASSESSMENT & PLAN NOTE
Very large abscess of the chin that is purulent with surrounding cellulitis likely due to underlying dental decay  White blood cell count is 25,000  Dr. Sanchez oral surgery has been consulted and recommends n.p.o. midnight for surgery in the morning  IV Unasyn and vancomycin  The abscess will be cultured in the emergency room given the copious purulent discharge    Dr. Sanchez plans surgery TODAY  COntinue antibiotics. WBC 25-17K. Wound culture streptococcus intermedius sensitivities PENDING.

## 2024-03-07 NOTE — ASSESSMENT & PLAN NOTE
Cessation discussed  Nicotine patch offered     I spent 4 minutes, discussing tobacco dependence and cardiac as well as pulmonary risk. Smoking cessation instructions. Code 97174  I also mentioned smoking retards healing.

## 2024-03-07 NOTE — ED PROVIDER NOTES
ED Provider Note    CHIEF COMPLAINT  Chief Complaint   Patient presents with    Sent by MD     Pt was sent by oral surgeon for follow up of need for tooth extraction. Pt states she has 5 teeth that need removed. 2 days ago pts lower jaw began to swell and now pt present with a large draining wound.   Denies trouble swallowing or breathing.        EXTERNAL RECORDS REVIEWED  Outpatient Notes dental visit in February 2024 with noted dental pain, noted nonrestorable lower teeth with discussion for extractions    HPI/ROS  LIMITATION TO HISTORY   Select: : None  OUTSIDE HISTORIAN(S):  none    Alesia Cesar is a 68 y.o. female who presents with pain and swelling under her jaw.  Patient reports she has significant dental issues, she only has 5 remaining lower teeth with scheduled for tooth extraction however the last 2 days she has noticed swelling and pain below her jaw as well as some drainage.  She actually went to Dr. Yo's office today and was sent here.  She reports no fevers or chills.  She reports no difficulty breathing or swallowing.  She has no other symptoms, headaches, nausea, vomiting or diarrhea.  No abdominal pain.    PAST MEDICAL HISTORY   has a past medical history of ADD (attention deficit disorder), Anxiety, Depression, Dyslipidemia, Fibromyalgia, and Hypothyroidism.    SURGICAL HISTORY   has a past surgical history that includes cholecystectomy.    FAMILY HISTORY  Family History   Problem Relation Age of Onset    Other Sister         parkinsons disease       SOCIAL HISTORY  Social History     Tobacco Use    Smoking status: Every Day     Current packs/day: 0.50     Types: Cigarettes    Smokeless tobacco: Never   Substance and Sexual Activity    Alcohol use: No    Drug use: Not Currently     Types: Marijuana, Inhaled     Comment: cannabis oncea month    Sexual activity: Not Currently     Partners: Male       CURRENT MEDICATIONS  Home Medications       Reviewed by Jyothi Carrizales (Pharmacy  "Tech) on 03/06/24 at 1828  Med List Status: Complete     Medication Last Dose Status   acyclovir (ZOVIRAX) 5 % Ointment 1 WEEK AGO Active   amphetamine-dextroamphetamine (ADDERALL) 15 MG tablet 3/5/2024 Active   ARIPiprazole (ABILIFY) 10 MG Tab 3/5/2024 Active   atorvastatin (LIPITOR) 20 MG Tab 3/5/2024 Active   busPIRone (BUSPAR) 30 MG tablet 3/5/2024 Active   fluticasone (FLONASE) 50 MCG/ACT nasal spray 3/5/2024 Active   LORazepam (ATIVAN) 0.5 MG Tab 3/5/2024 Active   metFORMIN (GLUCOPHAGE) 850 MG Tab 3/5/2024 Active   montelukast (SINGULAIR) 10 MG Tab 3/5/2024 Active   oxybutynin (DITROPAN XL) 15 MG CR tablet 3/5/2024 Active   valacyclovir (VALTREX) 1 GM Tab 1 WEEK AGO Active   venlafaxine (EFFEXOR-XR) 150 MG extended-release capsule 3/5/2024 Active                    ALLERGIES  No Known Allergies    PHYSICAL EXAM  VITAL SIGNS: /78   Pulse 100   Temp 36.1 °C (97 °F) (Temporal)   Resp (!) 22   Ht 1.626 m (5' 4\")   Wt 90.7 kg (200 lb)   SpO2 98%   BMI 34.33 kg/m²      Pulse ox interpretation: I interpret this pulse ox as normal.  Constitutional: Alert  HENT: No signs of trauma, Bilateral external ears normal, Nose normal.  There is no trismus, patient with Apparent dental implants on her upper teeth, over the lower teeth there are only a few remaining teeth fragments, there is no lingual elevation or pooled secretions, under the neck there is abscess is noted below          Eyes: Pupils are equal and reactive, Conjunctiva normal, Non-icteric.   Neck: Normal range of motion, No tenderness, Supple, No stridor.   Cardiovascular: Regular rate and rhythm, no murmurs.   Thorax & Lungs: Normal breath sounds, No respiratory distress, No wheezing, No chest tenderness.   Abdomen: Bowel sounds normal, Soft, No tenderness, No masses, No pulsatile masses. No peritoneal signs.  Skin: Warm, Dry, No erythema, No rash.   Back: No bony tenderness, No CVA tenderness.   Extremities: Intact distal pulses, No edema, No " tenderness, No cyanosis,  Negative Elida's sign.   Musculoskeletal: Good range of motion in all major joints. No tenderness to palpation or major deformities noted.   Neurologic: Alert , Normal motor function, Normal sensory function, No focal deficits noted.   Psychiatric: Affect normal, Judgment normal, Mood normal.               DIAGNOSTIC STUDIES / PROCEDURES  Labs Reviewed   CBC WITH DIFFERENTIAL - Abnormal; Notable for the following components:       Result Value    WBC 25.9 (*)     RBC 4.02 (*)     Hemoglobin 11.4 (*)     Hematocrit 33.1 (*)     RDW 56.4 (*)     Platelet Count 651 (*)     MPV 8.3 (*)     Neutrophils-Polys 87.30 (*)     Lymphocytes 6.40 (*)     Immature Granulocytes 1.20 (*)     Neutrophils (Absolute) 22.60 (*)     Monos (Absolute) 1.23 (*)     Immature Granulocytes (abs) 0.31 (*)     All other components within normal limits   COMP METABOLIC PANEL - Abnormal; Notable for the following components:    Potassium 3.4 (*)     Co2 15 (*)     Anion Gap 18.0 (*)     Glucose 134 (*)     AST(SGOT) 9 (*)     Alkaline Phosphatase 141 (*)     Globulin 3.9 (*)     All other components within normal limits   CRP QUANTITIVE (NON-CARDIAC) - Abnormal; Notable for the following components:    Stat C-Reactive Protein 34.90 (*)     All other components within normal limits   LACTIC ACID   ESTIMATED GFR   MAGNESIUM   LACTIC ACID   LACTIC ACID   URINALYSIS   URINE CULTURE(NEW)   BLOOD CULTURE    Narrative:     Blood Cultures X2. Draw one blood culture from central line  and one blood culture peripherally. If no line present, draw  blood cultures times two peripherally from different sites.   BLOOD CULTURE   SED RATE   MRSA BY PCR (AMP)   CULTURE WOUND W/ GRAM STAIN         RADIOLOGY  I have independently interpreted the diagnostic imaging associated with this visit and am waiting the final reading from the radiologist.   My preliminary interpretation is as follows: No airway compromise  Radiologist interpretation:    CT-SOFT TISSUE NECK WITH   Final Result      1.  Large subcutaneous phlegmon involving the subcutaneous tissues beneath the mandible anteriorly.      DX-CHEST-PORTABLE (1 VIEW)   Final Result      No acute cardiac or pulmonary abnormalities are identified.      [id number]   Final Result      1.  No evidence of mandibular fracture or osseous lesion.            COURSE & MEDICAL DECISION MAKING      INITIAL ASSESSMENT, COURSE AND PLAN  Care Narrative: 5:02 PM  Patient is evaluated the bedside and chart is reviewed.  At this point she certainly appears to have a significant submandibular abscess likely rising from dental origin.  Consideration for sepsis with her tachycardia as well.  Have ordered for the sepsis bundle, as well as Unasyn and vancomycin.  IV fluids, hydromorphone for pain.  Will page oral surgery and plan for admission to the hospitalist    Patient is reevaluated and updated on all results, she is comfortable, no findings of aerodigestive compromise at this time    Case is discussed with Dr. Sanchez from oral surgery.  Will plan for drainage likely tomorrow.  Patient cleared to eat until midnight    Case discussed with Dr. Fuchs for admission to the hospital    HYDRATION: Based on the patient's presentation of Sepsis the patient was given IV fluids. IV Hydration was used because oral hydration was not as rapid as required. Upon recheck following hydration, the patient was stable.      PROBLEM LIST    # Sepsis.  Patient with tachycardia leukocytosis as well as infectious source consistent with sepsis.  She was started on sepsis protocol, IV fluids, Unasyn, vancomycin.  No hypotension or lactic acidosis. this appears to be from a dental source resulting in a soft tissue phlegmon that is quite impressive.  Oral surgery is consulted as above for drainage    # Neck phlegmon.  CT was obtained that shows no obvious large abscess although phlegmon and on exam is certainly draining purulent fluid.   This is likely odontogenic in origin given her history    # Diabetes.  History of, home medications as needed      DISPOSITION AND DISCUSSIONS  I have discussed management of the patient with the following physicians and FAISAL's: Dr. Sanchez from oral surgery    Patient is admitted in guarded condition    FINAL DIAGNOSIS  1. Sepsis, due to unspecified organism, unspecified whether acute organ dysfunction present (HCC)    2. Neck abscess    3. Type 2 diabetes mellitus without complication, without long-term current use of insulin (HCC)         CRITICAL CARE  The very real possibilty of a deterioration of this patient's condition required the highest level of my preparedness for sudden, emergent intervention.  I provided critical care services, which included medication orders, frequent reevaluations of the patient's condition and response to treatment, ordering and reviewing test results, and discussing the case with various consultants.  The critical care time associated with the care of the patient was 35 minutes. Review chart for interventions. This time is exclusive of any other billable procedures.       Electronically signed by: Adithya Banuelos M.D., 3/6/2024 5:02 PM

## 2024-03-07 NOTE — H&P
Hospital Medicine History & Physical Note    Date of Service  3/6/2024    Primary Care Physician  Immanuel Chandler M.D.    Consultants  Oral surgery    Specialist Names: Dr. Sanchez    Code Status  Full Code    Chief Complaint  Chief Complaint   Patient presents with    Sent by MD     Pt was sent by oral surgeon for follow up of need for tooth extraction. Pt states she has 5 teeth that need removed. 2 days ago pts lower jaw began to swell and now pt present with a large draining wound.   Denies trouble swallowing or breathing.        History of Presenting Illness  Alesia Cesar is a 68 y.o. female who presented 3/6/2024 with chin abscess.  Ms. Cesar has a past medical history of non-insulin-dependent diabetes mellitus as well as depression and tobacco dependence that has upper dentures but still has 5 remaining lower teeth with advanced DKA.  She developed a large submandibular swelling and redness that has become purulent therefore she presented to Dr. Pisano's oral surgery office today where she was appropriately referred to the emergency room.  Here in the emergency room she has a very large abscess that is quite purulent with a white blood cell count 25,000.  CT was done and noted below.  Cultures have been drawn from blood as well as from the purulent discharge and she will be admitted for IV vancomycin and Unasyn and n.p.o. at midnight for surgery in the morning by Dr. Sanchez.     I discussed the plan of care with her sister Marielos at bedside.    Review of Systems  Review of Systems   Constitutional:  Negative for chills and fever.   Respiratory:  Negative for cough.    Cardiovascular:  Negative for chest pain.   All other systems reviewed and are negative.      Past Medical History   has a past medical history of ADD (attention deficit disorder), Anxiety, Depression, Dyslipidemia, Fibromyalgia, and Hypothyroidism.    Surgical History   has a past surgical history that includes cholecystectomy.      Family History  family history includes Other in her sister.   Family history reviewed with patient. There is no family history that is pertinent to the chief complaint.     Social History   reports that she has been smoking cigarettes. She has never used smokeless tobacco. She reports that she does not currently use drugs after having used the following drugs: Marijuana and Inhaled. She reports that she does not drink alcohol.    Allergies  No Known Allergies    Medications  Prior to Admission Medications   Prescriptions Last Dose Informant Patient Reported? Taking?   ARIPiprazole (ABILIFY) 10 MG Tab 3/5/2024 at 1400 Patient No Yes   Sig: Take 1 tablet by mouth every day.   LORazepam (ATIVAN) 0.5 MG Tab 3/5/2024 at 1400 Patient Yes Yes   Sig: Take 0.5 mg by mouth 2 times a day.   acyclovir (ZOVIRAX) 5 % Ointment 1 WEEK AGO at PRN Patient Yes Yes   Sig: Apply 1 Application topically every four hours as needed (outbreak). To groin   amphetamine-dextroamphetamine (ADDERALL) 15 MG tablet 3/5/2024 at 1400 Patient Yes Yes   Sig: Take 15 mg by mouth 3 times a day as needed.   atorvastatin (LIPITOR) 20 MG Tab 3/5/2024 at 1400 Patient Yes Yes   Sig: Take 20 mg by mouth every day.   busPIRone (BUSPAR) 30 MG tablet 3/5/2024 at 1400 Patient Yes Yes   Sig: Take 30 mg by mouth 2 times a day.   fluticasone (FLONASE) 50 MCG/ACT nasal spray 3/5/2024 at 1400 Patient Yes Yes   Sig: Administer 1-2 Sprays into affected nostril(S) every day.   metFORMIN (GLUCOPHAGE) 850 MG Tab 3/5/2024 at 1400 Patient Yes Yes   Sig: Take 850 mg by mouth 2 Times a Day.   montelukast (SINGULAIR) 10 MG Tab 3/5/2024 at 1400 Patient Yes Yes   Sig: Take 10 mg by mouth every day.   oxybutynin (DITROPAN XL) 15 MG CR tablet 3/5/2024 at 1400 Patient Yes Yes   Sig: Take 15 mg by mouth every day.   valacyclovir (VALTREX) 1 GM Tab 1 WEEK AGO at PRN Patient Yes No   Sig: Take 1,000 mg by mouth 1 time a day as needed (outbreak).   venlafaxine (EFFEXOR-XR) 150 MG  "extended-release capsule 3/5/2024 at 1400 Patient No Yes   Sig: Take 1 capsule by mouth every day.      Facility-Administered Medications: None       Physical Exam  Temp:  [36.1 °C (97 °F)] 36.1 °C (97 °F)  Pulse:  [100-119] 100  Resp:  [15-22] 22  BP: (103-141)/(67-81) 116/78  SpO2:  [90 %-99 %] 98 %  Blood Pressure : 116/78   Temperature: 36.1 °C (97 °F)   Pulse: 100   Respiration: (!) 22   Pulse Oximetry: 98 %       Physical Exam  Vitals and nursing note reviewed.   Constitutional:       Appearance: She is ill-appearing. She is not toxic-appearing.   HENT:      Mouth/Throat:      Mouth: Mucous membranes are dry.      Comments: Upper dentures are in  She has 5 lower teeth that are at various stages of decay  Very large submandibular abscess draining pus with surrounding erythema and swelling with necrotic tissue  Cardiovascular:      Rate and Rhythm: Normal rate and regular rhythm.   Abdominal:      General: There is no distension.      Tenderness: There is no abdominal tenderness.   Musculoskeletal:      Right lower leg: No edema.      Left lower leg: No edema.   Neurological:      General: No focal deficit present.      Mental Status: She is alert and oriented to person, place, and time.   Psychiatric:         Mood and Affect: Mood normal.         Behavior: Behavior normal.         Laboratory:  Recent Labs     03/06/24  1618   WBC 25.9*   RBC 4.02*   HEMOGLOBIN 11.4*   HEMATOCRIT 33.1*   MCV 82.3   MCH 28.4   MCHC 34.4   RDW 56.4*   PLATELETCT 651*   MPV 8.3*     Recent Labs     03/06/24  1618   SODIUM 139   POTASSIUM 3.4*   CHLORIDE 106   CO2 15*   GLUCOSE 134*   BUN 13   CREATININE 0.71   CALCIUM 9.2     Recent Labs     03/06/24  1618   ALTSGPT 15   ASTSGOT 9*   ALKPHOSPHAT 141*   TBILIRUBIN 0.3   GLUCOSE 134*         No results for input(s): \"NTPROBNP\" in the last 72 hours.      No results for input(s): \"TROPONINT\" in the last 72 hours.    Imaging:  CT-SOFT TISSUE NECK WITH   Final Result      1.  Large " subcutaneous phlegmon involving the subcutaneous tissues beneath the mandible anteriorly.      DX-CHEST-PORTABLE (1 VIEW)   Final Result      No acute cardiac or pulmonary abnormalities are identified.      [id number]   Final Result      1.  No evidence of mandibular fracture or osseous lesion.              Assessment/Plan:  Justification for Admission Status  I anticipate this patient will require at least two midnights for appropriate medical management, necessitating inpatient admission because IV antibiotics until the 48-72 hour cultures come back as well as surgery to drain a large submandibular abscess.    Patient will need a Med/Surg bed on MEDICAL service .  The need is secondary to as above.    * Abscess of chin- (present on admission)  Assessment & Plan  Very large abscess of the chin that is purulent with surrounding cellulitis likely due to underlying dental decay  White blood cell count is 25,000  Dr. Sanchez oral surgery has been consulted and recommends n.p.o. midnight for surgery in the morning  IV Unasyn and vancomycin  The abscess will be cultured in the emergency room given the copious purulent discharge    Type 2 diabetes mellitus without complication, without long-term current use of insulin ([facility])- (present on admission)  Assessment & Plan  With hyperglycemia glucose of 134  She is on metformin as an outpatient  Recent hemoglobin A1c 4 months ago was 6.5  Hold off on insulin for now as she will be n.p.o. at midnight      Metabolic acidosis- (present on admission)  Assessment & Plan  Bicarb is low at 15 and anion gap is high at 18  Secondary to sepsis and dehydration  IV fluids have been ordered with potassium  Basic metabolic panel ordered for the morning    Hypokalemia- (present on admission)  Assessment & Plan  Potassium is low at 3.4 and will be replaced intravenously    Dental decay- (present on admission)  Assessment & Plan  She has 5 remaining decaying teeth on the bottom that  she is hoping to have removed.    Tobacco abuse- (present on admission)  Assessment & Plan  Cessation discussed  Nicotine patch offered         VTE prophylaxis: SCDs/TEDs

## 2024-03-07 NOTE — ED NOTES
Bedside report received from off going RN/tech: Michel, assumed care of patient.  POC discussed with patient. Call light within reach, all needs addressed at this time.       Fall risk interventions in place: Not Applicable (all applicable per [place] Fall risk assessment)   Continuous monitoring: Cardiac Leads, Pulse Ox, or Blood Pressure  IVF/IV medications: Infusion per MAR (List Med(s)) Vancomycin running  Oxygen: Room Air  Bedside sitter: Not Applicable   Isolation: Not Applicable

## 2024-03-07 NOTE — PROGRESS NOTES
Pharmacy Vancomycin Kinetics Note for 3/6/2024     68 y.o. female on Vancomycin day # 1     Vancomycin Indication (AUC Dosing): Skin/skin structure infection    Provider specified end date: 03/11/24    Active Antibiotics (From admission, onward)      Ordered     Ordering Provider       Wed Mar 6, 2024  8:00 PM    03/06/24 2000  vancomycin (Vancocin) 1,000 mg in  mL IVPB  (vancomycin (VANCOCIN) IV (LD + Maintenance))  EVERY 12 HOURS        Note to Pharmacy: Per P&T vanco per pharmacy Protocol    Sadi Fuchs M.D.       Wed Mar 6, 2024  7:15 PM    03/06/24 1915  ampicillin/sulbactam (Unasyn) 3 g in  mL IVPB  EVERY 6 HOURS         Sadi Fuchs M.D.    03/06/24 1915  MD Alert...Vancomycin per Pharmacy  PHARMACY TO DOSE        Question:  Indication(s) for vancomycin?  Answer:  Skin and soft tissue infection    Sadi Fuchs M.D.       Wed Mar 6, 2024  5:11 PM    03/06/24 1711  vancomycin (Vancocin) 2,250 mg in  mL IVPB  (vancomycin (VANCOCIN) IV (LD + Maintenance))  ONCE         Adithya Banuelos M.D.            Dosing Weight: 90.7 kg (199 lb 15.3 oz)      Admission History: Admitted on 3/6/2024 for Neck abscess [L02.11]  Pertinent history: Patient presenting for tooth extraction of 5 teeth due to dental decay with chin abscess. Very large chin abscess with purulent drainage. WBC elevated at 25. Empiric unasyn and vancco therapy initiated.    Allergies:     Patient has no known allergies.     Pertinent cultures to date:     Results       Procedure Component Value Units Date/Time    CULTURE WOUND W/ GRAM STAIN [[id number]] Collected: 03/06/24 1851    Order Status: Sent Specimen: Wound from Neck Updated: 03/06/24 1911    MRSA By PCR (Amp) [[id number]]     Order Status: Sent Specimen: Respirate from Nares     Blood Culture - Draw one from central line and one from peripheral site [[id number]] Collected: 03/06/24 1618    Order Status: Sent Specimen: Blood from Peripheral Updated: 03/06/24 1656     "Narrative:      Blood Cultures X2. Draw one blood culture from central line  and one blood culture peripherally. If no line present, draw  blood cultures times two peripherally from different sites.    Urinalysis [[id number]]     Order Status: Sent Specimen: Urine     Urine Culture (New) [[id number]]     Order Status: Sent Specimen: Urine     Blood Culture - Draw one from central line and one from peripheral site [[id number]]     Order Status: Sent Specimen: Blood from Line             Labs:     Estimated Creatinine Clearance: 85 mL/min (by C-G formula based on SCr of 0.71 mg/dL).  Recent Labs     24  1618   WBC 25.9*   NEUTSPOLYS 87.30*     Recent Labs     24  1618   BUN 13   CREATININE 0.71   ALBUMIN 3.4     No intake or output data in the 24 hours ending 24   /78   Pulse 100   Temp 36.1 °C (97 °F) (Temporal)   Resp (!) 22   Ht 1.626 m (5' 4\")   Wt 90.7 kg (200 lb)   SpO2 98%  Temp (24hrs), Av.1 °C (97 °F), Min:36.1 °C (97 °F), Max:36.1 °C (97 °F)      List concerns for Vancomycin clearance:     Age;Obesity;Receipt of contrast dye    Pharmacokinetics:     AUC kinetics:   Ke (hr ^-1): 0.075 hr^-1  Half life: 9.24 hr  Clearance: 4.257  Estimated TDD: 2128.5  Estimated Dose: 993  Estimated interval: 11.2    A/P:     -  Vancomycin dose: 2250mg loading dose followed by 1000mg Q12h    -  Next vancomycin level(s):    - None ordered at this time. Likely obtain levels after the 4th maintenance dose unless clinical status or renal function changes.     -  Predicted vancomycin AUC from initial AUC test calculator: 470 mg·hr/L    -  Comments: Concerns for accumulation listed above. MRSA nares swab ordered. Pharmacy will continue to follow and adjust therapy as clinically appropriate.    Sid Brownlee, PharmD    "

## 2024-03-07 NOTE — CARE PLAN
The patient is Stable - Low risk of patient condition declining or worsening    Shift Goals  Clinical Goals: pain management, CHG bath, IV antibiotics, prepare for surgery  Patient Goals: pain management, have surgery  Family Goals: updates on POC    Progress made toward(s) clinical / shift goals:  Pain has been medicated per MAR. Patient continues to tolerate IV antibiotic regimen. Continuous IVF remains in place. CHG bath has been completed. Patient has remained NPO in anticipation of upcoming surgery.     Patient is not progressing towards the following goals: Pending discharge clearance.    Problem: Pain - Standard  Goal: Alleviation of pain or a reduction in pain to the patient’s comfort goal  Outcome: Progressing     Problem: Knowledge Deficit - Standard  Goal: Patient and family/care givers will demonstrate understanding of plan of care, disease process/condition, diagnostic tests and medications  Outcome: Progressing     Problem: Physical Regulation  Goal: Diagnostic test results will improve  Outcome: Progressing  Goal: Signs and symptoms of infection will decrease  Outcome: Progressing

## 2024-03-07 NOTE — ED NOTES
Med Rec complete per patient   Allergies reviewed  Antibiotics in the past 30 days:no  Anticoagulant in past 14 days:no  Pharmacy patient utilizes:[address]

## 2024-03-07 NOTE — PROGRESS NOTES
4 Eyes Skin Assessment Completed by AGUILA Marin and MALIK RN.    Head WDL  Ears WDL  Nose WDL  Mouth WDL  Neck/Chin Swelling, Redness, Abscess with drainage  Breast/Chest Redness under breasts and Scab  Shoulder Blades WDL  Spine WDL  (R) Arm/Elbow/Hand WDL  (L) Arm/Elbow/Hand WDL  Abdomen Redness in pannus area  Groin Redness and Blanching  Scrotum/Coccyx/Buttocks WDL  (R) Leg WDL  (L) Leg WDL  (R) Heel/Foot/Toe Redness and Blanching  (L) Heel/Foot/Toe Redness and Blanching          Devices In Places Blood Pressure Cuff, Pulse Ox, and Nasal Cannula      Interventions In Place NC W/Ear Foams, Pillows, and Low Air Loss Mattress    Possible Skin Injury No    Pictures Uploaded Into Epic N/A  Wound Consult Placed N/A  RN Wound Prevention Protocol Ordered No

## 2024-03-07 NOTE — CONSULTS
Dental Infection Consult  [facility] Oral & Maxillofacial Surgery    ID: Alesia Cesar is a 68 y.o. female who presented to the ER with pain and swelling under her jaw. She states she has 5 remaining teeth that need to be removed and was scheduled for tooth extraction but started developing swelling and pain under her jaw 2 days ago. She was sent from Dr. Pisano's office to the ER today.     PMH:   Past Medical History:   Diagnosis Date    ADD (attention deficit disorder)     Anxiety     Depression     Dyslipidemia     Fibromyalgia     Hypothyroidism      Medications:   Current Facility-Administered Medications   Medication Dose Route Frequency Provider Last Rate Last Admin    lactated ringers (LR) bolus  1,000 mL Intravenous Once Adithya Banuelos M.D.        vancomycin (Vancocin) 2,250 mg in  mL IVPB  25 mg/kg Intravenous Once Adithya Banuelos M.D.         Current Outpatient Medications   Medication Sig Dispense Refill    acyclovir (ZOVIRAX) 5 % Ointment Apply 1 Application topically every four hours as needed (outbreak). To groin      amphetamine-dextroamphetamine (ADDERALL) 15 MG tablet Take 15 mg by mouth 3 times a day as needed.      busPIRone (BUSPAR) 30 MG tablet Take 30 mg by mouth 2 times a day.      LORazepam (ATIVAN) 0.5 MG Tab Take 0.5 mg by mouth 2 times a day.      ARIPiprazole (ABILIFY) 10 MG Tab Take 1 tablet by mouth every day. 90 tablet 0    fluticasone (FLONASE) 50 MCG/ACT nasal spray Administer 1-2 Sprays into affected nostril(S) every day.      oxybutynin (DITROPAN XL) 15 MG CR tablet Take 15 mg by mouth every day.      venlafaxine (EFFEXOR-XR) 150 MG extended-release capsule Take 1 capsule by mouth every day. 90 capsule 0    metFORMIN (GLUCOPHAGE) 850 MG Tab Take 850 mg by mouth 2 Times a Day.      atorvastatin (LIPITOR) 20 MG Tab Take 20 mg by mouth every day.      montelukast (SINGULAIR) 10 MG Tab Take 10 mg by mouth every day.      valacyclovir (VALTREX) 1 GM Tab Take 1,000  mg by mouth 1 time a day as needed (outbreak).       Allergies: No Known Allergies  Surgical history:   Past Surgical History:   Procedure Laterality Date    CHOLECYSTECTOMY       Social history:   Social History     Social History Narrative    Not on file     Family history:   Family History   Problem Relation Age of Onset    Other Sister         parkinsons disease       ROS: All systems reviewed and are negative other than those noted in HPI and PMH.    Vitals:  Vitals:    03/06/24 1523   BP: (!) 141/81   Pulse: (!) 119   Resp: 16   Temp: 36.1 °C (97 °F)   SpO2: 99%       Labs:  Recent Labs     03/06/24  1618   WBC 25.9*   RBC 4.02*   HEMOGLOBIN 11.4*   HEMATOCRIT 33.1*   MCV 82.3   MCH 28.4   RDW 56.4*   PLATELETCT 651*   MPV 8.3*   NEUTSPOLYS 87.30*   LYMPHOCYTES 6.40*   MONOCYTES 4.70   EOSINOPHILS 0.20   BASOPHILS 0.20     Recent Labs     03/06/24  1618   SODIUM 139   POTASSIUM 3.4*   CHLORIDE 106   CO2 15*   GLUCOSE 134*   BUN 13           Exam:    Imaging: Independent review of CT Soft tissue Neck w/contrast and panorex with the following findings: Fractured/carious remaining mandibular teeth x 5 with large periapical lucencies, large submental/submandibular space phlegmon.     Assessment: 68 y.o. female with fractured/non-restorable remaining mandibular dentition, large submental/submandibular space phlegmon.     Plan:  Admit to medicine for IV abx and pain control  Plan for OR tomorrow evening for extraction of remaining mandibular teeth and I&D of submental/submandibular spaces  Please make NPO 8 hours prior to surgery    Derek Sanchez, DMD  [phone]  [facility] Oral [facility] Maxillofacial Surgery

## 2024-03-08 ENCOUNTER — PHARMACY VISIT (OUTPATIENT)
Dept: PHARMACY | Facility: MEDICAL CENTER | Age: 69
End: 2024-03-08
Payer: COMMERCIAL

## 2024-03-08 VITALS
DIASTOLIC BLOOD PRESSURE: 71 MMHG | BODY MASS INDEX: 34.15 KG/M2 | OXYGEN SATURATION: 95 % | SYSTOLIC BLOOD PRESSURE: 113 MMHG | HEIGHT: 64 IN | RESPIRATION RATE: 18 BRPM | WEIGHT: 200 LBS | TEMPERATURE: 97.9 F | HEART RATE: 93 BPM

## 2024-03-08 LAB
ALBUMIN SERPL BCP-MCNC: 3 G/DL (ref 3.2–4.9)
BACTERIA WND AEROBE CULT: ABNORMAL
BACTERIA WND AEROBE CULT: ABNORMAL
BUN SERPL-MCNC: 9 MG/DL (ref 8–22)
CALCIUM ALBUM COR SERPL-MCNC: 9.6 MG/DL (ref 8.5–10.5)
CALCIUM SERPL-MCNC: 8.8 MG/DL (ref 8.5–10.5)
CHLORIDE SERPL-SCNC: 110 MMOL/L (ref 96–112)
CO2 SERPL-SCNC: 20 MMOL/L (ref 20–33)
CREAT SERPL-MCNC: 0.52 MG/DL (ref 0.5–1.4)
ERYTHROCYTE [DISTWIDTH] IN BLOOD BY AUTOMATED COUNT: 61.2 FL (ref 35.9–50)
GFR SERPLBLD CREATININE-BSD FMLA CKD-EPI: 102 ML/MIN/1.73 M 2
GLUCOSE SERPL-MCNC: 173 MG/DL (ref 65–99)
GRAM STN SPEC: ABNORMAL
GRAM STN SPEC: NORMAL
HCT VFR BLD AUTO: 31.7 % (ref 37–47)
HGB BLD-MCNC: 10.1 G/DL (ref 12–16)
MCH RBC QN AUTO: 28 PG (ref 27–33)
MCHC RBC AUTO-ENTMCNC: 31.9 G/DL (ref 32.2–35.5)
MCV RBC AUTO: 87.8 FL (ref 81.4–97.8)
PHOSPHATE SERPL-MCNC: 4 MG/DL (ref 2.5–4.5)
PLATELET # BLD AUTO: 536 K/UL (ref 164–446)
PMV BLD AUTO: 8 FL (ref 9–12.9)
POTASSIUM SERPL-SCNC: 4.6 MMOL/L (ref 3.6–5.5)
RBC # BLD AUTO: 3.61 M/UL (ref 4.2–5.4)
SIGNIFICANT IND 70042: ABNORMAL
SIGNIFICANT IND 70042: NORMAL
SITE SITE: ABNORMAL
SITE SITE: NORMAL
SODIUM SERPL-SCNC: 142 MMOL/L (ref 135–145)
SOURCE SOURCE: ABNORMAL
SOURCE SOURCE: NORMAL
WBC # BLD AUTO: 16.8 K/UL (ref 4.8–10.8)

## 2024-03-08 PROCEDURE — 700111 HCHG RX REV CODE 636 W/ 250 OVERRIDE (IP): Mod: JZ | Performed by: HOSPITALIST

## 2024-03-08 PROCEDURE — 99239 HOSP IP/OBS DSCHRG MGMT >30: CPT | Performed by: INTERNAL MEDICINE

## 2024-03-08 PROCEDURE — 85027 COMPLETE CBC AUTOMATED: CPT

## 2024-03-08 PROCEDURE — 700105 HCHG RX REV CODE 258: Performed by: HOSPITALIST

## 2024-03-08 PROCEDURE — A9270 NON-COVERED ITEM OR SERVICE: HCPCS | Performed by: HOSPITALIST

## 2024-03-08 PROCEDURE — 700102 HCHG RX REV CODE 250 W/ 637 OVERRIDE(OP): Performed by: HOSPITALIST

## 2024-03-08 PROCEDURE — RXMED WILLOW AMBULATORY MEDICATION CHARGE: Performed by: INTERNAL MEDICINE

## 2024-03-08 PROCEDURE — 80069 RENAL FUNCTION PANEL: CPT

## 2024-03-08 RX ORDER — ACETAMINOPHEN 325 MG/1
650 TABLET ORAL EVERY 6 HOURS PRN
Qty: 30 TABLET | Refills: 0 | Status: SHIPPED | OUTPATIENT
Start: 2024-03-08

## 2024-03-08 RX ORDER — HYDROCODONE BITARTRATE AND ACETAMINOPHEN 5; 325 MG/1; MG/1
1 TABLET ORAL EVERY 6 HOURS PRN
Qty: 12 TABLET | Refills: 0 | Status: SHIPPED | OUTPATIENT
Start: 2024-03-08 | End: 2024-03-08

## 2024-03-08 RX ORDER — HYDROCODONE BITARTRATE AND ACETAMINOPHEN 5; 325 MG/1; MG/1
1 TABLET ORAL EVERY 6 HOURS PRN
Qty: 12 TABLET | Refills: 0 | Status: SHIPPED | OUTPATIENT
Start: 2024-03-08 | End: 2024-03-11

## 2024-03-08 RX ORDER — AMOXICILLIN AND CLAVULANATE POTASSIUM 875; 125 MG/1; MG/1
1 TABLET, FILM COATED ORAL 2 TIMES DAILY
Qty: 22 TABLET | Refills: 0 | Status: ACTIVE | OUTPATIENT
Start: 2024-03-08 | End: 2024-03-19

## 2024-03-08 RX ADMIN — BUSPIRONE HYDROCHLORIDE 30 MG: 10 TABLET ORAL at 06:38

## 2024-03-08 RX ADMIN — ATORVASTATIN CALCIUM 20 MG: 20 TABLET, FILM COATED ORAL at 06:39

## 2024-03-08 RX ADMIN — ARIPIPRAZOLE 10 MG: 10 TABLET ORAL at 06:38

## 2024-03-08 RX ADMIN — OXYCODONE 10 MG: 5 TABLET ORAL at 15:07

## 2024-03-08 RX ADMIN — OXYCODONE 10 MG: 5 TABLET ORAL at 00:52

## 2024-03-08 RX ADMIN — OXYCODONE 10 MG: 5 TABLET ORAL at 06:39

## 2024-03-08 RX ADMIN — POTASSIUM CHLORIDE: 2 INJECTION, SOLUTION, CONCENTRATE INTRAVENOUS at 02:49

## 2024-03-08 RX ADMIN — AMPICILLIN AND SULBACTAM 3 G: 1; 2 INJECTION, POWDER, FOR SOLUTION INTRAMUSCULAR; INTRAVENOUS at 05:12

## 2024-03-08 RX ADMIN — MORPHINE SULFATE 4 MG: 4 INJECTION, SOLUTION INTRAMUSCULAR; INTRAVENOUS at 10:22

## 2024-03-08 RX ADMIN — AMPICILLIN AND SULBACTAM 3 G: 1; 2 INJECTION, POWDER, FOR SOLUTION INTRAMUSCULAR; INTRAVENOUS at 00:59

## 2024-03-08 RX ADMIN — VENLAFAXINE HYDROCHLORIDE 150 MG: 75 CAPSULE, EXTENDED RELEASE ORAL at 06:38

## 2024-03-08 RX ADMIN — NICOTINE TRANSDERMAL SYSTEM 21 MG: 21 PATCH, EXTENDED RELEASE TRANSDERMAL at 06:38

## 2024-03-08 RX ADMIN — LORAZEPAM 0.5 MG: 1 TABLET ORAL at 06:39

## 2024-03-08 RX ADMIN — AMPICILLIN AND SULBACTAM 3 G: 1; 2 INJECTION, POWDER, FOR SOLUTION INTRAMUSCULAR; INTRAVENOUS at 11:45

## 2024-03-08 ASSESSMENT — PAIN DESCRIPTION - PAIN TYPE
TYPE: ACUTE PAIN
TYPE: ACUTE PAIN
TYPE: SURGICAL PAIN
TYPE: ACUTE PAIN;SURGICAL PAIN
TYPE: SURGICAL PAIN
TYPE: ACUTE PAIN;SURGICAL PAIN
TYPE: ACUTE PAIN;SURGICAL PAIN

## 2024-03-08 ASSESSMENT — COGNITIVE AND FUNCTIONAL STATUS - GENERAL
MOBILITY SCORE: 24
SUGGESTED CMS G CODE MODIFIER DAILY ACTIVITY: CH
DAILY ACTIVITIY SCORE: 24
SUGGESTED CMS G CODE MODIFIER MOBILITY: CH

## 2024-03-08 ASSESSMENT — PATIENT HEALTH QUESTIONNAIRE - PHQ9
2. FEELING DOWN, DEPRESSED, IRRITABLE, OR HOPELESS: NOT AT ALL
1. LITTLE INTEREST OR PLEASURE IN DOING THINGS: NOT AT ALL
SUM OF ALL RESPONSES TO PHQ9 QUESTIONS 1 AND 2: 0

## 2024-03-08 NOTE — DISCHARGE SUMMARY
Discharge Summary    CHIEF COMPLAINT ON ADMISSION  Chief Complaint   Patient presents with    Sent by MD     Pt was sent by oral surgeon for follow up of need for tooth extraction. Pt states she has 5 teeth that need removed. 2 days ago pts lower jaw began to swell and now pt present with a large draining wound.   Denies trouble swallowing or breathing.        Reason for Admission  Sent by MD     Admission Date  3/6/2024    CODE STATUS  Full Code    HPI & HOSPITAL COURSE  This is a 68 y.o. female here with Sent by MD (Pt was sent by oral surgeon for follow up of need for tooth extraction. Pt states she has 5 teeth that need removed. 2 days ago pts lower jaw began to swell and now pt present with a large draining wound. /Denies trouble swallowing or breathing. )  Please review Dr. Sadi Fuchs M.D. notes for further details of history of present illness, past medical/social/family histories, allergies and medications. Please review Dr. Sanchez, Oral Surgery consultation notes.  68 year old obese female who smokes cigarettes with history of insulin depdendent diabetes and depression. Recently had 5 teeth extracted now sent 3/6/2024 by Dr. Pisano, Oral Surgery for infection.   Hospitalized for large and purulent submandibular abscess and wound  S/p InD and 2 penrose drains and teeth extractions by Dr. Sanchez on 3/7.  COntinue antibiotics. WBC 25-17K-16K. S. Inetermedius on wound culture. Discussed with ID pharmacy. Agreed to an Augmentin course. I arranged Wound Clinic. Patient wanted to go home.     At discharge date, Alesia Cesar afebrile and hemodynamically stable.  Alesia Cesar wanted to be discharged today.    Imaging  CT-SOFT TISSUE NECK WITH   Final Result      1.  Large subcutaneous phlegmon involving the subcutaneous tissues beneath the mandible anteriorly.      DX-CHEST-PORTABLE (1 VIEW)   Final Result      No acute cardiac or pulmonary abnormalities are identified.      [id number]    Final Result      1.  No evidence of mandibular fracture or osseous lesion.                Therefore, she is discharged in fair and stable condition to home with close outpatient follow-up.    The patient met 2-midnight criteria for an inpatient stay at the time of discharge.    Discharge Date  3/8/2024    FOLLOW UP ITEMS POST DISCHARGE      DISCHARGE DIAGNOSES  Principal Problem:    Abscess of chin (POA: Yes)  Active Problems:    Type 2 diabetes mellitus without complication, without long-term current use of insulin (HCC) (POA: Yes)    Dental decay (POA: Yes)    Metabolic acidosis (POA: Yes)    Hypokalemia (POA: Yes)    Tobacco abuse (POA: Yes)  Neck abscess  FOLLOW UP  No future appointments.  No follow-up provider specified.  FOllow up with Immanuel Chandler M.D. in 1 week  FOllow up with Dr. Sanchez, oral surgeon postop visit in 1 week. He recommends keeping the Penrose drains for 7 days. Antibiotics for 2 weeks.  NURSING provide resources/pamphlets for  Postop instructions from the surgeon, Antibiotic use, narcotic use Avoid swimming, driving or operating machinery. Treat constipation with prescribed bowel regimen, diabetes, hypertension (Check and record blood pressures at home at least twice a day for primary provider to review), obesity (Recommended weight loss advised, 5% through reduced calorie, low carb diet and 150 mins of exercise a week once better  Recommend bariatric surgery evaluation if morbidly obese  Educated on the increase of morbidity and mortality associated with excess weight including DM, Heart Disease, HTN, stroke, and sleep apnea. Recommended outpatient monitoring  of blood sugars, lipid panel, sleep study evaluation for metabolic syndrome.)  MEDICATIONS ON DISCHARGE     Medication List        START taking these medications        Instructions   acetaminophen 325 MG Tabs  Commonly known as: Tylenol   Take 2 Tablets by mouth every 6 hours as needed for Fever, Moderate Pain or Mild  Pain.  Dose: 650 mg     amoxicillin-clavulanate 875-125 MG Tabs  Commonly known as: Augmentin   Take 1 Tablet by mouth 2 times a day for 11 days.  Dose: 1 Tablet     HYDROcodone-acetaminophen 5-325 MG Tabs per tablet  Commonly known as: Norco   Take 1 Tablet by mouth every 6 hours as needed (pain) for up to 3 days.  Dose: 1 Tablet            CONTINUE taking these medications        Instructions   acyclovir 5 % Oint  Commonly known as: Zovirax   Apply 1 Application topically every four hours as needed (outbreak). To groin  Dose: 1 Application     amphetamine-dextroamphetamine 15 MG tablet  Commonly known as: Adderall   Take 15 mg by mouth 3 times a day as needed.  Dose: 15 mg     ARIPiprazole 10 MG Tabs  Commonly known as: Abilify   Doctor's comments: Patient lost her medication bottle, okay for an early refill.  Take 1 tablet by mouth every day.  Dose: 10 mg     atorvastatin 20 MG Tabs  Commonly known as: Lipitor   Take 20 mg by mouth every day.  Dose: 20 mg     busPIRone 30 MG tablet  Commonly known as: Buspar   Take 30 mg by mouth 2 times a day.  Dose: 30 mg     fluticasone 50 MCG/ACT nasal spray  Commonly known as: Flonase   Administer 1-2 Sprays into affected nostril(S) every day.  Dose: 1-2 Spray     LORazepam 0.5 MG Tabs  Commonly known as: Ativan   Take 0.5 mg by mouth 2 times a day.  Dose: 0.5 mg     metFORMIN 850 MG Tabs  Commonly known as: Glucophage   Take 850 mg by mouth 2 Times a Day.  Dose: 850 mg     montelukast 10 MG Tabs  Commonly known as: Singulair   Take 10 mg by mouth every day.  Dose: 10 mg     oxybutynin 15 MG CR tablet  Commonly known as: Ditropan-XL   Take 15 mg by mouth every day.  Dose: 15 mg     valacyclovir 1 GM Tabs  Commonly known as: Valtrex   Take 1,000 mg by mouth 1 time a day as needed (outbreak).  Dose: 1,000 mg     venlafaxine 150 MG extended-release capsule  Commonly known as: Effexor-XR   Take 1 capsule by mouth every day.  Dose: 150 mg              Allergies  No Known  Allergies    DIET  Orders Placed This Encounter   Procedures    Diet Order Diet: Low Fiber(GI Soft)     Standing Status:   Standing     Number of Occurrences:   1     Order Specific Question:   Diet:     Answer:   Low Fiber(GI Soft) [2]       ACTIVITY  Avoid heavy lifting or strenuous activity      CONSULTATIONS  Oral Surgery    PROCEDURES      LABORATORY  Lab Results   Component Value Date    SODIUM 142 03/08/2024    POTASSIUM 4.6 03/08/2024    CHLORIDE 110 03/08/2024    CO2 20 03/08/2024    GLUCOSE 173 (H) 03/08/2024    BUN 9 03/08/2024    CREATININE 0.52 03/08/2024        Lab Results   Component Value Date    WBC 16.8 (H) 03/08/2024    HEMOGLOBIN 10.1 (L) 03/08/2024    HEMATOCRIT 31.7 (L) 03/08/2024    PLATELETCT 536 (H) 03/08/2024        Total time of the discharge process exceeds 40 minutes.

## 2024-03-08 NOTE — PROGRESS NOTES
Hospital Medicine Daily Progress Note    Date of Service  3/7/2024    Chief Complaint  Alesia Cesar is a 68 y.o. female admitted 3/6/2024 with Sent by MD (Pt was sent by oral surgeon for follow up of need for tooth extraction. Pt states she has 5 teeth that need removed. 2 days ago pts lower jaw began to swell and now pt present with a large draining wound. /Denies trouble swallowing or breathing. )    Hospital Course  No notes on file    Interval Problem Update  I reviewed the chart along with vitals, labs, imaging, test (both pending and resulted) and recommendations from specialists and interdisciplinary team.  68 year old obese female who smokes cigarettes with history of insulin depdendent diabetes and depression. Recently had 5 teeth extracted now sent 3/6/2024 by Dr. Pisano, Oral Surgery for infection.   Hospitalized for large and purulent submandibular abscess and wound  Dr. Sanchez plans surgery TODAY  COntinue antibiotics. WBC 25-17K. Wound culture streptococcus intermedius sensitivities PENDING.   I have discussed this patient's plan of care and discharge plan at IDT rounds today with Case Management, Nursing, Nursing leadership, and other members of the IDT team.    Consultants/Specialty  Oral Surgery    Code Status  Full Code    Disposition  The patient is not medically cleared for discharge to home or a post-acute facility.      I have placed the appropriate orders for post-discharge needs.    Review of Systems  Review of Systems   Constitutional:  Negative for chills and fever.   HENT:  Negative for congestion, hearing loss and nosebleeds.    Eyes:  Negative for pain and redness.   Respiratory:  Negative for cough, hemoptysis, shortness of breath and wheezing.    Cardiovascular:  Negative for chest pain and palpitations.   Gastrointestinal:  Negative for abdominal pain, blood in stool, constipation, diarrhea, nausea and vomiting.   Genitourinary:  Negative for dysuria, frequency and hematuria.    Musculoskeletal:  Negative for falls, joint pain and myalgias.   Skin:  Negative for rash.   Neurological:  Negative for dizziness, tremors, focal weakness, seizures, loss of consciousness, weakness and headaches.   Psychiatric/Behavioral:  The patient is not nervous/anxious and does not have insomnia.    All other systems reviewed and are negative.       Physical Exam  Temp:  [36.3 °C (97.3 °F)-36.7 °C (98.1 °F)] 36.3 °C (97.3 °F)  Pulse:  [] 90  Resp:  [18-22] 18  BP: (101-119)/(66-79) 106/73  SpO2:  [90 %-98 %] 95 %    Physical Exam  Vitals and nursing note reviewed.   Constitutional:       General: She is not in acute distress.  HENT:      Head: Normocephalic and atraumatic.      Comments: Bandages over Jaw CDI  Lower mandible tenderness     Right Ear: External ear normal.      Left Ear: External ear normal.      Nose: Nose normal.      Mouth/Throat:      Mouth: Mucous membranes are moist.   Eyes:      General: No scleral icterus.     Conjunctiva/sclera: Conjunctivae normal.   Cardiovascular:      Rate and Rhythm: Normal rate and regular rhythm.      Pulses: Normal pulses.      Heart sounds: No murmur heard.     No friction rub. No gallop.   Pulmonary:      Effort: Pulmonary effort is normal. No respiratory distress.      Breath sounds: Normal breath sounds. No stridor. No wheezing, rhonchi or rales.   Chest:      Chest wall: No tenderness.   Abdominal:      General: Abdomen is flat. Bowel sounds are normal. There is no distension.      Palpations: Abdomen is soft.      Tenderness: There is no abdominal tenderness. There is no guarding or rebound.   Musculoskeletal:         General: No swelling, tenderness or deformity. Normal range of motion.      Cervical back: Normal range of motion and neck supple. No rigidity.   Skin:     General: Skin is warm.      Coloration: Skin is not jaundiced.   Neurological:      General: No focal deficit present.      Mental Status: She is alert and oriented to person,  place, and time. Mental status is at baseline.   Psychiatric:         Mood and Affect: Mood normal.         Behavior: Behavior normal.         Thought Content: Thought content normal.         Judgment: Judgment normal.         Fluids    Intake/Output Summary (Last 24 hours) at 3/7/2024 1803  Last data filed at 3/7/2024 0024  Gross per 24 hour   Intake 450 ml   Output 450 ml   Net 0 ml       Laboratory  Recent Labs     03/06/24  1618 03/07/24  0155   WBC 25.9* 17.4*   RBC 4.02* 3.64*   HEMOGLOBIN 11.4* 10.1*   HEMATOCRIT 33.1* 30.5*   MCV 82.3 83.8   MCH 28.4 27.7   MCHC 34.4 33.1   RDW 56.4* 58.5*   PLATELETCT 651* 552*   MPV 8.3* 8.2*     Recent Labs     03/06/24  1618 03/07/24  0155   SODIUM 139 137   POTASSIUM 3.4* 3.5*   CHLORIDE 106 107   CO2 15* 17*   GLUCOSE 134* 100*   BUN 13 9   CREATININE 0.71 0.54   CALCIUM 9.2 8.5                   Imaging  CT-SOFT TISSUE NECK WITH   Final Result      1.  Large subcutaneous phlegmon involving the subcutaneous tissues beneath the mandible anteriorly.      DX-CHEST-PORTABLE (1 VIEW)   Final Result      No acute cardiac or pulmonary abnormalities are identified.      [id number]   Final Result      1.  No evidence of mandibular fracture or osseous lesion.           Assessment/Plan  * Abscess of chin- (present on admission)  Assessment & Plan  Very large abscess of the chin that is purulent with surrounding cellulitis likely due to underlying dental decay  White blood cell count is 25,000  Dr. Sanchez oral surgery has been consulted and recommends n.p.o. midnight for surgery in the morning  IV Unasyn and vancomycin  The abscess will be cultured in the emergency room given the copious purulent discharge    Dr. Sanchez plans surgery TODAY  COntinue antibiotics. WBC 25-17K. Wound culture streptococcus intermedius sensitivities PENDING.     Tobacco abuse- (present on admission)  Assessment & Plan  Cessation discussed  Nicotine patch offered     I spent 4 minutes,  discussing tobacco dependence and cardiac as well as pulmonary risk. Smoking cessation instructions. Code 06618  I also mentioned smoking retards healing.      Hypokalemia- (present on admission)  Assessment & Plan  Potassium is low at 3.4 and will be replaced intravenously    Metabolic acidosis- (present on admission)  Assessment & Plan  Bicarb is low at 15 and anion gap is high at 18  Secondary to sepsis and dehydration  IV fluids have been ordered with potassium  Basic metabolic panel ordered for the morning    Dental decay- (present on admission)  Assessment & Plan  She has 5 remaining decaying teeth on the bottom that she is hoping to have removed.    Type 2 diabetes mellitus without complication, without long-term current use of insulin ([facility])- (present on admission)  Assessment & Plan  With hyperglycemia glucose of 134  She is on metformin as an outpatient  Recent hemoglobin A1c 4 months ago was 6.5  Hold off on insulin for now as she will be n.p.o. at midnight    Stable BGs           VTE prophylaxis: SCDs    I have performed a physical exam and reviewed and updated ROS and Plan today (3/7/2024). In review of yesterday's note (3/6/2024), there are no changes except as documented above.

## 2024-03-08 NOTE — ANESTHESIA TIME REPORT
Anesthesia Start and Stop Event Times       Date Time Event    3/7/2024 1758 Anesthesia Start     1814 Ready for Procedure     1900 Anesthesia Stop          Responsible Staff  03/07/24      Name Role Begin End    Stef Le M.D. Anesth 1758 1900          Overtime Reason:  no overtime (within assigned shift)    Comments:

## 2024-03-08 NOTE — CARE PLAN
"The patient is Stable - Low risk of patient condition declining or worsening    Shift Goals  Clinical Goals: pain management, wean oxygen, potential discharge home  Patient Goals: \"I hope I'm going home\"  Family Goals: not currently present    Progress made toward(s) clinical / shift goals:  Patient has been successfully weaned off of supplemental oxygen. Patient's pain has been medicated per MAR. Discharge orders are in place.     Patient is not progressing towards the following goals: N/A    Problem: Pain - Standard  Goal: Alleviation of pain or a reduction in pain to the patient’s comfort goal  Outcome: Met     Problem: Knowledge Deficit - Standard  Goal: Patient and family/care givers will demonstrate understanding of plan of care, disease process/condition, diagnostic tests and medications  Outcome: Met     Problem: Hemodynamics  Goal: Patient's hemodynamics, fluid balance and neurologic status will be stable or improve  Outcome: Met     Problem: Fluid Volume  Goal: Fluid volume balance will be maintained  Outcome: Met     Problem: Urinary - Renal Perfusion  Goal: Ability to achieve and maintain adequate renal perfusion and functioning will improve  Outcome: Met     Problem: Respiratory  Goal: Patient will achieve/maintain optimum respiratory ventilation and gas exchange  Outcome: Met     Problem: Mechanical Ventilation  Goal: Safe management of artificial airway and ventilation  Outcome: Met  Goal: Successful weaning off mechanical ventilator, spontaneously maintains adequate gas exchange  Outcome: Met  Goal: Patient will be able to express needs and understand communication  Outcome: Met     Problem: Physical Regulation  Goal: Diagnostic test results will improve  Outcome: Met  Goal: Signs and symptoms of infection will decrease  Outcome: Met         "

## 2024-03-08 NOTE — H&P
H&P reviewed. No change in the last 24 hours. Discussed about risks and benefits of extraction of remaining mandibular teeth and I&D of submental/submandibular space abscesses, including but not limited to bleeding, pain, swelling, persistent infection, damage to adjacent structures (blood vessels, nerves), temporary or permanent numbness around surgical site, facial nerve weakness or paralysis. Questions were encouraged and answered. Patient endorsed understanding and agreed to proceed as planned.  __________  Derek Sanchez D.M.D.   [phone]

## 2024-03-08 NOTE — ANESTHESIA PROCEDURE NOTES
Airway    Date/Time: 3/7/2024 6:04 PM    Performed by: Stef Le M.D.  Authorized by: Stef Le M.D.    Location:  OR  Urgency:  Elective  Difficult Airway: No    Indications for Airway Management:  Anesthesia      Spontaneous Ventilation: absent    Sedation Level:  Deep  Preoxygenated: Yes    Patient Position:  Sniffing  Mask Difficulty Assessment:  0 - not attempted  Final Airway Type:  Endotracheal airway  Final Endotracheal Airway:  ETT  Cuffed: Yes    Technique Used for Successful ETT Placement:  Direct laryngoscopy  Devices/Methods Used in Placement:  Intubating stylet    Insertion Site:  Oral  Blade Type:  [place]  Laryngoscope Blade/Videolaryngoscope Blade Size:  2  ETT Size (mm):  6.5  Measured from:  Teeth  ETT to Teeth (cm):  22  Placement Verified by: auscultation and capnometry    Cormack-Lehane Classification:  Grade I - full view of glottis  Number of Attempts at Approach:  1

## 2024-03-08 NOTE — PROGRESS NOTES
AxOx4. On 2L of oxygen.  Denies SOB/chest pain/numbness and tingling.  Verbalizes 4/10 pain. Pain medications in use per MAR.  Skin per flow sheets. 2 penrose drains under chin R side.  +void. LBM 3/7 per report.  Denies N/V. Tolerating clears diet.  Pt ambulates with standby assist.  SCDs on to BLE.

## 2024-03-08 NOTE — OP REPORT
Operative Note  [facility] Oral & Maxillofacial Surgery       Date: 03/07/24  Name: Alesia Cesar  MRN: [id number]    Surgeon: Derek Sanchez DMD    Anesthesia: GA    Preoperative Dx: Carious/non-restorable remaining mandibular teeth (#23, 24, 25, 26, 27)  Submandibualr space abscess    Postoperative Dx: Same    Procedures:  1) Extraction of teeth #23, 24, 25, 26, 27  2) I&D of submental space abscess with cultures and placement of 3/8in penrose drains x 2    Intraoperative Findings:  Injected 6cc of 2% Lidocaine w/ 1:100k epi to surgical sites  Scant purulence encountered from skin perforation in submental region  Cultures taken    Indications:   68 y.o. female with carious/non-restorable mandibular teeth and submental space phlegmon/abscess as confirmed by clinical and radiographic examination. Reviewed details of the procedure, benefits and risks including but not limited to: pain, bleeding, swelling, infection, scarring, risk of injury to nerves of the face (movement and feeling), need for revision or re-operation due to cosmetic or functional deficit, changes in speech, changes in swallowing, and need for additional surgeries. All questions encouraged and answered and consent signed.    Description of procedure:  The patient was transported to the operating room and identified. Induction of anesthesia and intubation completed without complication. Patient was positioned and padded. A timeout was performed and appropriate medications given.    The patient was prepped and draped in a sterile fashion.     Injected 6cc of 2% Lidocaine w/ 1:100k epi to submental region and intraoral surgical sites. Cleaned submental region using ray-tecs, multiple skin perforations noted. Cultures taken from largest skin perforation extending from submental region to sublingual spaces. Directed attention to teeth #23-27 - teeth extracted using luxation followed by delivery with forceps. Curettage of sockets to remove  granulation tissue. Copious irrigation with sterile saline, closure with 4-0 CGS running suture.    Accessed submental region from skin perforation, blunt dissection with tonsil hemostats, scant purulence encountered with no identifiable abscess. 3/8in penrose drains placed through two skin perforation sites into wound defect and sutured to skin with 3-0 CGS interrupted x 2 each. Closure of skin perforations with 3-0 CGS interrupted. Gauze fluffs and neuro net placed for dressing.     At this point the procedure was deemed complete and the patient was turned over to the anesthesia team. She was extubated in OR without complications and transferred to the PACU for recovery.     Estimated blood loss: see anesthesia record    Fluids/Products: see anesthesia record    UOP: see anesthesia record    Dressings: Gauze fluffs and neuro net    Drains: 3/8in penrose drains x 2    Complications: none    Derek Sanchez, DAVID  [facility] Oral & Maxillofacial Surgery

## 2024-03-08 NOTE — ANESTHESIA POSTPROCEDURE EVALUATION
Patient: Alesia Cesar    Procedure Summary       Date: 03/07/24 Room / Location: [place] [address] / SURGERY [facility]    Anesthesia Start: 1758 Anesthesia Stop: 1900    Procedures:       EXTRACTION, TEETH (Mouth)      INCISION AND DRAINAGE SUB MANDIBULAR (Mouth) Diagnosis: (fractured/non-restorable remaining mandibular dentition, large submental/submandibular space phlegmon)    Surgeons: Derek Sanchez D.M.D. Responsible Provider: Stef Le M.D.    Anesthesia Type: general ASA Status: 3 - Emergent            Final Anesthesia Type: general  Last vitals  BP   Blood Pressure : 118/74    Temp   36.3 °C (97.3 °F)    Pulse   92   Resp   17    SpO2   93 %      Anesthesia Post Evaluation    Patient location during evaluation: PACU  Patient participation: complete - patient participated  Level of consciousness: awake  Pain score: 2    Airway patency: patent  Anesthetic complications: no  Cardiovascular status: adequate  Respiratory status: acceptable and face mask  Hydration status: stable    PONV: controlled          No notable events documented.     Nurse Pain Score: 0 (NPRS)

## 2024-03-08 NOTE — ANESTHESIA PREPROCEDURE EVALUATION
Case: [id number] Date/Time: 03/07/24 1837    Procedures:       EXTRACTION, TOOTH      INCISION AND DRAINAGE SUB MANDIBULAR    Location: [facility] OR 12 / SURGERY [facility]    Surgeons: Derek Sanchez D.M.D.            Relevant Problems   ENDO   (positive) Type 2 diabetes mellitus without complication, without long-term current use of insulin (HCC)      Other   (positive) Abscess of chin   (positive) Dental decay   (positive) Hypokalemia   (positive) Metabolic acidosis   (positive) Tobacco abuse       Physical Exam    Airway   Mallampati: III  TM distance: >3 FB  Neck ROM: limited       Cardiovascular   Rhythm: regular  Rate: normal  (+) peripheral edema     Dental - normal exam  (+) upper dentures      Very poor dentition     Pulmonary   (+) decreased breath sounds     Abdominal   (+) obese     Neurological - normal exam                   Anesthesia Plan    ASA 3- EMERGENT   ASA physical status 3 criteria: diabetes - poorly controlledASA physical status emergent criteria: acutely contaminated wound or identified infection source    Plan - general       Airway plan will be ETT          Induction: intravenous    Postoperative Plan: Postoperative administration of opioids is intended.    Pertinent diagnostic labs and testing reviewed    Informed Consent:    Anesthetic plan and risks discussed with patient.    Use of blood products discussed with: patient whom consented to blood products.

## 2024-03-08 NOTE — DISCHARGE INSTRUCTIONS
Special Equipment    You are being discharged with the following special equipment:  One penrose drain in chin. Dr. Sanchez recommends biting on gauze for oozing.     Diet    Soft Food Diet    A Soft Food Diet includes foods that are safe and easy to swallow. Generally, the food should be soft enough to be mashed with a fork. Take small bites of food, or cut food into pieces about ½ inch or smaller. Avoid foods that are dry, hard to chew, crunchy, sticky, stringy, or crispy.      Diabetic Diet     A Diabetic Diet can help you control your blood glucose, lower your risk of heart disease, improve your blood pressure and maintain a healthy weight.  Eating healthy foods, low in calories, fat and carbohydrates at the same time every day can help control your blood glucose. Carbohydrates can greatly affect your blood glucose levels.  Counting carbs is important to keep your blood glucose at a healthy level, especially if you use insulin or take certain oral diabetes medicines.  Avoid alcohol as it can cause a sudden decrease in blood glucose (hypoglycemia), especially if you use insulin or take certain oral diabetes medicines.\    Activity    Resume Your Normal Activity    You may resume your normal activity as tolerated.  Rest as needed.      Wound Care: Change dressing on chin as needed if soaked. You are being sent home with dry gauze, dry roll gauze, and hypafix tape.     Call: [facility] - [phone] - on 3/11/2024 to schedule OP wound care appointment.

## 2024-03-08 NOTE — DISCHARGE PLANNING
Case Management Discharge Planning    Admission Date: 3/6/2024  GMLOS: 3.6  ALOS: 2    6-Clicks ADL Score: 24  6-Clicks Mobility Score: 24      Anticipated Discharge Dispo: Discharge Disposition: Discharged to home/self care (01)    LMSW called [facility] at [phone] regarding setting up pt for OP wound apt. No answer, LMSW left voicemail with pts information and call back number.     LMSW also left voicemail for [facility] at [phone] regarding an apt for this pt. Also left voicemail and call back number.    1518 Update  LMSW spoke with pt at bedside regarding discharge without a OP Wound follow up.   Pt understanding that this LMSW has not gotten a call back   LMSW left another VM for [facility] as pt prefers [facility].   Pt understanding to call for OP Wound apts, sister also at bedside made aware.

## 2024-03-08 NOTE — PROGRESS NOTES
Pt ambulated to bathroom with drg and 2 penrose drains in place.  When pt back from bathroom, RN noted 1 penrose drain was out completely and sitting in the drg. Reinforced that site with gauze and hypafix tape and reinforced second penrose drain site with occlusive drg to keep penrose in place.    2155 - notified oral surgeon via voalte.

## 2024-03-08 NOTE — PROGRESS NOTES
4 Eyes Skin Assessment Completed by AGUILA Crawford and AGUILA Lucia.    Head WDL  Ears WDL  Nose WDL  Mouth teeth extracted; drie4d blood with gauze  Neck/Chin Swelling, Redness, Abscess with drainage and 2 penrose drains with drg in place  Breast/Chest moist under breasts - interdry placed and Scabs  Shoulder Blades WDL  Spine WDL  (R) Arm/Elbow/Hand WDL PIV  (L) Arm/Elbow/Hand WDL PIV  Abdomen Redness in pannus area - interdry  Groin Redness and Blanching  Scrotum/Coccyx/Buttocks redness blanching intact  (R) Leg scab to lower leg/shin otherwise intact  (L) Leg scabs to lower leg  (R) Heel/Foot/Toe Redness and Blanching  (L) Heel/Foot/Toe Redness and Blanching          Devices In Places Blood Pressure Cuff, Pulse Ox, and Nasal Cannula        Interventions In Place NC W/Ear Foams, Pillows, dri amadou pads and Low Air Loss Mattress. Barrier Cream and Wipes    Possible Skin Injury Yes

## 2024-03-08 NOTE — CONSULTS
Post Op note     Dx: Right Submandibular Abscess - secondary to odontogenic origin     Procedure:     Incision and drainage of submental space  Extraction of teeth #23, 24, 25, 26, 27    Plan:     1. Continue IV abx - discharge on PO ABX for 1 week   2. Okay for soft diet   3. Two penrose drains placed with resorbable suture - pt to keep for one week.  4. Bite on gauze PRN for oral oozing   5. Recommend Norco PRN pain     Procedure went well - likely no further surgical intervention needed. Pt will likely be okay for discharge in 1-2 days.      Follow up as needed for drain removal at [facility] in one week.     Thank you,  Derek Sanchez, DMD  [phone]

## 2024-03-09 LAB
BACTERIA UR CULT: NORMAL
BACTERIA WND AEROBE CULT: ABNORMAL
BACTERIA WND AEROBE CULT: ABNORMAL
GRAM STN SPEC: ABNORMAL
SIGNIFICANT IND 70042: ABNORMAL
SIGNIFICANT IND 70042: NORMAL
SITE SITE: ABNORMAL
SITE SITE: NORMAL
SOURCE SOURCE: ABNORMAL
SOURCE SOURCE: NORMAL

## 2024-03-09 NOTE — PROGRESS NOTES
D/c instructions given, educated on worsening s/s. Pt understands and questions answered. D/c home with sister.

## 2024-03-10 LAB
BACTERIA SPEC ANAEROBE CULT: NORMAL
SIGNIFICANT IND 70042: NORMAL
SITE SITE: NORMAL
SOURCE SOURCE: NORMAL

## 2024-03-10 NOTE — DOCUMENTATION QUERY
Formerly [facility], [facility]–1983                                                                       Query Response Note      PATIENT:               ANILA DINERO  ACCT #:                  [id number]  MRN:                     [id number]  :                      1955  ADMIT DATE:       3/6/2024 4:39 PM  DISCH DATE:        3/8/2024 4:14 PM  RESPONDING  PROVIDER #:        [id number]           QUERY TEXT:    Sepsis is noted in documentation.    Please clarify the status of sepsis by providing sepsis-related organ dysfunction.      Sepsis - real or suspected infection plus 2 or more SIRS criteria + organ dysfunction related to sepsis    Temp <96.8 or >101  HR >90  RR >20  WBC <4,000 or > 12,000 or >10% bandemia     Examples of organ dysfunction:  - acute renal failure creatinine > 2  - acute respiratory failure  - Total bilirubin >2  - Platelet count <100,000  - INR >1.5 or aPTT >60sec  - encephalopathy/altered mental status   - Lactate >2  - SBP <90 MAP <65 or SBP decrease of more than 40mmhg  - septic shock        The patient's Clinical Indicators include:  Findings: Epic 3/6 /-36-99% WBC 25.9  Cre 0.71 Lactic acid 1.4     3/6 HP:  very large submandibular abscess that is quite purulent  surrounding erythema and swelling with necrotic tissue  Metabolic acidosis Bicarb is low at 15 and anion gap is high at 18  Secondary to sepsis and dehydration    Treatment: IV fluids, pending procedure, antibiotics     Risk Factors: needs tooth extraction, dental carries,  very large abscess that is quite purulent    Veronica Rai RN BSN  Clinical Documentation   [email or web address]  Connect via Voalte Messenger  Options provided:   -- Sepsis exists, (provide sepsis-related organ dysfunction)   -- Sepsis does not exist - amended documentation in the medical record and updated problem list   -- Other explanation, Please specify      Query created by:  Veronica Jay on 3/7/2024 5:52 AM    RESPONSE TEXT:    Sepsis does not exist - amended documentation in the medical record and updated problem list          Electronically signed by:  SENTHIL PACHECO MD 3/10/2024 2:33 PM

## 2024-03-11 LAB
BACTERIA BLD CULT: NORMAL
BACTERIA BLD CULT: NORMAL
SIGNIFICANT IND 70042: NORMAL
SIGNIFICANT IND 70042: NORMAL
SITE SITE: NORMAL
SITE SITE: NORMAL
SOURCE SOURCE: NORMAL
SOURCE SOURCE: NORMAL

## 2024-03-13 ENCOUNTER — OUTPATIENT (OUTPATIENT)
Dept: OUTPATIENT SERVICES | Facility: HOSPITAL | Age: 69
LOS: 1 days | End: 2024-03-13
Payer: MEDICARE

## 2024-03-13 ENCOUNTER — APPOINTMENT (OUTPATIENT)
Dept: MRI IMAGING | Facility: CLINIC | Age: 69
End: 2024-03-13
Payer: MEDICARE

## 2024-03-13 DIAGNOSIS — Z90.49 ACQUIRED ABSENCE OF OTHER SPECIFIED PARTS OF DIGESTIVE TRACT: Chronic | ICD-10-CM

## 2024-03-13 DIAGNOSIS — K83.8 OTHER SPECIFIED DISEASES OF BILIARY TRACT: ICD-10-CM

## 2024-03-13 DIAGNOSIS — Z98.890 OTHER SPECIFIED POSTPROCEDURAL STATES: Chronic | ICD-10-CM

## 2024-03-13 DIAGNOSIS — Z92.241 PERSONAL HISTORY OF SYSTEMIC STEROID THERAPY: Chronic | ICD-10-CM

## 2024-03-13 DIAGNOSIS — Z00.8 ENCOUNTER FOR OTHER GENERAL EXAMINATION: ICD-10-CM

## 2024-03-13 PROCEDURE — 74183 MRI ABD W/O CNTR FLWD CNTR: CPT | Mod: 26

## 2024-03-13 PROCEDURE — 74183 MRI ABD W/O CNTR FLWD CNTR: CPT

## 2024-03-13 PROCEDURE — A9585: CPT

## 2024-04-18 ENCOUNTER — NON-APPOINTMENT (OUTPATIENT)
Age: 69
End: 2024-04-18

## 2024-05-06 ENCOUNTER — APPOINTMENT (OUTPATIENT)
Dept: UROLOGY | Facility: CLINIC | Age: 69
End: 2024-05-06
Payer: MEDICARE

## 2024-05-06 VITALS
HEART RATE: 76 BPM | DIASTOLIC BLOOD PRESSURE: 88 MMHG | WEIGHT: 140 LBS | HEIGHT: 63 IN | SYSTOLIC BLOOD PRESSURE: 134 MMHG | BODY MASS INDEX: 24.8 KG/M2

## 2024-05-06 DIAGNOSIS — N28.9 DISORDER OF KIDNEY AND URETER, UNSPECIFIED: ICD-10-CM

## 2024-05-06 DIAGNOSIS — N28.1 CYST OF KIDNEY, ACQUIRED: ICD-10-CM

## 2024-05-06 PROCEDURE — 99203 OFFICE O/P NEW LOW 30 MIN: CPT

## 2024-05-06 RX ORDER — ZOLPIDEM TARTRATE 5 MG/1
TABLET, FILM COATED ORAL
Refills: 0 | Status: ACTIVE | COMMUNITY

## 2024-05-06 NOTE — HISTORY OF PRESENT ILLNESS
[FreeTextEntry1] : 69 year old woman seen 05/06/2024 with complaint of renal lesion. She had palpable mass in upper abdomen, was sent for imaging which showed dilated CBD. MRCP showed 5 mm indeterminate lesion in LEFT lower pole of kidney. Upper endoscopy showed CBD dilation benigh, gastric ulcer benign on biopsy. No hematuria, no dysuria, no frequency, no urgency, no hesitancy, no straining. No incontinence.  No fevers, no chills, no nausea, no vomiting, no flank pain.  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

## 2024-05-06 NOTE — ASSESSMENT
[FreeTextEntry1] : With regards to the renal cysts, pt was told that renal cysts should be evaluated with cross sectional imaging with IV contrast. On CT with contrast, renal cysts are evaluated with the Bosniak classification. Bosniak 1 indicates a simple renal cyst with no contrast enhancement, no solid components. Bosniak 2 cysts have few hairline septations, few calcifications, but no enhancement. Bosniak 2F have more septations, minimal wall thickening, but no enhancement.  Bosniak 3 cyst have cystic and solid enhancing components. Bosniak 4 lesions are solid enhancing masses. Bosniak 1-2 cysts are benign and do not require follow up. Bosniak 2F lesions require follow up imaging. Bosniak 3-4 lesions are concerning for malignancy are require either resection or active surveillance depending on size as well as patient preferences and parameters.  Will plan for MRI in 6 months. Reassured pt lesion is likely benign, but even if has concerning features surveillance would be recommended for this size.

## 2024-05-09 ENCOUNTER — NON-APPOINTMENT (OUTPATIENT)
Age: 69
End: 2024-05-09

## 2024-05-17 DIAGNOSIS — K83.8 OTHER SPECIFIED DISEASES OF BILIARY TRACT: ICD-10-CM

## 2024-06-04 ENCOUNTER — RX RENEWAL (OUTPATIENT)
Age: 69
End: 2024-06-04

## 2024-06-04 RX ORDER — OMEPRAZOLE 40 MG/1
40 CAPSULE, DELAYED RELEASE ORAL
Qty: 90 | Refills: 0 | Status: ACTIVE | COMMUNITY
Start: 2024-02-27 | End: 1900-01-01

## 2024-06-10 ENCOUNTER — APPOINTMENT (OUTPATIENT)
Dept: MRI IMAGING | Facility: CLINIC | Age: 69
End: 2024-06-10
Payer: MEDICARE

## 2024-06-10 ENCOUNTER — OUTPATIENT (OUTPATIENT)
Dept: OUTPATIENT SERVICES | Facility: HOSPITAL | Age: 69
LOS: 1 days | End: 2024-06-10
Payer: MEDICARE

## 2024-06-10 DIAGNOSIS — Z00.8 ENCOUNTER FOR OTHER GENERAL EXAMINATION: ICD-10-CM

## 2024-06-10 DIAGNOSIS — Z98.890 OTHER SPECIFIED POSTPROCEDURAL STATES: Chronic | ICD-10-CM

## 2024-06-10 DIAGNOSIS — Z92.241 PERSONAL HISTORY OF SYSTEMIC STEROID THERAPY: Chronic | ICD-10-CM

## 2024-06-10 DIAGNOSIS — Z90.49 ACQUIRED ABSENCE OF OTHER SPECIFIED PARTS OF DIGESTIVE TRACT: Chronic | ICD-10-CM

## 2024-06-10 DIAGNOSIS — K83.8 OTHER SPECIFIED DISEASES OF BILIARY TRACT: ICD-10-CM

## 2024-06-10 PROCEDURE — 74183 MRI ABD W/O CNTR FLWD CNTR: CPT

## 2024-06-10 PROCEDURE — 74183 MRI ABD W/O CNTR FLWD CNTR: CPT | Mod: 26

## 2024-06-10 PROCEDURE — A9585: CPT

## 2024-06-15 NOTE — PROCEDURE: COUNSELING
Pt discharged home with belongings, medications, and AVS.  for transportation. Discontinuation of port per chart orders.   Detail Level: Detailed

## 2024-06-17 ENCOUNTER — NON-APPOINTMENT (OUTPATIENT)
Age: 69
End: 2024-06-17

## 2024-07-16 ENCOUNTER — NON-APPOINTMENT (OUTPATIENT)
Age: 69
End: 2024-07-16

## 2024-07-17 ENCOUNTER — APPOINTMENT (OUTPATIENT)
Dept: OTOLARYNGOLOGY | Facility: CLINIC | Age: 69
End: 2024-07-17
Payer: MEDICARE

## 2024-07-17 VITALS — HEIGHT: 63 IN | WEIGHT: 140 LBS | BODY MASS INDEX: 24.8 KG/M2

## 2024-07-17 DIAGNOSIS — H90.3 SENSORINEURAL HEARING LOSS, BILATERAL: ICD-10-CM

## 2024-07-17 DIAGNOSIS — R22.1 LOCALIZED SWELLING, MASS AND LUMP, NECK: ICD-10-CM

## 2024-07-17 DIAGNOSIS — H93.13 TINNITUS, BILATERAL: ICD-10-CM

## 2024-07-17 DIAGNOSIS — H93.291 OTHER ABNORMAL AUDITORY PERCEPTIONS, RIGHT EAR: ICD-10-CM

## 2024-07-17 DIAGNOSIS — H68.022 CHRONIC EUSTACHIAN SALPINGITIS, LEFT EAR: ICD-10-CM

## 2024-07-17 PROCEDURE — 99213 OFFICE O/P EST LOW 20 MIN: CPT

## 2024-07-17 PROCEDURE — 92567 TYMPANOMETRY: CPT

## 2024-07-17 PROCEDURE — 92557 COMPREHENSIVE HEARING TEST: CPT

## 2024-07-29 DIAGNOSIS — N28.9 DISORDER OF KIDNEY AND URETER, UNSPECIFIED: ICD-10-CM

## 2024-09-09 ENCOUNTER — APPOINTMENT (OUTPATIENT)
Dept: GASTROENTEROLOGY | Facility: CLINIC | Age: 69
End: 2024-09-09

## 2024-10-28 ENCOUNTER — APPOINTMENT (OUTPATIENT)
Dept: MRI IMAGING | Facility: CLINIC | Age: 69
End: 2024-10-28
Payer: MEDICARE

## 2024-10-28 ENCOUNTER — OUTPATIENT (OUTPATIENT)
Dept: OUTPATIENT SERVICES | Facility: HOSPITAL | Age: 69
LOS: 1 days | End: 2024-10-28
Payer: MEDICARE

## 2024-10-28 DIAGNOSIS — Z98.890 OTHER SPECIFIED POSTPROCEDURAL STATES: Chronic | ICD-10-CM

## 2024-10-28 DIAGNOSIS — Z90.49 ACQUIRED ABSENCE OF OTHER SPECIFIED PARTS OF DIGESTIVE TRACT: Chronic | ICD-10-CM

## 2024-10-28 DIAGNOSIS — Z92.241 PERSONAL HISTORY OF SYSTEMIC STEROID THERAPY: Chronic | ICD-10-CM

## 2024-10-28 DIAGNOSIS — N28.9 DISORDER OF KIDNEY AND URETER, UNSPECIFIED: ICD-10-CM

## 2024-10-28 PROCEDURE — 74183 MRI ABD W/O CNTR FLWD CNTR: CPT | Mod: 26

## 2024-10-28 PROCEDURE — A9585: CPT

## 2024-10-28 PROCEDURE — 74183 MRI ABD W/O CNTR FLWD CNTR: CPT

## 2024-11-07 ENCOUNTER — APPOINTMENT (OUTPATIENT)
Dept: UROLOGY | Facility: CLINIC | Age: 69
End: 2024-11-07
Payer: MEDICARE

## 2024-11-07 VITALS
HEIGHT: 63 IN | RESPIRATION RATE: 16 BRPM | HEART RATE: 69 BPM | SYSTOLIC BLOOD PRESSURE: 114 MMHG | OXYGEN SATURATION: 100 % | WEIGHT: 150 LBS | DIASTOLIC BLOOD PRESSURE: 81 MMHG | BODY MASS INDEX: 26.58 KG/M2

## 2024-11-07 DIAGNOSIS — N28.9 DISORDER OF KIDNEY AND URETER, UNSPECIFIED: ICD-10-CM

## 2024-11-07 PROCEDURE — 99213 OFFICE O/P EST LOW 20 MIN: CPT

## 2025-03-14 NOTE — CHART NOTE - NSCHARTNOTEFT_GEN_A_CORE
Patient has a diagnosis of COVID-19 infection complicated by pneumonia and hypoxia and will require home oxygen therapy at      3 L/min to facilitate hospital discharge. Patient has a diagnosis of COVID-19 infection complicated by pneumonia and hypoxia and will require home oxygen therapy at  4 L/min to facilitate hospital discharge. [Time Spent: ___ minutes] : I have spent [unfilled] minutes of time on the encounter which excludes teaching and separately reported services.

## 2025-05-07 ENCOUNTER — APPOINTMENT (OUTPATIENT)
Dept: ULTRASOUND IMAGING | Facility: CLINIC | Age: 70
End: 2025-05-07
Payer: MEDICARE

## 2025-05-07 ENCOUNTER — OUTPATIENT (OUTPATIENT)
Dept: OUTPATIENT SERVICES | Facility: HOSPITAL | Age: 70
LOS: 1 days | End: 2025-05-07
Payer: MEDICARE

## 2025-05-07 DIAGNOSIS — Z98.890 OTHER SPECIFIED POSTPROCEDURAL STATES: Chronic | ICD-10-CM

## 2025-05-07 DIAGNOSIS — Z90.49 ACQUIRED ABSENCE OF OTHER SPECIFIED PARTS OF DIGESTIVE TRACT: Chronic | ICD-10-CM

## 2025-05-07 DIAGNOSIS — Z92.241 PERSONAL HISTORY OF SYSTEMIC STEROID THERAPY: Chronic | ICD-10-CM

## 2025-05-07 DIAGNOSIS — N28.9 DISORDER OF KIDNEY AND URETER, UNSPECIFIED: ICD-10-CM

## 2025-05-07 PROCEDURE — 76775 US EXAM ABDO BACK WALL LIM: CPT | Mod: 26

## 2025-05-07 PROCEDURE — 76775 US EXAM ABDO BACK WALL LIM: CPT

## 2025-05-13 ENCOUNTER — NON-APPOINTMENT (OUTPATIENT)
Age: 70
End: 2025-05-13

## 2025-05-18 ENCOUNTER — NON-APPOINTMENT (OUTPATIENT)
Age: 70
End: 2025-05-18

## 2025-06-02 ENCOUNTER — APPOINTMENT (OUTPATIENT)
Dept: UROLOGY | Facility: CLINIC | Age: 70
End: 2025-06-02
Payer: MEDICARE

## 2025-06-02 VITALS
WEIGHT: 150 LBS | SYSTOLIC BLOOD PRESSURE: 109 MMHG | BODY MASS INDEX: 26.58 KG/M2 | OXYGEN SATURATION: 100 % | DIASTOLIC BLOOD PRESSURE: 71 MMHG | HEIGHT: 63 IN | HEART RATE: 67 BPM | RESPIRATION RATE: 16 BRPM

## 2025-06-02 DIAGNOSIS — R10.30 LOWER ABDOMINAL PAIN, UNSPECIFIED: ICD-10-CM

## 2025-06-02 DIAGNOSIS — N28.1 CYST OF KIDNEY, ACQUIRED: ICD-10-CM

## 2025-06-02 DIAGNOSIS — N28.9 DISORDER OF KIDNEY AND URETER, UNSPECIFIED: ICD-10-CM

## 2025-06-02 PROCEDURE — 99213 OFFICE O/P EST LOW 20 MIN: CPT

## 2025-07-08 ENCOUNTER — APPOINTMENT (OUTPATIENT)
Dept: LAB | Facility: MEDICAL CENTER | Age: 70
End: 2025-07-08
Payer: COMMERCIAL

## 2025-07-24 ENCOUNTER — APPOINTMENT (OUTPATIENT)
Dept: OTOLARYNGOLOGY | Facility: CLINIC | Age: 70
End: 2025-07-24
Payer: MEDICARE

## 2025-07-24 VITALS — BODY MASS INDEX: 26.58 KG/M2 | HEIGHT: 63 IN | WEIGHT: 150 LBS

## 2025-07-24 DIAGNOSIS — H68.022 CHRONIC EUSTACHIAN SALPINGITIS, LEFT EAR: ICD-10-CM

## 2025-07-24 DIAGNOSIS — H93.13 TINNITUS, BILATERAL: ICD-10-CM

## 2025-07-24 DIAGNOSIS — H90.3 SENSORINEURAL HEARING LOSS, BILATERAL: ICD-10-CM

## 2025-07-24 PROCEDURE — 99213 OFFICE O/P EST LOW 20 MIN: CPT

## 2025-07-24 PROCEDURE — 92557 COMPREHENSIVE HEARING TEST: CPT

## 2025-07-24 PROCEDURE — 92567 TYMPANOMETRY: CPT

## 2025-07-24 RX ORDER — SEMAGLUTIDE 1.34 MG/ML
2 INJECTION, SOLUTION SUBCUTANEOUS
Refills: 0 | Status: ACTIVE | COMMUNITY

## (undated) DEVICE — TOWEL STOP TIMEOUT SAFETY FLAG (40EA/CA)

## (undated) DEVICE — TUBING CLEARLINK DUO-VENT - C-FLO (48EA/CA)

## (undated) DEVICE — KIT  I.V. START (100EA/CA)

## (undated) DEVICE — ELECTRODE DUAL RETURN W/ CORD - (50/PK)

## (undated) DEVICE — WATER IRRIGATION STERILE 1000ML (12EA/CA)

## (undated) DEVICE — CANISTER SUCTION RIGID RED 1500CC (40EA/CA)

## (undated) DEVICE — SUCTION INSTRUMENT YANKAUER BULBOUS TIP W/O VENT (50EA/CA)

## (undated) DEVICE — SODIUM CHL IRRIGATION 0.9% 1000ML (12EA/CA)

## (undated) DEVICE — GLOVE BIOGEL PI INDICATOR SZ 8.0 SURGICAL PF LF -(50/BX 4BX/CA)

## (undated) DEVICE — SENSOR OXIMETER ADULT SPO2 RD SET (20EA/BX)

## (undated) DEVICE — CANISTER SUCTION 3000ML MECHANICAL FILTER AUTO SHUTOFF MEDI-VAC NONSTERILE LF DISP  (40EA/CA)

## (undated) DEVICE — SUTURE GENERAL

## (undated) DEVICE — GLOVE BIOGEL PI ORTHO SZ 8 PF LF (40PR/BX)

## (undated) DEVICE — SET CONTINU-FLO SOLN 3 - (48/CA)

## (undated) DEVICE — GOWN SURGEONS LARGE - (32/CA)

## (undated) DEVICE — LACTATED RINGERS INJ 1000 ML - (14EA/CA 60CA/PF)

## (undated) DEVICE — COVER TABLE 44 X 90 - (22/CA)

## (undated) DEVICE — GOWN SURGEONS X-LARGE - DISP. (30/CA)

## (undated) DEVICE — GLOVE LITE HANDLE DISP. - (1/PK 80PK/CS)

## (undated) DEVICE — SUTURE 4-0 CHROMIC GUTSH 27 (36PK/BX)"

## (undated) DEVICE — DRAPE MAYO STAND - (30/CA)

## (undated) DEVICE — CATHETER IV SAFETY 20 GA X 1-1/4 (50/BX)

## (undated) DEVICE — MICRODRIP PRIMARY VENTED 60 (48EA/CA) THIS WAS PART #2C8428 WHICH WAS DISCONTINUED

## (undated) DEVICE — SET EXTENSION WITH 2 PORTS (48EA/CA) ***PART #2C8610 IS A SUBSTITUTE*****

## (undated) DEVICE — CHLORAPREP 26 ML APPLICATOR - ORANGE TINT(25/CA)

## (undated) DEVICE — MASK ANESTHESIA CHILD INFLATABLE CUSHION BUBBLEGUM (50EA/CS)

## (undated) DEVICE — GAUZE FLUFF STERILE 2-PLY 36 X 36 (100EA/CA)

## (undated) DEVICE — DRAPE LARGE 3 QUARTER - (20/CA)

## (undated) DEVICE — SENSOR SKIN TEMPERATURE - (30EA/BX 3BX/CS)

## (undated) DEVICE — SPONGE XRAY 8X4 STERL. 12PL - (10EA/TY 80TY/CA)

## (undated) DEVICE — CIRCUIT VENTILATOR PEDIATRIC WITH FILTER  (20EA/CS)

## (undated) DEVICE — SET LEADWIRE 5 LEAD BEDSIDE DISPOSABLE ECG (1SET OF 5/EA)

## (undated) DEVICE — SLEEVE VASO CALF MED - (10PR/CA)

## (undated) DEVICE — COVER LIGHT HANDLE ALC PLUS DISP (18EA/BX)

## (undated) DEVICE — LACTATED RINGERS INJ. 500 ML - (24EA/CA)

## (undated) DEVICE — SUTURE 3-0 CHROMIC GUT SH 27 (36PK/BX)"

## (undated) DEVICE — TOWELS CLOTH SURGICAL - (4/PK 20PK/CA)

## (undated) DEVICE — DRAIN PENROSE 3/8 IN X 12 IN - STERILE (25EA/BX)

## (undated) DEVICE — GOWN WARMING STANDARD FLEX - (30/CA)

## (undated) DEVICE — SPONGE GAUZESTER 4 X 4 4PLY - (128PK/CA)

## (undated) DEVICE — PACK MINOR BASIN - (2EA/CA)

## (undated) DEVICE — SWAB CULTURE AMIES ESWAB (50EA/PK)